# Patient Record
Sex: MALE | Race: WHITE | NOT HISPANIC OR LATINO | URBAN - METROPOLITAN AREA
[De-identification: names, ages, dates, MRNs, and addresses within clinical notes are randomized per-mention and may not be internally consistent; named-entity substitution may affect disease eponyms.]

---

## 2017-12-18 ENCOUNTER — EMERGENCY (EMERGENCY)
Facility: HOSPITAL | Age: 67
LOS: 0 days | Discharge: HOME | End: 2017-12-18

## 2017-12-18 DIAGNOSIS — E11.9 TYPE 2 DIABETES MELLITUS WITHOUT COMPLICATIONS: ICD-10-CM

## 2017-12-18 DIAGNOSIS — E78.00 PURE HYPERCHOLESTEROLEMIA, UNSPECIFIED: ICD-10-CM
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
## 2017-12-18 DIAGNOSIS — S09.90XA UNSPECIFIED INJURY OF HEAD, INITIAL ENCOUNTER: ICD-10-CM
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
## 2017-12-18 DIAGNOSIS — I10 ESSENTIAL (PRIMARY) HYPERTENSION: ICD-10-CM

## 2017-12-18 DIAGNOSIS — S00.01XA ABRASION OF SCALP, INITIAL ENCOUNTER: ICD-10-CM

## 2017-12-18 DIAGNOSIS — Z88.2 ALLERGY STATUS TO SULFONAMIDES: ICD-10-CM
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
## 2017-12-18 DIAGNOSIS — Z79.02 LONG TERM (CURRENT) USE OF ANTITHROMBOTICS/ANTIPLATELETS: ICD-10-CM

## 2018-09-12 ENCOUNTER — OUTPATIENT (OUTPATIENT)
Dept: OUTPATIENT SERVICES | Facility: HOSPITAL | Age: 68
LOS: 1 days | Discharge: HOME | End: 2018-09-12

## 2018-09-12 DIAGNOSIS — E11.65 TYPE 2 DIABETES MELLITUS WITH HYPERGLYCEMIA: ICD-10-CM

## 2018-09-12 DIAGNOSIS — E03.9 HYPOTHYROIDISM, UNSPECIFIED: ICD-10-CM

## 2018-09-12 DIAGNOSIS — E78.5 HYPERLIPIDEMIA, UNSPECIFIED: ICD-10-CM

## 2021-07-21 ENCOUNTER — LABORATORY RESULT (OUTPATIENT)
Age: 71
End: 2021-07-21

## 2021-07-22 ENCOUNTER — APPOINTMENT (OUTPATIENT)
Dept: UROLOGY | Facility: CLINIC | Age: 71
End: 2021-07-22
Payer: MEDICARE

## 2021-07-22 VITALS — HEIGHT: 72 IN | BODY MASS INDEX: 30.88 KG/M2 | WEIGHT: 228 LBS

## 2021-07-22 DIAGNOSIS — Z80.3 FAMILY HISTORY OF MALIGNANT NEOPLASM OF BREAST: ICD-10-CM

## 2021-07-22 DIAGNOSIS — N40.0 BENIGN PROSTATIC HYPERPLASIA WITHOUT LOWER URINARY TRACT SYMPMS: ICD-10-CM

## 2021-07-22 DIAGNOSIS — R35.0 FREQUENCY OF MICTURITION: ICD-10-CM

## 2021-07-22 DIAGNOSIS — N28.9 DISORDER OF KIDNEY AND URETER, UNSPECIFIED: ICD-10-CM

## 2021-07-22 DIAGNOSIS — Z80.0 FAMILY HISTORY OF MALIGNANT NEOPLASM OF DIGESTIVE ORGANS: ICD-10-CM

## 2021-07-22 DIAGNOSIS — Z83.3 FAMILY HISTORY OF DIABETES MELLITUS: ICD-10-CM

## 2021-07-22 DIAGNOSIS — Z87.891 PERSONAL HISTORY OF NICOTINE DEPENDENCE: ICD-10-CM

## 2021-07-22 PROBLEM — Z00.00 ENCOUNTER FOR PREVENTIVE HEALTH EXAMINATION: Status: ACTIVE | Noted: 2021-07-22

## 2021-07-22 PROCEDURE — 99204 OFFICE O/P NEW MOD 45 MIN: CPT

## 2021-07-22 NOTE — PHYSICAL EXAM
[General Appearance - In No Acute Distress] : no acute distress [] : no respiratory distress [Abdomen Soft] : soft [Abdomen Tenderness] : non-tender [Normal Station and Gait] : the gait and station were normal for the patient's age [Oriented To Time, Place, And Person] : oriented to person, place, and time

## 2021-07-26 ENCOUNTER — TRANSCRIPTION ENCOUNTER (OUTPATIENT)
Age: 71
End: 2021-07-26

## 2021-07-27 LAB
APPEARANCE: CLEAR
BACTERIA UR CULT: NORMAL
BILIRUBIN URINE: NEGATIVE
BLOOD URINE: NORMAL
COLOR: NORMAL
GLUCOSE QUALITATIVE U: NORMAL
KETONES URINE: NEGATIVE
LEUKOCYTE ESTERASE URINE: NEGATIVE
NITRITE URINE: NEGATIVE
PH URINE: 6
PROTEIN URINE: ABNORMAL
SPECIFIC GRAVITY URINE: 1.01
UROBILINOGEN URINE: NORMAL

## 2021-08-04 NOTE — ADDENDUM
[FreeTextEntry1] : Documented by MAE Wilson acting as a scribe for Dr. Didi Singer \par \par All medical record entries made by the Scribe were at my, Dr. Singer direction and\par personally dictated by me.  I have reviewed the chart and agree that the record\par accurately reflects my personal performance of the history, physical exam, procedure and imaging.  \par  \par \par

## 2021-08-04 NOTE — HISTORY OF PRESENT ILLNESS
[FreeTextEntry1] : DAWSON FALLON is a 71 year year old presenting for evaluation for worsening renal function and incomplete bladder emptying. \par Patient has a past medical history of diabetes, hypertension, and hyperlipidemia. \par \par Urination symptoms: Patient reports frequent urination. Patient states he urinates 4-5 x nocturia. Patient denies any incontinence. Patient denies dysuria and gross hematuria. Patient is currently taking Doxazosin as patient has a sulfa allergy and cannot take Tamsulosin. Patient also reports that he urinates after long periods of time. \par IPSS: 8-9 Moderate symptoms. \par Patient had a Kidney Bladder US which showed  with 30 gram prostate size. \par PVR in office bladder scan today is 185 mL. \par \par IIEF: 7 Severe ED\par \par Prostate Cancer Screening: Does not recall ever having PSA drawn \par \par Liquid Intake: 2 cups of coffee daily \par \par Old Records:\par 05/07/2021\par -Unremarkable examination of the kidneys with no significant interval change since 11/06/2020. No hydronephrosis, mass, or calculus. \par -.7 cc. Prevoid 121.8 cc. No filling defect or mural abnormality. \par -29.6 g prostate\par \par 03/09/2021\par Cr- 3.43 mg/dL\par GFR- 17 \par \par 2018\par Cr- 2.0\par GFR- 33\par \par Primary Care Doctor: Dr. Mauri Cervantes \par

## 2021-08-04 NOTE — ASSESSMENT
[FreeTextEntry1] : 71 year old male with incomplete bladder emptying and worsening renal function. \par Currently on Doxazosin. Sulfa allergy so unable to take Tamsulosin. Will switch to Alfuzosin. Side effects reviewed with patient including orthostatic hypotension and dizziness. Also, will repeat Bladder sonogram now that patient is on medications as patient was on no alpha blockers on prior ultrasound study. Patient instructed to discontinue Doxazosin if going to take Alfuzosin. \par \par As for worsening renal function. Discussed labs from 2021 and 2018. Recommend that patient have a consultation with a nephrologist. Referral order placed. \par \par No PSA in past. WIll order a PSA. \par \par Will also order Urinalysis and Urine Culture to assess for infection.\par \par Plan\par -PSA ordered\par -Urinalysis and Urine Culture ordered\par -Nephrology ordered\par -Alfuzosin sent to pharmacy.

## 2021-08-04 NOTE — LETTER BODY
[Dear  ___] : Dear  [unfilled], [Consult Letter:] : I had the pleasure of evaluating your patient, [unfilled]. [Please see my note below.] : Please see my note below. [Sincerely,] : Sincerely, [FreeTextEntry3] : Didi Singer MD, FACS\par

## 2021-08-12 ENCOUNTER — APPOINTMENT (OUTPATIENT)
Dept: UROLOGY | Facility: CLINIC | Age: 71
End: 2021-08-12
Payer: MEDICARE

## 2021-08-12 VITALS — BODY MASS INDEX: 30.2 KG/M2 | WEIGHT: 223 LBS | HEIGHT: 72 IN

## 2021-08-12 PROCEDURE — 99214 OFFICE O/P EST MOD 30 MIN: CPT

## 2021-08-14 NOTE — ASSESSMENT
[FreeTextEntry1] : 71 year old urinary symptoms and bladder emptying improved on Alfuzosin. \par PAtient does report side effects of dizziness but controls this by getting up from seated position slowly. \par PSA is 0.3 ng/mL which is normal.\par Urine shows no signs of infections. \par Following up with Nephrology in September.\par \par \par Plan\par -Follow up 6 months for bladder scan PVR\par -Follow up with Nephrology \par -Continue Alfuzosin\par -Follow up with PCP for Diabetes control.

## 2021-08-14 NOTE — HISTORY OF PRESENT ILLNESS
[FreeTextEntry1] : This is a 71 year old male who presented to office July 22, 2021 with incomplete bladder emptying and bothersome lower urinary tract symptoms. Patient was switched from Doxazosin to Alfuzosin. Patient reports he feels much improvement on the medication. Patient states stream feels stronger and he feels he empties his bladder better. \par Patient got a repeat bladder US which reveals PVR of 43 mL. This is compared to a PVR of 124 mL in previous Kidney bladder US while on Doxazosin. \par Patient does report dizziness with Alfuzosin but states that he gets up slowly from seated position which controls the feeling of dizziness. Patient also reports he has been controlling his diabetes and notices when blood sugar is low he gets dizzy easily. \par PSA done 07/27/2021 is 0.3 ng/mL. \par Urinalysis and Urine culture reveal no infection. 300 proteinuria. \par \par Patient has appointment with Nephrologist in September for renal function.

## 2021-12-09 ENCOUNTER — OUTPATIENT (OUTPATIENT)
Dept: OUTPATIENT SERVICES | Facility: HOSPITAL | Age: 71
LOS: 1 days | Discharge: HOME | End: 2021-12-09
Payer: MEDICARE

## 2021-12-09 DIAGNOSIS — F43.9 REACTION TO SEVERE STRESS, UNSPECIFIED: ICD-10-CM

## 2021-12-09 PROCEDURE — 78452 HT MUSCLE IMAGE SPECT MULT: CPT | Mod: 26

## 2022-02-14 ENCOUNTER — APPOINTMENT (OUTPATIENT)
Dept: UROLOGY | Facility: CLINIC | Age: 72
End: 2022-02-14
Payer: MEDICARE

## 2022-02-14 VITALS — WEIGHT: 211 LBS | TEMPERATURE: 96.4 F | BODY MASS INDEX: 28.58 KG/M2 | HEIGHT: 72 IN

## 2022-02-14 PROCEDURE — 99214 OFFICE O/P EST MOD 30 MIN: CPT

## 2022-02-14 PROCEDURE — 51798 US URINE CAPACITY MEASURE: CPT

## 2022-02-14 NOTE — REVIEW OF SYSTEMS
[Fever] : no fever [Chills] : no chills [Chest Pain] : no chest pain [Shortness Of Breath] : no shortness of breath [Abdominal Pain] : no abdominal pain [Vomiting] : no vomiting [see HPI] : see HPI

## 2022-02-14 NOTE — ASSESSMENT
[FreeTextEntry1] : 71 year old urinary symptoms and bladder emptying improved on Alfuzosin. \par \par PSA is 0.3 ng/mL which is normal.\par \par Following up with Nephrology\par \par \par Plan\par -Follow up 12 months for bladder scan PVR\par -continue Follow up with Nephrology \par -Continue Alfuzosin\par

## 2022-02-14 NOTE — HISTORY OF PRESENT ILLNESS
[FreeTextEntry1] : 71 year old male who  switched from Doxazosin to Alfuzosin. \par \par Patient reports he feels much improvement on the medication. Patient states stream feels stronger and he feels he empties his bladder better. \par \par \par Patient got a repeat bladder US which reveals PVR of 0 mL. \par \par \par PSA done 07/27/2021 is 0.3 ng/mL. \par \par followed by Dr. Betancourt for renal insufficiency [None] : no symptoms

## 2022-03-21 ENCOUNTER — TRANSCRIPTION ENCOUNTER (OUTPATIENT)
Age: 72
End: 2022-03-21

## 2022-03-21 ENCOUNTER — RESULT REVIEW (OUTPATIENT)
Age: 72
End: 2022-03-21

## 2022-03-21 ENCOUNTER — OUTPATIENT (OUTPATIENT)
Dept: OUTPATIENT SERVICES | Facility: HOSPITAL | Age: 72
LOS: 1 days | Discharge: HOME | End: 2022-03-21
Payer: MEDICARE

## 2022-03-21 VITALS
HEART RATE: 88 BPM | DIASTOLIC BLOOD PRESSURE: 92 MMHG | RESPIRATION RATE: 18 BRPM | SYSTOLIC BLOOD PRESSURE: 212 MMHG | WEIGHT: 218.92 LBS | TEMPERATURE: 97 F | HEIGHT: 71 IN

## 2022-03-21 VITALS — OXYGEN SATURATION: 96 % | HEART RATE: 75 BPM | RESPIRATION RATE: 18 BRPM

## 2022-03-21 DIAGNOSIS — I77.0 ARTERIOVENOUS FISTULA, ACQUIRED: Chronic | ICD-10-CM

## 2022-03-21 DIAGNOSIS — Z98.890 OTHER SPECIFIED POSTPROCEDURAL STATES: Chronic | ICD-10-CM

## 2022-03-21 PROCEDURE — 88312 SPECIAL STAINS GROUP 1: CPT | Mod: 26

## 2022-03-21 PROCEDURE — 88305 TISSUE EXAM BY PATHOLOGIST: CPT | Mod: 26

## 2022-03-21 NOTE — ASU DISCHARGE PLAN (ADULT/PEDIATRIC) - CARE PROVIDER_API CALL
Marcel Young)  Gastroenterology; Internal Medicine  [address] [place] [address]  Phone: [phone]  Fax: [phone]  Follow Up Time:

## 2022-03-21 NOTE — ASU PATIENT PROFILE, ADULT - FALL HARM RISK - UNIVERSAL INTERVENTIONS
Bed in lowest position, wheels locked, appropriate side rails in place/Call bell, personal items and telephone in reach/Instruct patient to call for assistance before getting out of bed or chair/Non-slip footwear when patient is out of bed/[place] to call system/Physically safe environment - no spills, clutter or unnecessary equipment/Purposeful Proactive Rounding/Room/bathroom lighting operational, light cord in reach

## 2022-03-21 NOTE — ASU PREOP CHECKLIST - DNR CLARIFICATION FORM COMPLETED
Anticoagulation Summary  As of 8/2/2019    INR goal:   2.0-3.0   TTR:   73.8 % (4 y)   INR used for dosing:   3.20! (8/2/2019)   Warfarin maintenance plan:   1 mg (1 mg x 1) every Mon, Fri; 2 mg (1 mg x 2) all other days   Weekly warfarin total:   12 mg   Plan last modified:   Markell Stanford, PharmD (7/29/2019)   Next INR check:   8/5/2019   Target end date:   Indefinite    Indications    CVA (cerebral vascular accident) (HCC) [I63.9]  S/P mitral valve repair [Z98.890]  Transient cerebral ischemia (Resolved) [G45.9]             Anticoagulation Episode Summary     INR check location:   Anticoagulation Clinic    Preferred lab:       Send INR reminders to:       Comments:   [place]      Anticoagulation Care Providers     Provider Role Specialty Phone number    Dudley Warner M.D. Referring Cardiology [phone]    Ramiro YepezD Responsible          Anticoagulation Patient Findings  Patient Findings     Negatives:   Signs/symptoms of thrombosis, Signs/symptoms of bleeding, Laboratory test error suspected, Change in health, Change in alcohol use, Change in activity, Upcoming invasive procedure, Emergency department visit, Upcoming dental procedure, Missed doses, Extra doses, Change in medications, Change in diet/appetite, Hospital admission, Bruising, Other complaints         HPI:   Roque Nguyen seen in clinic today, on anticoagulation therapy with warfarin for stroke prevention due to history of CVA/TIA and mitral valve repair.    Patient's previous INR was supratherapeutic at 5.9 on 7-29-19, at which time patient was instructed to hold two doses, then decrease weekly warfarin regimen.  He returns to clinic today to recheck INR to ensure it is therapeutic and thus preventing possible clotting and/or bleeding/bruising complications.    CHADS-VASc = n/a  (unadjusted ischemic stroke risk/year:  n/a)    Does patient have any changes to current medical/health status since last appt (Y/N):  NO  Does  "patient have any signs/symptoms of bleeding and/or thrombosis since the last appt (Y/N):  NO  Does patient have any interval changes to diet or medications since last appt (Y/N):  NO  Are there any complications or cost restrictions with current therapy (Y/N):  NO     Does [facility] have [facility]CP? YES, Dr Tai Fitch (If not, please document discussion that patient must be seen at [facility])       Vitals:  /64  HR 86    Weight  declines   Height   5' 7\"     Asssessment:      INR remains supratherapeutic at 3.2, therefore increasing patient's bleeding risk   Reason(s) for out of range INR today:  Continued loss of appetite, weight loss likely associated with identified malignancy.      Plan:  Instructed patient to HOLD X 1, then decrease dosing to 1mg daily until next INR in order to bring INR to therapeutic range.     Follow up:  Because warfarin is a high risk medication and current CHEST guidelines recommend regular monitoring intervals (few days up to 12 weeks), will have patient return to clinic in 3 days to recheck INR.    Markell Stanford, PharmD, BCACP            " [Normal] : oriented to person, place and time, the affect was normal, the mood was normal and not anxious n/a

## 2022-03-21 NOTE — ASU PATIENT PROFILE, ADULT - NSICDXPASTSURGICALHX_GEN_ALL_CORE_FT
PAST SURGICAL HISTORY:  AV fistula     H/O colonoscopy 5-10 years ago    History of right common carotid artery stent placement

## 2022-03-21 NOTE — PRE-ANESTHESIA EVALUATION ADULT - NSANTHOSAYNRD_GEN_A_CORE
denies/No. DWAINE screening performed.  STOP BANG Legend: 0-2 = LOW Risk; 3-4 = INTERMEDIATE Risk; 5-8 = HIGH Risk

## 2022-03-21 NOTE — CHART NOTE - NSCHARTNOTEFT_GEN_A_CORE
PACU ANESTHESIA ADMISSION NOTE      Procedure: EGD & Colonoscopy  Post op diagnosis:  Gastrits, Ulcer    ____  Intubated  TV:______       Rate: ______      FiO2: ______    __x__  Patent Airway    __x__  Full return of protective reflexes    ____  Full recovery from anesthesia / back to baseline     Vitals:   T:     97.7      R:   20               BP:  140/67               Sat:   96%                P: 77      Mental Status:  ____ Awake   __x___ Alert   _____ Drowsy   _____ Sedated    Nausea/Vomiting:  ____ NO  ______Yes,   See Post - Op Orders          Pain Scale (0-10):  _____    Treatment: ____ None    ___x_ See Post - Op/PCA Orders    Post - Operative Fluids:   ____ Oral   __x__ See Post - Op Orders    Plan: Discharge:   __x__Home       _____Floor     _____Critical Care    _____  Other:_________________    Comments: Pt tolerated procedure well, no anesthesia related complications. Care of pt endorsed to PACU, report given to PACU RN. Discharge when criteria are met.

## 2022-03-21 NOTE — ASU PATIENT PROFILE, ADULT - NSICDXPASTMEDICALHX_GEN_ALL_CORE_FT
PAST MEDICAL HISTORY:  Anemia     DM (diabetes mellitus)     HTN (hypertension)     Kidney failure     Myocardial infarction     Pneumonia, pneumococcal

## 2022-03-21 NOTE — ASU DISCHARGE PLAN (ADULT/PEDIATRIC) - NS MD DC FALL RISK RISK
For information on Fall & Injury Prevention, visit: [email or web address] OR  [email or web address] OR  https://www.cdc.gov/steadi/patient.html

## 2022-03-23 LAB — SURGICAL PATHOLOGY STUDY: SIGNIFICANT CHANGE UP

## 2022-03-24 DIAGNOSIS — D12.3 BENIGN NEOPLASM OF TRANSVERSE COLON: ICD-10-CM

## 2022-03-24 DIAGNOSIS — K44.9 DIAPHRAGMATIC HERNIA WITHOUT OBSTRUCTION OR GANGRENE: ICD-10-CM

## 2022-03-24 DIAGNOSIS — B96.81 HELICOBACTER PYLORI [H. PYLORI] AS THE CAUSE OF DISEASES CLASSIFIED ELSEWHERE: ICD-10-CM

## 2022-03-24 DIAGNOSIS — D64.9 ANEMIA, UNSPECIFIED: ICD-10-CM

## 2022-03-24 DIAGNOSIS — K57.30 DIVERTICULOSIS OF LARGE INTESTINE WITHOUT PERFORATION OR ABSCESS WITHOUT BLEEDING: ICD-10-CM

## 2022-03-24 DIAGNOSIS — K22.70 BARRETT'S ESOPHAGUS WITHOUT DYSPLASIA: ICD-10-CM

## 2022-03-24 DIAGNOSIS — K62.89 OTHER SPECIFIED DISEASES OF ANUS AND RECTUM: ICD-10-CM

## 2022-03-24 DIAGNOSIS — K29.50 UNSPECIFIED CHRONIC GASTRITIS WITHOUT BLEEDING: ICD-10-CM

## 2022-03-24 DIAGNOSIS — K29.80 DUODENITIS WITHOUT BLEEDING: ICD-10-CM

## 2022-03-24 DIAGNOSIS — K64.8 OTHER HEMORRHOIDS: ICD-10-CM

## 2022-03-24 LAB — SURGICAL PATHOLOGY STUDY: SIGNIFICANT CHANGE UP

## 2022-04-06 ENCOUNTER — INPATIENT (INPATIENT)
Facility: HOSPITAL | Age: 72
LOS: 7 days | Discharge: HOME | End: 2022-04-14
Attending: INTERNAL MEDICINE | Admitting: INTERNAL MEDICINE
Payer: MEDICARE

## 2022-04-06 VITALS
DIASTOLIC BLOOD PRESSURE: 62 MMHG | HEART RATE: 80 BPM | SYSTOLIC BLOOD PRESSURE: 143 MMHG | OXYGEN SATURATION: 96 % | RESPIRATION RATE: 20 BRPM | HEIGHT: 71 IN | TEMPERATURE: 98 F

## 2022-04-06 DIAGNOSIS — I77.0 ARTERIOVENOUS FISTULA, ACQUIRED: Chronic | ICD-10-CM

## 2022-04-06 DIAGNOSIS — Z98.890 OTHER SPECIFIED POSTPROCEDURAL STATES: Chronic | ICD-10-CM

## 2022-04-06 PROBLEM — E11.9 TYPE 2 DIABETES MELLITUS WITHOUT COMPLICATIONS: Chronic | Status: ACTIVE | Noted: 2022-03-21

## 2022-04-06 PROBLEM — I10 ESSENTIAL (PRIMARY) HYPERTENSION: Chronic | Status: ACTIVE | Noted: 2022-03-21

## 2022-04-06 PROBLEM — N19 UNSPECIFIED KIDNEY FAILURE: Chronic | Status: ACTIVE | Noted: 2022-03-21

## 2022-04-06 PROBLEM — D64.9 ANEMIA, UNSPECIFIED: Chronic | Status: ACTIVE | Noted: 2022-03-21

## 2022-04-06 PROBLEM — I21.9 ACUTE MYOCARDIAL INFARCTION, UNSPECIFIED: Chronic | Status: ACTIVE | Noted: 2022-03-21

## 2022-04-06 PROBLEM — J13 PNEUMONIA DUE TO STREPTOCOCCUS PNEUMONIAE: Chronic | Status: ACTIVE | Noted: 2022-03-21

## 2022-04-06 LAB
ALBUMIN SERPL ELPH-MCNC: 4.2 G/DL — SIGNIFICANT CHANGE UP (ref 3.5–5.2)
ALP SERPL-CCNC: 50 U/L — SIGNIFICANT CHANGE UP (ref 30–115)
ALT FLD-CCNC: 20 U/L — SIGNIFICANT CHANGE UP (ref 0–41)
ANION GAP SERPL CALC-SCNC: 14 MMOL/L — SIGNIFICANT CHANGE UP (ref 7–14)
AST SERPL-CCNC: 25 U/L — SIGNIFICANT CHANGE UP (ref 0–41)
BASOPHILS # BLD AUTO: 0.05 K/UL — SIGNIFICANT CHANGE UP (ref 0–0.2)
BASOPHILS NFR BLD AUTO: 0.9 % — SIGNIFICANT CHANGE UP (ref 0–1)
BILIRUB SERPL-MCNC: 0.3 MG/DL — SIGNIFICANT CHANGE UP (ref 0.2–1.2)
BUN SERPL-MCNC: 87 MG/DL — CRITICAL HIGH (ref 10–20)
CALCIUM SERPL-MCNC: 9.5 MG/DL — SIGNIFICANT CHANGE UP (ref 8.5–10.1)
CHLORIDE SERPL-SCNC: 103 MMOL/L — SIGNIFICANT CHANGE UP (ref 98–110)
CO2 SERPL-SCNC: 22 MMOL/L — SIGNIFICANT CHANGE UP (ref 17–32)
CREAT SERPL-MCNC: 4.6 MG/DL — CRITICAL HIGH (ref 0.7–1.5)
EGFR: 13 ML/MIN/1.73M2 — LOW
EOSINOPHIL # BLD AUTO: 0.22 K/UL — SIGNIFICANT CHANGE UP (ref 0–0.7)
EOSINOPHIL NFR BLD AUTO: 3.9 % — SIGNIFICANT CHANGE UP (ref 0–8)
GLUCOSE SERPL-MCNC: 125 MG/DL — HIGH (ref 70–99)
HCT VFR BLD CALC: 33.7 % — LOW (ref 42–52)
HGB BLD-MCNC: 10.6 G/DL — LOW (ref 14–18)
IMM GRANULOCYTES NFR BLD AUTO: 0.2 % — SIGNIFICANT CHANGE UP (ref 0.1–0.3)
LYMPHOCYTES # BLD AUTO: 0.67 K/UL — LOW (ref 1.2–3.4)
LYMPHOCYTES # BLD AUTO: 12 % — LOW (ref 20.5–51.1)
MAGNESIUM SERPL-MCNC: 2.6 MG/DL — HIGH (ref 1.8–2.4)
MCHC RBC-ENTMCNC: 26.5 PG — LOW (ref 27–31)
MCHC RBC-ENTMCNC: 31.5 G/DL — LOW (ref 32–37)
MCV RBC AUTO: 84.3 FL — SIGNIFICANT CHANGE UP (ref 80–94)
MONOCYTES # BLD AUTO: 0.51 K/UL — SIGNIFICANT CHANGE UP (ref 0.1–0.6)
MONOCYTES NFR BLD AUTO: 9.1 % — SIGNIFICANT CHANGE UP (ref 1.7–9.3)
NEUTROPHILS # BLD AUTO: 4.14 K/UL — SIGNIFICANT CHANGE UP (ref 1.4–6.5)
NEUTROPHILS NFR BLD AUTO: 73.9 % — SIGNIFICANT CHANGE UP (ref 42.2–75.2)
NRBC # BLD: 0 /100 WBCS — SIGNIFICANT CHANGE UP (ref 0–0)
NT-PROBNP SERPL-SCNC: HIGH PG/ML (ref 0–300)
PLATELET # BLD AUTO: 139 K/UL — SIGNIFICANT CHANGE UP (ref 130–400)
POTASSIUM SERPL-MCNC: 4.8 MMOL/L — SIGNIFICANT CHANGE UP (ref 3.5–5)
POTASSIUM SERPL-SCNC: 4.8 MMOL/L — SIGNIFICANT CHANGE UP (ref 3.5–5)
PROT SERPL-MCNC: 6.5 G/DL — SIGNIFICANT CHANGE UP (ref 6–8)
RBC # BLD: 4 M/UL — LOW (ref 4.7–6.1)
RBC # FLD: 15.5 % — HIGH (ref 11.5–14.5)
SARS-COV-2 RNA SPEC QL NAA+PROBE: SIGNIFICANT CHANGE UP
SODIUM SERPL-SCNC: 139 MMOL/L — SIGNIFICANT CHANGE UP (ref 135–146)
TROPONIN T SERPL-MCNC: 0.05 NG/ML — CRITICAL HIGH
WBC # BLD: 5.6 K/UL — SIGNIFICANT CHANGE UP (ref 4.8–10.8)
WBC # FLD AUTO: 5.6 K/UL — SIGNIFICANT CHANGE UP (ref 4.8–10.8)

## 2022-04-06 PROCEDURE — 71045 X-RAY EXAM CHEST 1 VIEW: CPT | Mod: 26

## 2022-04-06 PROCEDURE — 93010 ELECTROCARDIOGRAM REPORT: CPT

## 2022-04-06 PROCEDURE — 99285 EMERGENCY DEPT VISIT HI MDM: CPT

## 2022-04-06 RX ORDER — HEPARIN SODIUM 5000 [USP'U]/ML
5000 INJECTION INTRAVENOUS; SUBCUTANEOUS EVERY 8 HOURS
Refills: 0 | Status: DISCONTINUED | OUTPATIENT
Start: 2022-04-06 | End: 2022-04-11

## 2022-04-06 RX ORDER — CLOPIDOGREL BISULFATE 75 MG/1
75 TABLET, FILM COATED ORAL DAILY
Refills: 0 | Status: DISCONTINUED | OUTPATIENT
Start: 2022-04-06 | End: 2022-04-12

## 2022-04-06 RX ORDER — ASPIRIN/CALCIUM CARB/MAGNESIUM 324 MG
81 TABLET ORAL DAILY
Refills: 0 | Status: DISCONTINUED | OUTPATIENT
Start: 2022-04-06 | End: 2022-04-06

## 2022-04-06 RX ORDER — ATORVASTATIN CALCIUM 80 MG/1
80 TABLET, FILM COATED ORAL AT BEDTIME
Refills: 0 | Status: DISCONTINUED | OUTPATIENT
Start: 2022-04-06 | End: 2022-04-14

## 2022-04-06 RX ORDER — FUROSEMIDE 40 MG
40 TABLET ORAL
Refills: 0 | Status: DISCONTINUED | OUTPATIENT
Start: 2022-04-06 | End: 2022-04-07

## 2022-04-06 RX ORDER — ASPIRIN/CALCIUM CARB/MAGNESIUM 324 MG
81 TABLET ORAL DAILY
Refills: 0 | Status: DISCONTINUED | OUTPATIENT
Start: 2022-04-06 | End: 2022-04-14

## 2022-04-06 RX ORDER — MONTELUKAST 4 MG/1
1 TABLET, CHEWABLE ORAL
Qty: 0 | Refills: 0 | DISCHARGE

## 2022-04-06 RX ORDER — METOPROLOL TARTRATE 50 MG
1 TABLET ORAL
Qty: 0 | Refills: 0 | DISCHARGE

## 2022-04-06 RX ORDER — PANTOPRAZOLE SODIUM 20 MG/1
40 TABLET, DELAYED RELEASE ORAL
Refills: 0 | Status: DISCONTINUED | OUTPATIENT
Start: 2022-04-06 | End: 2022-04-14

## 2022-04-06 RX ORDER — FENOFIBRATE,MICRONIZED 130 MG
145 CAPSULE ORAL AT BEDTIME
Refills: 0 | Status: DISCONTINUED | OUTPATIENT
Start: 2022-04-06 | End: 2022-04-07

## 2022-04-06 RX ORDER — METOPROLOL TARTRATE 50 MG
25 TABLET ORAL
Refills: 0 | Status: DISCONTINUED | OUTPATIENT
Start: 2022-04-06 | End: 2022-04-14

## 2022-04-06 RX ORDER — ACETAMINOPHEN 500 MG
650 TABLET ORAL EVERY 6 HOURS
Refills: 0 | Status: DISCONTINUED | OUTPATIENT
Start: 2022-04-06 | End: 2022-04-14

## 2022-04-06 RX ORDER — FUROSEMIDE 40 MG
40 TABLET ORAL ONCE
Refills: 0 | Status: COMPLETED | OUTPATIENT
Start: 2022-04-06 | End: 2022-04-06

## 2022-04-06 RX ORDER — NITROGLYCERIN 6.5 MG
0.4 CAPSULE, EXTENDED RELEASE ORAL
Refills: 0 | Status: DISCONTINUED | OUTPATIENT
Start: 2022-04-06 | End: 2022-04-14

## 2022-04-06 RX ORDER — LORATADINE 10 MG/1
10 TABLET ORAL DAILY
Refills: 0 | Status: DISCONTINUED | OUTPATIENT
Start: 2022-04-06 | End: 2022-04-14

## 2022-04-06 RX ORDER — FENOFIBRATE,MICRONIZED 130 MG
145 CAPSULE ORAL DAILY
Refills: 0 | Status: DISCONTINUED | OUTPATIENT
Start: 2022-04-06 | End: 2022-04-06

## 2022-04-06 RX ORDER — NIFEDIPINE 30 MG
30 TABLET, EXTENDED RELEASE 24 HR ORAL AT BEDTIME
Refills: 0 | Status: DISCONTINUED | OUTPATIENT
Start: 2022-04-06 | End: 2022-04-07

## 2022-04-06 RX ORDER — NIFEDIPINE 30 MG
30 TABLET, EXTENDED RELEASE 24 HR ORAL DAILY
Refills: 0 | Status: DISCONTINUED | OUTPATIENT
Start: 2022-04-06 | End: 2022-04-06

## 2022-04-06 RX ORDER — LANOLIN ALCOHOL/MO/W.PET/CERES
3 CREAM (GRAM) TOPICAL AT BEDTIME
Refills: 0 | Status: DISCONTINUED | OUTPATIENT
Start: 2022-04-06 | End: 2022-04-14

## 2022-04-06 RX ADMIN — Medication 3 MILLIGRAM(S): at 23:10

## 2022-04-06 RX ADMIN — Medication 25 MILLIGRAM(S): at 23:10

## 2022-04-06 RX ADMIN — Medication 30 MILLIGRAM(S): at 23:10

## 2022-04-06 RX ADMIN — ATORVASTATIN CALCIUM 80 MILLIGRAM(S): 80 TABLET, FILM COATED ORAL at 23:13

## 2022-04-06 RX ADMIN — Medication 40 MILLIGRAM(S): at 18:24

## 2022-04-06 RX ADMIN — HEPARIN SODIUM 5000 UNIT(S): 5000 INJECTION INTRAVENOUS; SUBCUTANEOUS at 23:10

## 2022-04-06 NOTE — ED PROVIDER NOTE - CLINICAL SUMMARY MEDICAL DECISION MAKING FREE TEXT BOX
70 yo man with progressively worsening SOB over the past 3 weeks.  Well known to Dr. Douglas.  Likely CHF exacerbation and likely component of ESRD requiring HD.  Patient has not started yet, but has an AV fistual in place in preparation for HD.  IV furosemide and admission.

## 2022-04-06 NOTE — H&P ADULT - HISTORY OF PRESENT ILLNESS
Pt is a 71 male with PMH HTN, HLD, MI, anemia , CKD5 presents to ED complaining of shortness of breath which started 3 weeks ago and has been getting progressively worse. Patient states that little activity gets him sob. He also noted having trouble lying down flat and has had increased LE swelling. Patient had recent echo 1 month ago at [facility] which showed significantly reduced LV function (EF 25-30%, G2DD, mild MR). Patient denies any fever, chest pain, palpitations, abdominal pain, nausea, vomiting, diarrhea, dysuria. Patient still has good urine output and has left upper extremity fistula not yet used.     In ED vitals: /62, HR 80, RR 20, T 97.7, Spo2 96% on RA.  Labs significant for BNP 23K, Trop 0.05, CXR looks congested - official read pending.

## 2022-04-06 NOTE — H&P ADULT - ATTENDING COMMENTS
Patient seen and examined independently. Resident's H & P reviewed. Agree with the findings and plan of care except,    A 71 years old male presented to the ED with progressively worsening sob for the last 3 weeks. Also c/o LE swelling and orthopnea.     Hb: 10.4   BUN/Cr: 82/5.2  Pro BNP: 24222  CXR: B/L pleural effusion. Increase intersitial markings. (Check official report)  Trop: 0.05-->0.04    ASSESSMENT:     1. Acute HFmrEF  2. DM-2 / HTN / DL  3. CKD-5  4. Anemia of chronic disease    PLAN: Patient seen and examined independently. Resident's H & P reviewed. Agree with the findings and plan of care except,    A 71 years old male presented to the ED with progressively worsening sob for the last one week. Also c/o LE swelling and orthopnea.     Hb: 10.4   BUN/Cr: 82/5.2  Pro BNP: 98119  CXR: B/L pleural effusion. Increase intersitial markings. (Check official report)  Trop: 0.05-->0.04    ASSESSMENT:     1. Acute HFmrEF  2. DM-2 / HTN / DL  3. CKD-5  4. Anemia of chronic disease  5. CAD / Abnormal stress test    PLAN:    . Cont tele  . Increase Lasix to 60 mg iv q 12h  . Check i's and o's and daily wt.   . Low salt diet and water restriction to 1.5 L/D  . ECHO  . Cardiology and nephrology eval  . On ASA, Plavix and Metoprolol  . Will need catheter for HD. Will d/w the cardiology about holding Plavix.   . Iron studies  . Monitor FS  . Cont Metoprolol and his other home meds

## 2022-04-06 NOTE — ED PROVIDER NOTE - OBJECTIVE STATEMENT
Pt is a 71 male with PMH HTN, HLD, MI, anemia , CKD, presents to ED with complaints of shortness of breath. As per pt, over past 3 weeks pt has been having progressively worsening SOB. {t states going from bed to chair at home grows significantly sob. Had recent echo 1 month prior at [facility] where pt cardiologist is located, significantly reduced LV function, called cardiologist advised him to come into ED. Pt denies any fever, chills, bodyaches, chest pain, abdominal pain, NVCD

## 2022-04-06 NOTE — ED PROVIDER NOTE - PHYSICAL EXAMINATION
Physical Exam    Vital Signs: I have reviewed the initial vital signs.  Constitutional: well-nourished, appears stated age, no acute distress  Eyes: Conjunctiva pink, Sclera clear, PERRLA, EOMI.  Cardiovascular: S1 and S2, regular rate, regular rhythm, well-perfused extremities, radial pulses equal and 2+  Respiratory: unlabored respiratory effort, clear to auscultation bilaterally no wheezing, rales and rhonchi  Gastrointestinal: soft, non-tender abdomen, no pulsatile mass, normal bowl sounds  Musculoskeletal: supple neck, bilat pitting lower extremity edema, no midline tenderness  Integumentary: warm, dry, no rash  Neurologic: awake, alert, cranial nerves II-XII grossly intact, extremities’ motor and sensory functions grossly intact  Psychiatric: appropriate mood, appropriate affect

## 2022-04-06 NOTE — H&P ADULT - ASSESSMENT
Pt is a 71 male with PMH HTN, HLD, MI, anemia , CKD5 presents to ED complaining of shortness of breath which started 3 weeks ago and has been getting progressively worse.    #SOB 2/2 Acute HFrEF exacerbation   - BNP 23K, CXR looks congested -> f/u official read   - echo 3/29/22 at [facility] showed EF 25-30%, G2DD, mild MR   - s/p 40mg IV lasix in Ed -> cont 40mg IV bid   - monitor Is and Os, daily weights   - cardio consult - Dr. Valdez   - cont metoprolol tartrate 25 bid, not on acei/arb likely due to worsening renal function     #CKD5 not yet on HD   #Elevated troponin likely 2/2 ckd   - left arm precautions for AVF   - trop 0.05 -> f/u repeat   - avoid nephrotoxic agents     #HTN - cont amlodipine  #HLD - cont statin, fenofibrate, f.u lipid profile   #CAD - cont statin, asa, plavix metoprolol   #CORAZON - cont multivitamin     #Misc   Diet DASH  Activity IAT   GI ppx PPI   DVT ppx heparin   Full code

## 2022-04-06 NOTE — PATIENT PROFILE ADULT - FALL HARM RISK - HARM RISK INTERVENTIONS

## 2022-04-06 NOTE — ED PROVIDER NOTE - PRIOR EKG STATUS
Detail Level: Zone the EKG is unchanged from prior EKG General Sunscreen Counseling: I recommended a broad spectrum sunscreen with a SPF of 30 or higher.  I explained that SPF 30 sunscreens block approximately 97 percent of the sun's harmful rays.  Sunscreens should be applied at least 15 minutes prior to expected sun exposure and then every 2 hours after that as long as sun exposure continues. If swimming or exercising sunscreen should be reapplied every 45 minutes to an hour after getting wet or sweating.  One ounce, or the equivalent of a shot glass full of sunscreen, is adequate to protect the skin not covered by a bathing suit. I also recommended a lip balm with a sunscreen as well. Sun protective clothing can be used in lieu of sunscreen but must be worn the entire time you are exposed to the sun's rays.

## 2022-04-06 NOTE — ED PROVIDER NOTE - NS ED ATTENDING STATEMENT MOD
This was a shared visit with the JESSY. I reviewed and verified the documentation and independently performed the documented:

## 2022-04-06 NOTE — PATIENT PROFILE ADULT - HOME ACCESSIBILITY CONCERNS
[FreeTextEntry1] : The patient is a 73-year-old right-handed woman her with a long-standing history of manic depressive disorder who had bilateral hand tremor for the past few years, with relatively acute worsening in October of 2017. At the time she also developed hypercalcemia and difficulty eating, for which she was hospitalized. No etiology was identified, and this has recovered. The tremor has not clearly progressed but varies with anxiety. It affects both hands with action. There is no leg or voice tremor. She has no changes in voice or facial expression, and no dysphagia no drooling. She is no trouble turning over in bed. Her handwriting is shaky her. The tremor slows her activities of daily livings but she does not need any help. Her walking feels "off", but she has not fallen. She is no freezing of gait. She has no history of acting out of dream this, and no hallucinations. She does intermittently still get manic or depressed. She has some complaints of short-term memory loss. There is no family history of tremor or Parkinson's disease. However, a paternal uncle did have Olayinka's disease at age 82. Her father passed away at age 56 from cardiac disease and had no neurologic symptoms. She has 2 siblings who have no neurologic problems either.\par \par 1. 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 Tremor was improved, but worse on right again now (intermittent). Off lithium. On abilify 7ng mg now, and continues with bupropion 300 mg, also on lexapro 20mg\par 3. Feels uncomfortable when driving/being driven - spatial perception none

## 2022-04-06 NOTE — ED ADULT NURSE NOTE - OBJECTIVE STATEMENT
pt presents with sob on exertion with weakness and difficulty sleeping. pt states shortness of breath worsens when laying down. left arm precautions due to AV fistula that is maturing for future Dialysis.

## 2022-04-06 NOTE — H&P ADULT - NSHPLABSRESULTS_GEN_ALL_CORE
10.6   5.60  )-----------( 139      ( 06 Apr 2022 15:50 )             33.7       04-06    139  |  103  |  87<HH>  ----------------------------<  125<H>  4.8   |  22  |  4.6<HH>    Ca    9.5      06 Apr 2022 15:50  Mg     2.6     04-06    TPro  6.5  /  Alb  4.2  /  TBili  0.3  /  DBili  x   /  AST  25  /  ALT  20  /  AlkPhos  50  04-06          Lactate Trend      CARDIAC MARKERS ( 06 Apr 2022 15:50 )  x     / 0.05 ng/mL / x     / x     / x        CAPILLARY BLOOD GLUCOSE

## 2022-04-06 NOTE — ED ADULT NURSE NOTE - NSIMPLEMENTINTERV_GEN_ALL_ED
Implemented All Fall with Harm Risk Interventions:  [place] to call system. Call bell, personal items and telephone within reach. Instruct patient to call for assistance. Room bathroom lighting operational. Non-slip footwear when patient is off stretcher. Physically safe environment: no spills, clutter or unnecessary equipment. Stretcher in lowest position, wheels locked, appropriate side rails in place. Provide visual cue, wrist band, yellow gown, etc. Monitor gait and stability. Monitor for mental status changes and reorient to person, place, and time. Review medications for side effects contributing to fall risk. Reinforce activity limits and safety measures with patient and family. Provide visual clues: red socks.

## 2022-04-07 LAB
A1C WITH ESTIMATED AVERAGE GLUCOSE RESULT: 6.4 % — HIGH (ref 4–5.6)
ALBUMIN SERPL ELPH-MCNC: 4 G/DL — SIGNIFICANT CHANGE UP (ref 3.5–5.2)
ALP SERPL-CCNC: 44 U/L — SIGNIFICANT CHANGE UP (ref 30–115)
ALT FLD-CCNC: 18 U/L — SIGNIFICANT CHANGE UP (ref 0–41)
ANION GAP SERPL CALC-SCNC: 12 MMOL/L — SIGNIFICANT CHANGE UP (ref 7–14)
ANION GAP SERPL CALC-SCNC: 15 MMOL/L — HIGH (ref 7–14)
AST SERPL-CCNC: 22 U/L — SIGNIFICANT CHANGE UP (ref 0–41)
BASOPHILS # BLD AUTO: 0.04 K/UL — SIGNIFICANT CHANGE UP (ref 0–0.2)
BASOPHILS NFR BLD AUTO: 0.7 % — SIGNIFICANT CHANGE UP (ref 0–1)
BILIRUB SERPL-MCNC: 0.3 MG/DL — SIGNIFICANT CHANGE UP (ref 0.2–1.2)
BUN SERPL-MCNC: 67 MG/DL — CRITICAL HIGH (ref 10–20)
BUN SERPL-MCNC: 82 MG/DL — CRITICAL HIGH (ref 10–20)
CALCIUM SERPL-MCNC: 9.1 MG/DL — SIGNIFICANT CHANGE UP (ref 8.5–10.1)
CALCIUM SERPL-MCNC: 9.2 MG/DL — SIGNIFICANT CHANGE UP (ref 8.5–10.1)
CHLORIDE SERPL-SCNC: 100 MMOL/L — SIGNIFICANT CHANGE UP (ref 98–110)
CHLORIDE SERPL-SCNC: 102 MMOL/L — SIGNIFICANT CHANGE UP (ref 98–110)
CHOLEST SERPL-MCNC: 101 MG/DL — SIGNIFICANT CHANGE UP
CO2 SERPL-SCNC: 20 MMOL/L — SIGNIFICANT CHANGE UP (ref 17–32)
CO2 SERPL-SCNC: 24 MMOL/L — SIGNIFICANT CHANGE UP (ref 17–32)
CREAT SERPL-MCNC: 4.1 MG/DL — CRITICAL HIGH (ref 0.7–1.5)
CREAT SERPL-MCNC: 5.2 MG/DL — CRITICAL HIGH (ref 0.7–1.5)
EGFR: 11 ML/MIN/1.73M2 — LOW
EGFR: 15 ML/MIN/1.73M2 — LOW
EOSINOPHIL # BLD AUTO: 0.26 K/UL — SIGNIFICANT CHANGE UP (ref 0–0.7)
EOSINOPHIL NFR BLD AUTO: 4.4 % — SIGNIFICANT CHANGE UP (ref 0–8)
ESTIMATED AVERAGE GLUCOSE: 137 MG/DL — HIGH (ref 68–114)
GLUCOSE SERPL-MCNC: 118 MG/DL — HIGH (ref 70–99)
GLUCOSE SERPL-MCNC: 140 MG/DL — HIGH (ref 70–99)
HCT VFR BLD CALC: 32.3 % — LOW (ref 42–52)
HCV AB S/CO SERPL IA: 0.04 COI — SIGNIFICANT CHANGE UP
HCV AB S/CO SERPL IA: 0.04 COI — SIGNIFICANT CHANGE UP
HCV AB SERPL-IMP: SIGNIFICANT CHANGE UP
HCV AB SERPL-IMP: SIGNIFICANT CHANGE UP
HDLC SERPL-MCNC: 41 MG/DL — SIGNIFICANT CHANGE UP
HGB BLD-MCNC: 10.4 G/DL — LOW (ref 14–18)
IMM GRANULOCYTES NFR BLD AUTO: 0.3 % — SIGNIFICANT CHANGE UP (ref 0.1–0.3)
LIPID PNL WITH DIRECT LDL SERPL: 33 MG/DL — SIGNIFICANT CHANGE UP
LYMPHOCYTES # BLD AUTO: 0.71 K/UL — LOW (ref 1.2–3.4)
LYMPHOCYTES # BLD AUTO: 12 % — LOW (ref 20.5–51.1)
MAGNESIUM SERPL-MCNC: 2.2 MG/DL — SIGNIFICANT CHANGE UP (ref 1.8–2.4)
MAGNESIUM SERPL-MCNC: 2.3 MG/DL — SIGNIFICANT CHANGE UP (ref 1.8–2.4)
MCHC RBC-ENTMCNC: 27.3 PG — SIGNIFICANT CHANGE UP (ref 27–31)
MCHC RBC-ENTMCNC: 32.2 G/DL — SIGNIFICANT CHANGE UP (ref 32–37)
MCV RBC AUTO: 84.8 FL — SIGNIFICANT CHANGE UP (ref 80–94)
MONOCYTES # BLD AUTO: 0.6 K/UL — SIGNIFICANT CHANGE UP (ref 0.1–0.6)
MONOCYTES NFR BLD AUTO: 10.2 % — HIGH (ref 1.7–9.3)
NEUTROPHILS # BLD AUTO: 4.28 K/UL — SIGNIFICANT CHANGE UP (ref 1.4–6.5)
NEUTROPHILS NFR BLD AUTO: 72.4 % — SIGNIFICANT CHANGE UP (ref 42.2–75.2)
NON HDL CHOLESTEROL: 60 MG/DL — SIGNIFICANT CHANGE UP
NRBC # BLD: 0 /100 WBCS — SIGNIFICANT CHANGE UP (ref 0–0)
PLATELET # BLD AUTO: 142 K/UL — SIGNIFICANT CHANGE UP (ref 130–400)
POTASSIUM SERPL-MCNC: 4.6 MMOL/L — SIGNIFICANT CHANGE UP (ref 3.5–5)
POTASSIUM SERPL-MCNC: 5 MMOL/L — SIGNIFICANT CHANGE UP (ref 3.5–5)
POTASSIUM SERPL-SCNC: 4.6 MMOL/L — SIGNIFICANT CHANGE UP (ref 3.5–5)
POTASSIUM SERPL-SCNC: 5 MMOL/L — SIGNIFICANT CHANGE UP (ref 3.5–5)
PROT SERPL-MCNC: 6 G/DL — SIGNIFICANT CHANGE UP (ref 6–8)
RBC # BLD: 3.81 M/UL — LOW (ref 4.7–6.1)
RBC # FLD: 15.8 % — HIGH (ref 11.5–14.5)
SODIUM SERPL-SCNC: 135 MMOL/L — SIGNIFICANT CHANGE UP (ref 135–146)
SODIUM SERPL-SCNC: 138 MMOL/L — SIGNIFICANT CHANGE UP (ref 135–146)
TRIGL SERPL-MCNC: 96 MG/DL — SIGNIFICANT CHANGE UP
TROPONIN T SERPL-MCNC: 0.04 NG/ML — CRITICAL HIGH
WBC # BLD: 5.91 K/UL — SIGNIFICANT CHANGE UP (ref 4.8–10.8)
WBC # FLD AUTO: 5.91 K/UL — SIGNIFICANT CHANGE UP (ref 4.8–10.8)

## 2022-04-07 PROCEDURE — 99223 1ST HOSP IP/OBS HIGH 75: CPT

## 2022-04-07 PROCEDURE — 71045 X-RAY EXAM CHEST 1 VIEW: CPT | Mod: 26

## 2022-04-07 RX ORDER — FUROSEMIDE 40 MG
60 TABLET ORAL
Refills: 0 | Status: DISCONTINUED | OUTPATIENT
Start: 2022-04-07 | End: 2022-04-08

## 2022-04-07 RX ADMIN — LORATADINE 10 MILLIGRAM(S): 10 TABLET ORAL at 17:19

## 2022-04-07 RX ADMIN — HEPARIN SODIUM 5000 UNIT(S): 5000 INJECTION INTRAVENOUS; SUBCUTANEOUS at 05:21

## 2022-04-07 RX ADMIN — Medication 81 MILLIGRAM(S): at 11:44

## 2022-04-07 RX ADMIN — Medication 40 MILLIGRAM(S): at 05:07

## 2022-04-07 RX ADMIN — CLOPIDOGREL BISULFATE 75 MILLIGRAM(S): 75 TABLET, FILM COATED ORAL at 11:44

## 2022-04-07 RX ADMIN — PANTOPRAZOLE SODIUM 40 MILLIGRAM(S): 20 TABLET, DELAYED RELEASE ORAL at 08:58

## 2022-04-07 RX ADMIN — Medication 25 MILLIGRAM(S): at 17:23

## 2022-04-07 RX ADMIN — Medication 25 MILLIGRAM(S): at 05:20

## 2022-04-07 RX ADMIN — Medication 1 TABLET(S): at 11:44

## 2022-04-07 RX ADMIN — ATORVASTATIN CALCIUM 80 MILLIGRAM(S): 80 TABLET, FILM COATED ORAL at 21:46

## 2022-04-07 RX ADMIN — HEPARIN SODIUM 5000 UNIT(S): 5000 INJECTION INTRAVENOUS; SUBCUTANEOUS at 21:46

## 2022-04-07 RX ADMIN — Medication 60 MILLIGRAM(S): at 17:23

## 2022-04-07 NOTE — CONSULT NOTE ADULT - SUBJECTIVE AND OBJECTIVE BOX
NEPHROLOGY CONSULTATION NOTE    Pt is a 71 male with PMH HTN, HLD, MI, anemia , CKD5 presents to ED complaining of shortness of breath which started 3 weeks ago and has been getting progressively worse. Patient states that little activity gets him sob. He also noted having trouble lying down flat and has had increased LE swelling. Patient had recent echo 1 month ago at [place] which showed significantly reduced LV function (EF 25-30%, G2DD, mild MR). Patient denies any fever, chest pain, palpitations, abdominal pain, nausea, vomiting, diarrhea, dysuria. Patient still has good urine output and has left upper extremity fistula not yet used.     In ED vitals: /62, HR 80, RR 20, T 97.7, Spo2 96% on RA.  Labs significant for BNP 23K, Trop 0.05, CXR looks congested - official read pending.     Pt was seen in the office recently, was waiting for the AVF to mature (created in Jan-Feb 2022). He was also seen for Kidney Tx at [place], but his EF is poor. He needs an cath and stent placement for optimization.  SOB significantly improved after IV LAsix.      PAST MEDICAL & SURGICAL HISTORY:  HTN (hypertension)    Kidney failure    Pneumonia, pneumococcal    Anemia    DM (diabetes mellitus)    Myocardial infarction    AV fistula    History of right common carotid artery stent placement    H/O colonoscopy  5-10 years ago      Allergies:  sulfa drugs (Anaphylaxis; Swelling)    Home Medications Reviewed  Hospital Medications:   MEDICATIONS  (STANDING):  aspirin  chewable 81 milliGRAM(s) Oral daily  atorvastatin 80 milliGRAM(s) Oral at bedtime  clopidogrel Tablet 75 milliGRAM(s) Oral daily  furosemide   Injectable 60 milliGRAM(s) IV Push two times a day  heparin   Injectable 5000 Unit(s) SubCutaneous every 8 hours  loratadine 10 milliGRAM(s) Oral daily  metoprolol tartrate 25 milliGRAM(s) Oral two times a day  multivitamin 1 Tablet(s) Oral daily  pantoprazole    Tablet 40 milliGRAM(s) Oral before breakfast      SOCIAL HISTORY:  Denies ETOH,Smoking,   FAMILY HISTORY:        REVIEW OF SYSTEMS:  CONSTITUTIONAL: No weakness, fevers or chills  RESPIRATORY: No cough, wheezing, Has  shortness of breath  CARDIOVASCULAR: No chest pain or palpitations.  GASTROINTESTINAL: No abdominal or epigastric pain. No nausea, vomiting  GENITOURINARY: No dysuria,or hematuria  NEUROLOGICAL: No numbness or weakness  SKIN: No itching, burning, rashes, or lesions   VASCULAR: No bilateral lower extremity edema.   All other review of systems is negative unless indicated above.    VITALS:  T(F): 97 (04-07-22 @ 04:52), Max: 97.7 (04-06-22 @ 15:14)  HR: 77 (04-07-22 @ 04:52)  BP: 156/75 (04-07-22 @ 04:52)  RR: 18 (04-07-22 @ 04:52)  SpO2: 96% (04-06-22 @ 15:14)    04-07 @ 07:01  -  04-07 @ 12:25  --------------------------------------------------------  IN: 360 mL / OUT: 0 mL / NET: 360 mL      Height (cm): 180.3 (04-06 @ 22:33)  Weight (kg): 99.3 (04-06 @ 22:33)  BMI (kg/m2): 30.5 (04-06 @ 22:33)  BSA (m2): 2.19 (04-06 @ 22:33)      I&O's Detail    07 Apr 2022 07:01  -  07 Apr 2022 12:25  --------------------------------------------------------  IN:    Oral Fluid: 360 mL  Total IN: 360 mL    OUT:  Total OUT: 0 mL    Total NET: 360 mL            PHYSICAL EXAM:  Constitutional: NAD  HEENT: anicteric sclera, MMM  Neck: No JVD  Respiratory: BS b/l +, Left basal crackles  Cardiovascular: S1, S2, RRR  Gastrointestinal: BS+, soft, NT/ND  Extremities: + peripheral edema, left>right  Neurological: A/O x 3, no focal deficits  Psychiatric: Normal mood, normal affect  : No CVA tenderness. No albarado.   Skin: No rashes  Vascular Access: Lf radiocephalic AVF with bruit    LABS:  04-07    138  |  102  |  82<HH>  ----------------------------<  118<H>  4.6   |  24  |  5.2<HH>    Ca    9.2      07 Apr 2022 05:30  Mg     2.3     04-07    TPro  6.0  /  Alb  4.0  /  TBili  0.3  /  DBili      /  AST  22  /  ALT  18  /  AlkPhos  44  04-07    Creatinine Trend: 5.2 <--, 4.6 <--                        10.4   5.91  )-----------( 142      ( 07 Apr 2022 05:30 )             32.3     Urine Studies:              RADIOLOGY & ADDITIONAL STUDIES:    < from: Xray Chest 1 View- PORTABLE-Routine (Xray Chest 1 View- PORTABLE-Routine in AM.) (04.07.22 @ 05:42) >   Stable left perihilar opacity. No pneumothorax    < end of copied text >  < from: Xray Chest 1 View- PORTABLE-Routine (Xray Chest 1 View- PORTABLE-Routine in AM.) (04.07.22 @ 05:42) >  Increased right basilar opacity/effusion.    < end of copied text >              
Patient is a 71y old  Male who presents with a chief complaint of SOB (07 Apr 2022 12:24)      HPI:  Pt is a 71 male with PMH HTN, HLD, MI, anemia , CKD5 presents to ED complaining of shortness of breath which started 3 weeks ago and has been getting progressively worse. Patient states that little activity gets him sob. He also noted having trouble lying down flat and has had increased LE swelling. Patient had recent echo 1 month ago at [place] which showed significantly reduced LV function (EF 25-30%, G2DD, mild MR). Patient denies any fever, chest pain, palpitations, abdominal pain, nausea, vomiting, diarrhea, dysuria. Patient still has good urine output and has left upper extremity fistula not yet used.     In ED vitals: /62, HR 80, RR 20, T 97.7, Spo2 96% on RA.  Labs significant for BNP 23K, Trop 0.05, CXR looks congested - official read pending.  (06 Apr 2022 21:41)      PAST MEDICAL & SURGICAL HISTORY:  HTN (hypertension)    Kidney failure    Pneumonia, pneumococcal    Anemia    DM (diabetes mellitus)    Myocardial infarction    AV fistula    History of right common carotid artery stent placement    H/O colonoscopy  5-10 years ago                        PREVIOUS DIAGNOSTIC TESTING:      ECHO  FINDINGS:    STRESS  FINDINGS:    CATHETERIZATION  FINDINGS:    MEDICATIONS  (STANDING):  aspirin  chewable 81 milliGRAM(s) Oral daily  atorvastatin 80 milliGRAM(s) Oral at bedtime  clopidogrel Tablet 75 milliGRAM(s) Oral daily  furosemide   Injectable 60 milliGRAM(s) IV Push two times a day  heparin   Injectable 5000 Unit(s) SubCutaneous every 8 hours  loratadine 10 milliGRAM(s) Oral daily  metoprolol tartrate 25 milliGRAM(s) Oral two times a day  multivitamin 1 Tablet(s) Oral daily  pantoprazole    Tablet 40 milliGRAM(s) Oral before breakfast    MEDICATIONS  (PRN):  acetaminophen     Tablet .. 650 milliGRAM(s) Oral every 6 hours PRN Temp greater or equal to 38C (100.4F), Mild Pain (1 - 3)  melatonin 3 milliGRAM(s) Oral at bedtime PRN Insomnia  nitroglycerin     SubLingual 0.4 milliGRAM(s) SubLingual every 5 minutes PRN Chest Pain      FAMILY HISTORY:      SOCIAL HISTORY:    CIGARETTES:    ALCOHOL:        Vital Signs Last 24 Hrs  T(C): 36.1 (07 Apr 2022 04:52), Max: 36.3 (06 Apr 2022 22:33)  T(F): 97 (07 Apr 2022 04:52), Max: 97.4 (06 Apr 2022 22:33)  HR: 76 (07 Apr 2022 14:20) (76 - 86)  BP: 147/80 (07 Apr 2022 14:20) (120/78 - 169/80)  BP(mean): --  RR: 18 (07 Apr 2022 14:20) (17 - 18)  SpO2: --            INTERPRETATION OF TELEMETRY:    ECG:    I&O's Detail    07 Apr 2022 07:01  -  07 Apr 2022 18:21  --------------------------------------------------------  IN:    Oral Fluid: 360 mL  Total IN: 360 mL    OUT:    Voided (mL): 400 mL  Total OUT: 400 mL    Total NET: -40 mL          LABS:                        10.4   5.91  )-----------( 142      ( 07 Apr 2022 05:30 )             32.3     04-07    138  |  102  |  82<HH>  ----------------------------<  118<H>  4.6   |  24  |  5.2<HH>    Ca    9.2      07 Apr 2022 05:30  Mg     2.3     04-07    TPro  6.0  /  Alb  4.0  /  TBili  0.3  /  DBili  x   /  AST  22  /  ALT  18  /  AlkPhos  44  04-07    CARDIAC MARKERS ( 07 Apr 2022 05:30 )  x     / 0.04 ng/mL / x     / x     / x      CARDIAC MARKERS ( 06 Apr 2022 15:50 )  x     / 0.05 ng/mL / x     / x     / x              I&O's Summary    07 Apr 2022 07:01  -  07 Apr 2022 18:21  --------------------------------------------------------  IN: 360 mL / OUT: 400 mL / NET: -40 mL      BNP  RADIOLOGY & ADDITIONAL STUDIES:

## 2022-04-07 NOTE — PROGRESS NOTE ADULT - ASSESSMENT
Patient is a 71y old Male with PMH HTN, HLD, MI, anemia , CKD5 presents to ED complaining of shortness of breath which started 3 weeks ago and has been getting progressively worse.    Currently admitted to medicine with the primary diagnosis of CHF exacerbation         Patient had recent echo 1 month ago at [facility] which showed significantly reduced LV function (EF 25-30%, G2DD, mild MR).        #Misc  - DVT Prophylaxis:  - Diet:  - GI Prophylaxis:  - Activity:  - IV Fluids:  - Code Status:    Dispo: Patient is a 71y old Male with PMH HTN, HLD, MI, anemia , CKD5 presents to ED complaining of shortness of breath which started 3 weeks ago and has been getting progressively worse.    Currently admitted to medicine with the primary diagnosis of CHF exacerbation    #SOB most likely secondary to Acute HFrEF   - On admision BNP 23K  - echo 3/29/22 at [place] showed EF 25-30%, G2DD, mild MR   - repeat echo  - CXR: Increased right basilar opacity/effusion, Stable left perihilar opacity. No pneumothorax  - c/w lasix 60mg IVP BID  - cardio consulted - Dr. Valdez, f/u recs  - cont metoprolol tartrate 25 bid, not on acei/arb likely due to worsening renal function   - monitor Is and Os, daily weights     #CKD5 not yet on HD   #Elevated troponin likely 2/2 ckd   - unknown Cr, GFR baseline  - left arm precautions for AVF   - trop 0.05 -> 0.4  - avoid nephrotoxic agents   - nepro consulted, f/u recs    #HTN   - on amlodipine at home  - c/w nifedipine XL 30mg daily at bedtime    #HLD  - Held fenofibrate due to CKD  - c/w lipitor 80mg daiy    #CAD  - c/w lipitor 80mg daiy  - c/w ASA 81mg daily  - c/w plavix 75mg daily  - c/w metoprolol tart 25mg BID    #CORAZON   - cont multivitamin     #Misc   Diet: DASH   Activity: IAT   GI ppx: PPI   DVT: ppx heparin   Code status: Full code    Dispo: Acute pending Nephro, Cardio eval

## 2022-04-07 NOTE — PROGRESS NOTE ADULT - SUBJECTIVE AND OBJECTIVE BOX
DAWSON FALLON 71y Male  MRN#: [id number]   Hospital Day: 1d    SUBJECTIVE  Patient is a 71y old Male with PMH HTN, HLD, MI, anemia , CKD5 presents to ED complaining of shortness of breath which started 3 weeks ago and has been getting progressively worse.    Currently admitted to medicine with the primary diagnosis of CHF exacerbation      INTERVAL HPI AND OVERNIGHT EVENTS:  Patient was examined and seen at bedside. This morning he is resting comfortably in bed and reports no issues or overnight events.    REVIEW OF SYMPTOMS:  This am he denies any HA, CP, SOB, palpitations, No abd pain, nausea, vomiting or sweats.     OBJECTIVE  PAST MEDICAL & SURGICAL HISTORY  HTN (hypertension)    Kidney failure    Pneumonia, pneumococcal    Anemia    DM (diabetes mellitus)    Myocardial infarction    AV fistula    History of right common carotid artery stent placement    H/O colonoscopy  5-10 years ago      ALLERGIES:  sulfa drugs (Anaphylaxis; Swelling)    MEDICATIONS:  STANDING MEDICATIONS  aspirin  chewable 81 milliGRAM(s) Oral daily  atorvastatin 80 milliGRAM(s) Oral at bedtime  clopidogrel Tablet 75 milliGRAM(s) Oral daily  fenofibrate Tablet 145 milliGRAM(s) Oral at bedtime  furosemide   Injectable 60 milliGRAM(s) IV Push two times a day  heparin   Injectable 5000 Unit(s) SubCutaneous every 8 hours  loratadine 10 milliGRAM(s) Oral daily  metoprolol tartrate 25 milliGRAM(s) Oral two times a day  multivitamin 1 Tablet(s) Oral daily  pantoprazole    Tablet 40 milliGRAM(s) Oral before breakfast    PRN MEDICATIONS  acetaminophen     Tablet .. 650 milliGRAM(s) Oral every 6 hours PRN  melatonin 3 milliGRAM(s) Oral at bedtime PRN  nitroglycerin     SubLingual 0.4 milliGRAM(s) SubLingual every 5 minutes PRN      VITAL SIGNS: Last 24 Hours  T(C): 36.1 (07 Apr 2022 04:52), Max: 36.5 (06 Apr 2022 15:14)  T(F): 97 (07 Apr 2022 04:52), Max: 97.7 (06 Apr 2022 15:14)  HR: 77 (07 Apr 2022 04:52) (77 - 86)  BP: 156/75 (07 Apr 2022 04:52) (143/62 - 169/80)  BP(mean): --  RR: 18 (07 Apr 2022 04:52) (18 - 20)  SpO2: 96% (06 Apr 2022 15:14) (96% - 96%)    LABS:                        10.4   5.91  )-----------( 142      ( 07 Apr 2022 05:30 )             32.3     04-07    138  |  102  |  82<HH>  ----------------------------<  118<H>  4.6   |  24  |  5.2<HH>    Ca    9.2      07 Apr 2022 05:30  Mg     2.3     04-07    TPro  6.0  /  Alb  4.0  /  TBili  0.3  /  DBili  x   /  AST  22  /  ALT  18  /  AlkPhos  44  04-07      Troponin T, Serum: 0.04 ng/mL *HH* (04-07-22 @ 05:30)  Troponin T, Serum: 0.05 ng/mL *HH* (04-06-22 @ 15:50)      CARDIAC MARKERS ( 07 Apr 2022 05:30 )  x     / 0.04 ng/mL / x     / x     / x      CARDIAC MARKERS ( 06 Apr 2022 15:50 )  x     / 0.05 ng/mL / x     / x     / x          RADIOLOGY:      PHYSICAL EXAM:  CONSTITUTIONAL: No acute distress, AAOx3  HEAD: Atraumatic, normocephalic  EYES: conjunctiva and sclera clear  ENT: No JVD  PULMONARY: crackles BL at lung bases  CARDIOVASCULAR: Regular rate and rhythm; no murmurs  GASTROINTESTINAL: Soft, non-tender, non-distended; bowel sounds present  MUSCULOSKELETAL: 2+ peripheral pulses; +2 edema  NEUROLOGY: non-focal  SKIN: No rashes or lesions; warm and dry

## 2022-04-07 NOTE — CONSULT NOTE ADULT - ASSESSMENT
Pt with CKD stage 5, DM for 15 years (was on Ozempic), HTN, CAD, HFrEF, admitted with fluid overload.    # CKD 5 - pt needs to start HD for fluid overload, advanced CKD  AVF ok to use as per Dr Ji  2 hr 3 k bath  UF 0.5 L  Etiology of CKD  - likely diabetic nephropathy  -pt  had a nephrotic range proteinuria 5 g/g with negative w/u for myeloma, vasculitis APL2R Ab  -check phos, iPTH, UA  -check UA   -check Bladder sono for PVR  - had a Kidney Tx eval at [place]    #HFrEF - please consult cardiology for a cath (Young Calvillo)  had a recent stress test  -cont Lasix 60 mg IV q12  - asymmetric edema on LE L>R, please obtain LE Dupplex    #Anemia - s/p VEnofer and SINCERE    Pt requested HD as OP at [facility]. 
pt w/ cad. pt w/ sob w/ volume overload. pt w cri and cardiomyopathy. pt starting on dialysis. advise muga. plan for cath to assess cad for transplant as well as reported worsened lv.

## 2022-04-08 LAB
ALBUMIN SERPL ELPH-MCNC: 4.2 G/DL — SIGNIFICANT CHANGE UP (ref 3.5–5.2)
ALP SERPL-CCNC: 51 U/L — SIGNIFICANT CHANGE UP (ref 30–115)
ALT FLD-CCNC: 19 U/L — SIGNIFICANT CHANGE UP (ref 0–41)
ANION GAP SERPL CALC-SCNC: 13 MMOL/L — SIGNIFICANT CHANGE UP (ref 7–14)
ANION GAP SERPL CALC-SCNC: 14 MMOL/L — SIGNIFICANT CHANGE UP (ref 7–14)
AST SERPL-CCNC: 25 U/L — SIGNIFICANT CHANGE UP (ref 0–41)
BASOPHILS # BLD AUTO: 0.06 K/UL — SIGNIFICANT CHANGE UP (ref 0–0.2)
BASOPHILS NFR BLD AUTO: 0.7 % — SIGNIFICANT CHANGE UP (ref 0–1)
BILIRUB SERPL-MCNC: 0.4 MG/DL — SIGNIFICANT CHANGE UP (ref 0.2–1.2)
BUN SERPL-MCNC: 68 MG/DL — CRITICAL HIGH (ref 10–20)
BUN SERPL-MCNC: 71 MG/DL — CRITICAL HIGH (ref 10–20)
CALCIUM SERPL-MCNC: 9.3 MG/DL — SIGNIFICANT CHANGE UP (ref 8.5–10.1)
CALCIUM SERPL-MCNC: 9.4 MG/DL — SIGNIFICANT CHANGE UP (ref 8.5–10.1)
CHLORIDE SERPL-SCNC: 101 MMOL/L — SIGNIFICANT CHANGE UP (ref 98–110)
CHLORIDE SERPL-SCNC: 101 MMOL/L — SIGNIFICANT CHANGE UP (ref 98–110)
CO2 SERPL-SCNC: 24 MMOL/L — SIGNIFICANT CHANGE UP (ref 17–32)
CO2 SERPL-SCNC: 25 MMOL/L — SIGNIFICANT CHANGE UP (ref 17–32)
CREAT SERPL-MCNC: 3.9 MG/DL — HIGH (ref 0.7–1.5)
CREAT SERPL-MCNC: 4.1 MG/DL — CRITICAL HIGH (ref 0.7–1.5)
EGFR: 15 ML/MIN/1.73M2 — LOW
EGFR: 16 ML/MIN/1.73M2 — LOW
EOSINOPHIL # BLD AUTO: 0.12 K/UL — SIGNIFICANT CHANGE UP (ref 0–0.7)
EOSINOPHIL NFR BLD AUTO: 1.3 % — SIGNIFICANT CHANGE UP (ref 0–8)
GLUCOSE BLDC GLUCOMTR-MCNC: 129 MG/DL — HIGH (ref 70–99)
GLUCOSE BLDC GLUCOMTR-MCNC: 203 MG/DL — HIGH (ref 70–99)
GLUCOSE BLDC GLUCOMTR-MCNC: 223 MG/DL — HIGH (ref 70–99)
GLUCOSE SERPL-MCNC: 132 MG/DL — HIGH (ref 70–99)
GLUCOSE SERPL-MCNC: 169 MG/DL — HIGH (ref 70–99)
HBV CORE AB SER-ACNC: SIGNIFICANT CHANGE UP
HBV SURFACE AB SER-ACNC: <3 MIU/ML — LOW
HBV SURFACE AB SER-ACNC: SIGNIFICANT CHANGE UP
HBV SURFACE AG SER-ACNC: SIGNIFICANT CHANGE UP
HCT VFR BLD CALC: 34 % — LOW (ref 42–52)
HGB BLD-MCNC: 11.1 G/DL — LOW (ref 14–18)
IMM GRANULOCYTES NFR BLD AUTO: 0.3 % — SIGNIFICANT CHANGE UP (ref 0.1–0.3)
LYMPHOCYTES # BLD AUTO: 0.48 K/UL — LOW (ref 1.2–3.4)
LYMPHOCYTES # BLD AUTO: 5.3 % — LOW (ref 20.5–51.1)
MAGNESIUM SERPL-MCNC: 2.1 MG/DL — SIGNIFICANT CHANGE UP (ref 1.8–2.4)
MAGNESIUM SERPL-MCNC: 2.1 MG/DL — SIGNIFICANT CHANGE UP (ref 1.8–2.4)
MCHC RBC-ENTMCNC: 27.3 PG — SIGNIFICANT CHANGE UP (ref 27–31)
MCHC RBC-ENTMCNC: 32.6 G/DL — SIGNIFICANT CHANGE UP (ref 32–37)
MCV RBC AUTO: 83.5 FL — SIGNIFICANT CHANGE UP (ref 80–94)
MONOCYTES # BLD AUTO: 0.72 K/UL — HIGH (ref 0.1–0.6)
MONOCYTES NFR BLD AUTO: 8 % — SIGNIFICANT CHANGE UP (ref 1.7–9.3)
NEUTROPHILS # BLD AUTO: 7.6 K/UL — HIGH (ref 1.4–6.5)
NEUTROPHILS NFR BLD AUTO: 84.4 % — HIGH (ref 42.2–75.2)
NRBC # BLD: 0 /100 WBCS — SIGNIFICANT CHANGE UP (ref 0–0)
PLATELET # BLD AUTO: 161 K/UL — SIGNIFICANT CHANGE UP (ref 130–400)
POTASSIUM SERPL-MCNC: 4.2 MMOL/L — SIGNIFICANT CHANGE UP (ref 3.5–5)
POTASSIUM SERPL-MCNC: 4.9 MMOL/L — SIGNIFICANT CHANGE UP (ref 3.5–5)
POTASSIUM SERPL-SCNC: 4.2 MMOL/L — SIGNIFICANT CHANGE UP (ref 3.5–5)
POTASSIUM SERPL-SCNC: 4.9 MMOL/L — SIGNIFICANT CHANGE UP (ref 3.5–5)
PROT SERPL-MCNC: 6.5 G/DL — SIGNIFICANT CHANGE UP (ref 6–8)
RBC # BLD: 4.07 M/UL — LOW (ref 4.7–6.1)
RBC # FLD: 15.7 % — HIGH (ref 11.5–14.5)
SODIUM SERPL-SCNC: 138 MMOL/L — SIGNIFICANT CHANGE UP (ref 135–146)
SODIUM SERPL-SCNC: 140 MMOL/L — SIGNIFICANT CHANGE UP (ref 135–146)
WBC # BLD: 9.01 K/UL — SIGNIFICANT CHANGE UP (ref 4.8–10.8)
WBC # FLD AUTO: 9.01 K/UL — SIGNIFICANT CHANGE UP (ref 4.8–10.8)

## 2022-04-08 PROCEDURE — 71046 X-RAY EXAM CHEST 2 VIEWS: CPT | Mod: 26

## 2022-04-08 PROCEDURE — 93306 TTE W/DOPPLER COMPLETE: CPT | Mod: 26

## 2022-04-08 PROCEDURE — 99233 SBSQ HOSP IP/OBS HIGH 50: CPT

## 2022-04-08 PROCEDURE — 93970 EXTREMITY STUDY: CPT | Mod: 26

## 2022-04-08 RX ORDER — ALPRAZOLAM 0.25 MG
0.5 TABLET ORAL ONCE
Refills: 0 | Status: DISCONTINUED | OUTPATIENT
Start: 2022-04-08 | End: 2022-04-08

## 2022-04-08 RX ORDER — FUROSEMIDE 40 MG
80 TABLET ORAL
Refills: 0 | Status: DISCONTINUED | OUTPATIENT
Start: 2022-04-08 | End: 2022-04-14

## 2022-04-08 RX ADMIN — HEPARIN SODIUM 5000 UNIT(S): 5000 INJECTION INTRAVENOUS; SUBCUTANEOUS at 21:47

## 2022-04-08 RX ADMIN — Medication 25 MILLIGRAM(S): at 05:36

## 2022-04-08 RX ADMIN — Medication 25 MILLIGRAM(S): at 18:42

## 2022-04-08 RX ADMIN — PANTOPRAZOLE SODIUM 40 MILLIGRAM(S): 20 TABLET, DELAYED RELEASE ORAL at 06:06

## 2022-04-08 RX ADMIN — LORATADINE 10 MILLIGRAM(S): 10 TABLET ORAL at 13:42

## 2022-04-08 RX ADMIN — CLOPIDOGREL BISULFATE 75 MILLIGRAM(S): 75 TABLET, FILM COATED ORAL at 13:39

## 2022-04-08 RX ADMIN — Medication 81 MILLIGRAM(S): at 13:39

## 2022-04-08 RX ADMIN — ATORVASTATIN CALCIUM 80 MILLIGRAM(S): 80 TABLET, FILM COATED ORAL at 21:47

## 2022-04-08 RX ADMIN — HEPARIN SODIUM 5000 UNIT(S): 5000 INJECTION INTRAVENOUS; SUBCUTANEOUS at 13:40

## 2022-04-08 RX ADMIN — Medication 80 MILLIGRAM(S): at 18:42

## 2022-04-08 RX ADMIN — Medication 60 MILLIGRAM(S): at 05:36

## 2022-04-08 RX ADMIN — Medication 1 TABLET(S): at 13:42

## 2022-04-08 RX ADMIN — HEPARIN SODIUM 5000 UNIT(S): 5000 INJECTION INTRAVENOUS; SUBCUTANEOUS at 05:37

## 2022-04-08 RX ADMIN — Medication 0.5 MILLIGRAM(S): at 06:11

## 2022-04-08 NOTE — PROGRESS NOTE ADULT - SUBJECTIVE AND OBJECTIVE BOX
SUBJ:No chest pain or shortness of breath      MEDICATIONS  (STANDING):  aspirin  chewable 81 milliGRAM(s) Oral daily  atorvastatin 80 milliGRAM(s) Oral at bedtime  clopidogrel Tablet 75 milliGRAM(s) Oral daily  furosemide   Injectable 80 milliGRAM(s) IV Push two times a day  heparin   Injectable 5000 Unit(s) SubCutaneous every 8 hours  loratadine 10 milliGRAM(s) Oral daily  metoprolol tartrate 25 milliGRAM(s) Oral two times a day  multivitamin 1 Tablet(s) Oral daily  pantoprazole    Tablet 40 milliGRAM(s) Oral before breakfast    MEDICATIONS  (PRN):  acetaminophen     Tablet .. 650 milliGRAM(s) Oral every 6 hours PRN Temp greater or equal to 38C (100.4F), Mild Pain (1 - 3)  melatonin 3 milliGRAM(s) Oral at bedtime PRN Insomnia  nitroglycerin     SubLingual 0.4 milliGRAM(s) SubLingual every 5 minutes PRN Chest Pain            Vital Signs Last 24 Hrs  T(C): 36.2 (08 Apr 2022 20:06), Max: 36.2 (08 Apr 2022 20:06)  T(F): 97.2 (08 Apr 2022 20:06), Max: 97.2 (08 Apr 2022 20:06)  HR: 84 (08 Apr 2022 20:06) (78 - 87)  BP: 168/73 (08 Apr 2022 20:06) (98/55 - 169/74)  BP(mean): --  RR: 18 (08 Apr 2022 20:06) (18 - 18)  SpO2: 100% (08 Apr 2022 12:45) (95% - 100%)      ECG:NML    TTE:    LABS:                        11.1   9.01  )-----------( 161      ( 08 Apr 2022 05:40 )             34.0     04-08    140  |  101  |  68<HH>  ----------------------------<  132<H>  4.9   |  25  |  3.9<H>    Ca    9.4      08 Apr 2022 05:40  Mg     2.1     04-08    TPro  6.5  /  Alb  4.2  /  TBili  0.4  /  DBili  x   /  AST  25  /  ALT  19  /  AlkPhos  51  04-08    CARDIAC MARKERS ( 07 Apr 2022 05:30 )  x     / 0.04 ng/mL / x     / x     / x              I&O's Summary    07 Apr 2022 07:01  -  08 Apr 2022 07:00  --------------------------------------------------------  IN: 1040 mL / OUT: 1100 mL / NET: -60 mL    08 Apr 2022 07:01  -  08 Apr 2022 22:29  --------------------------------------------------------  IN: 1469 mL / OUT: 950 mL / NET: 519 mL      BNP

## 2022-04-08 NOTE — PROGRESS NOTE ADULT - ASSESSMENT
Pt with CKD stage 5, DM for 15 years (was on Ozempic), HTN, CAD, HFrEF, admitted with fluid overload.    # CKD 5 -  started on  HD for fluid overload, advanced CKD  AVF ok to use as per Dr Ji  2nd HD today for fluid overload  2.5 hrs 3 K bath  UF 1.5 Liters  cont IV Lasix 80 q12  next HD tomorrow  Cath on  monday  Etiology of CKD  - likely diabetic nephropathy  -pt  had a nephrotic range proteinuria 5 g/g with negative w/u for myeloma, vasculitis APL2R Ab  -check phos, iPTH, UA  -check UA   -check Bladder sono for PVR  - had a Kidney Tx eval at [place]    #HFrEF - please consult cardiology for a cath (Young Calvillo)  had a recent stress test  - asymmetric edema on LE L>R, please obtain LE Dupplex    #Anemia - s/p VEnofer and SINCERE    Pt requested HD as OP at [facility].

## 2022-04-08 NOTE — PROGRESS NOTE ADULT - SUBJECTIVE AND OBJECTIVE BOX
DAWSON FALLON 71y Male  MRN#: [id number]   Hospital Day: 2d    SUBJECTIVE  Patient is a 71y old Male who presents with a chief complaint of SOB (07 Apr 2022 18:20)  Currently admitted to medicine with the primary diagnosis of CHF exacerbation      INTERVAL HPI AND OVERNIGHT EVENTS:  Patient was examined and seen at bedside. This morning he is resting comfortably in bed and reports no issues or overnight events.    REVIEW OF SYMPTOMS:      OBJECTIVE  PAST MEDICAL & SURGICAL HISTORY  HTN (hypertension)    Kidney failure    Pneumonia, pneumococcal    Anemia    DM (diabetes mellitus)    Myocardial infarction    AV fistula    History of right common carotid artery stent placement    H/O colonoscopy  5-10 years ago      ALLERGIES:  sulfa drugs (Anaphylaxis; Swelling)    MEDICATIONS:  STANDING MEDICATIONS  aspirin  chewable 81 milliGRAM(s) Oral daily  atorvastatin 80 milliGRAM(s) Oral at bedtime  clopidogrel Tablet 75 milliGRAM(s) Oral daily  furosemide   Injectable 60 milliGRAM(s) IV Push two times a day  heparin   Injectable 5000 Unit(s) SubCutaneous every 8 hours  loratadine 10 milliGRAM(s) Oral daily  metoprolol tartrate 25 milliGRAM(s) Oral two times a day  multivitamin 1 Tablet(s) Oral daily  pantoprazole    Tablet 40 milliGRAM(s) Oral before breakfast    PRN MEDICATIONS  acetaminophen     Tablet .. 650 milliGRAM(s) Oral every 6 hours PRN  melatonin 3 milliGRAM(s) Oral at bedtime PRN  nitroglycerin     SubLingual 0.4 milliGRAM(s) SubLingual every 5 minutes PRN      VITAL SIGNS: Last 24 Hours  T(C): 35.8 (08 Apr 2022 04:39), Max: 36 (07 Apr 2022 20:01)  T(F): 96.5 (08 Apr 2022 04:39), Max: 96.8 (07 Apr 2022 20:01)  HR: 87 (08 Apr 2022 04:39) (76 - 87)  BP: 98/55 (08 Apr 2022 04:39) (98/55 - 190/84)  BP(mean): --  RR: 18 (08 Apr 2022 04:39) (17 - 18)  SpO2: --    LABS:                        11.1   9.01  )-----------( 161      ( 08 Apr 2022 05:40 )             34.0     04-08    140  |  101  |  68<HH>  ----------------------------<  132<H>  4.9   |  25  |  3.9<H>    Ca    9.4      08 Apr 2022 05:40  Mg     2.1     04-08    TPro  6.5  /  Alb  4.2  /  TBili  0.4  /  DBili  x   /  AST  25  /  ALT  19  /  AlkPhos  51  04-08      CARDIAC MARKERS ( 07 Apr 2022 05:30 )  x     / 0.04 ng/mL / x     / x     / x      CARDIAC MARKERS ( 06 Apr 2022 15:50 )  x     / 0.05 ng/mL / x     / x     / x          RADIOLOGY:      PHYSICAL EXAM:  CONSTITUTIONAL: No acute distress, well-developed, well-groomed, AAOx3  HEAD: Atraumatic, normocephalic  EYES: EOM intact, PERRLA, conjunctiva and sclera clear  ENT: Supple, no masses, no thyromegaly, no bruits, no JVD; moist mucous membranes  PULMONARY: Clear to auscultation bilaterally; no wheezes, rales, or rhonchi  CARDIOVASCULAR: Regular rate and rhythm; no murmurs, rubs, or gallops  GASTROINTESTINAL: Soft, non-tender, non-distended; bowel sounds present  MUSCULOSKELETAL: 2+ peripheral pulses; no clubbing, no cyanosis, no edema  NEUROLOGY: non-focal  SKIN: No rashes or lesions; warm and dry     DAWSON FALLON 71y Male  MRN#: [id number]   Hospital Day: 2d    SUBJECTIVE  Patient is a 71y old Male with PMH HTN, HLD, MI, anemia , CKD5 presents to ED complaining of shortness of breath which started 3 weeks ago and has been getting progressively worse.    Currently admitted to medicine with the primary diagnosis of CHF exacerbation      INTERVAL HPI AND OVERNIGHT EVENTS:  Patient was examined and seen at bedside. This morning he is resting comfortably in bed and reports no issues or overnight events.    REVIEW OF SYMPTOMS:  Admits to being tired since dialysis. Denies any HA, SOB, abd pain. No nausea, sweats or chills    OBJECTIVE  PAST MEDICAL & SURGICAL HISTORY  HTN (hypertension)    Kidney failure    Pneumonia, pneumococcal    Anemia    DM (diabetes mellitus)    Myocardial infarction    AV fistula    History of right common carotid artery stent placement    H/O colonoscopy  5-10 years ago      ALLERGIES:  sulfa drugs (Anaphylaxis; Swelling)    MEDICATIONS:  STANDING MEDICATIONS  aspirin  chewable 81 milliGRAM(s) Oral daily  atorvastatin 80 milliGRAM(s) Oral at bedtime  clopidogrel Tablet 75 milliGRAM(s) Oral daily  furosemide   Injectable 60 milliGRAM(s) IV Push two times a day  heparin   Injectable 5000 Unit(s) SubCutaneous every 8 hours  loratadine 10 milliGRAM(s) Oral daily  metoprolol tartrate 25 milliGRAM(s) Oral two times a day  multivitamin 1 Tablet(s) Oral daily  pantoprazole    Tablet 40 milliGRAM(s) Oral before breakfast    PRN MEDICATIONS  acetaminophen     Tablet .. 650 milliGRAM(s) Oral every 6 hours PRN  melatonin 3 milliGRAM(s) Oral at bedtime PRN  nitroglycerin     SubLingual 0.4 milliGRAM(s) SubLingual every 5 minutes PRN      VITAL SIGNS: Last 24 Hours  T(C): 35.8 (08 Apr 2022 04:39), Max: 36 (07 Apr 2022 20:01)  T(F): 96.5 (08 Apr 2022 04:39), Max: 96.8 (07 Apr 2022 20:01)  HR: 87 (08 Apr 2022 04:39) (76 - 87)  BP: 98/55 (08 Apr 2022 04:39) (98/55 - 190/84)  BP(mean): --  RR: 18 (08 Apr 2022 04:39) (17 - 18)  SpO2: --    LABS:                        11.1   9.01  )-----------( 161      ( 08 Apr 2022 05:40 )             34.0     04-08    140  |  101  |  68<HH>  ----------------------------<  132<H>  4.9   |  25  |  3.9<H>    Ca    9.4      08 Apr 2022 05:40  Mg     2.1     04-08    TPro  6.5  /  Alb  4.2  /  TBili  0.4  /  DBili  x   /  AST  25  /  ALT  19  /  AlkPhos  51  04-08      CARDIAC MARKERS ( 07 Apr 2022 05:30 )  x     / 0.04 ng/mL / x     / x     / x      CARDIAC MARKERS ( 06 Apr 2022 15:50 )  x     / 0.05 ng/mL / x     / x     / x          RADIOLOGY:      PHYSICAL EXAM:  CONSTITUTIONAL: No acute distress, AAOx3  HEAD: Atraumatic, normocephalic  EYES: conjunctiva and sclera clear  ENT: No JVD  PULMONARY: crackles BL at lung bases  CARDIOVASCULAR: Regular rate and rhythm; no murmurs  GASTROINTESTINAL: Soft, non-tender, non-distended; bowel sounds present  MUSCULOSKELETAL: 2+ peripheral pulses; +2 edema  NEUROLOGY: non-focal  SKIN: No rashes or lesions; warm and dry

## 2022-04-08 NOTE — PROGRESS NOTE ADULT - SUBJECTIVE AND OBJECTIVE BOX
FALLONDAWSON  71y Male    CHIEF COMPLAINT:    Patient is a 71y old  Male who presents with a chief complaint of SOB (2022 08:12)      INTERVAL HPI/OVERNIGHT EVENTS:    Patient seen and examined.    ROS: All other systems are negative.    Vital Signs:    T(F): 96.5 (22 @ 04:39), Max: 96.8 (22 @ 20:01)  HR: 87 (22 @ 04:39) (76 - 87)  BP: 98/55 (22 @ 04:39) (98/55 - 190/84)  RR: 18 (22 @ 04:39) (17 - 18)  SpO2: --  I&O's Summary    2022 07:01  -  2022 07:00  --------------------------------------------------------  IN: 1040 mL / OUT: 1100 mL / NET: -60 mL      Daily     Daily Weight in k.4 (2022 04:39)  CAPILLARY BLOOD GLUCOSE      POCT Blood Glucose.: 129 mg/dL (2022 07:47)      PHYSICAL EXAM:    GENERAL:  NAD  SKIN: No rashes or lesions  HENT: Atraumatic. Normocephalic. PERRL. Moist membranes.  NECK: Supple, No JVD. No lymphadenopathy.  PULMONARY: CTA B/L. No wheezing. No rales  CVS: Normal S1, S2. Rate and Rhythm are regular. No murmurs.  ABDOMEN/GI: Soft, Nontender, Nondistended; BS present  EXTREMITIES: Peripheral pulses intact. No edema B/L LE.  NEUROLOGIC:  No motor or sensory deficit.  PSYCH: Alert & oriented x 3    Consultant(s) Notes Reviewed:  [x ] YES  [ ] NO  Care Discussed with Consultants/Other Providers [ x] YES  [ ] NO    EKG reviewed  Telemetry reviewed    LABS:                        11.1   9.01  )-----------( 161      ( 2022 05:40 )             34.0     04-08    140  |  101  |  68<HH>  ----------------------------<  132<H>  4.9   |  25  |  3.9<H>    Ca    9.4      2022 05:40  Mg     2.1         TPro  6.5  /  Alb  4.2  /  TBili  0.4  /  DBili  x   /  AST  25  /  ALT  19  /  AlkPhos  51        Serum Pro-Brain Natriuretic Peptide: 44327 pg/mL (22 @ 15:50)    Trop 0.04, CKMB --, CK --, 22 @ 05:30  Trop 0.05, CKMB --, CK --, 22 @ 15:50        RADIOLOGY & ADDITIONAL TESTS:      Imaging or report Personally Reviewed:  [ ] YES  [ ] NO    Medications:  Standing  aspirin  chewable 81 milliGRAM(s) Oral daily  atorvastatin 80 milliGRAM(s) Oral at bedtime  clopidogrel Tablet 75 milliGRAM(s) Oral daily  furosemide   Injectable 60 milliGRAM(s) IV Push two times a day  heparin   Injectable 5000 Unit(s) SubCutaneous every 8 hours  loratadine 10 milliGRAM(s) Oral daily  metoprolol tartrate 25 milliGRAM(s) Oral two times a day  multivitamin 1 Tablet(s) Oral daily  pantoprazole    Tablet 40 milliGRAM(s) Oral before breakfast    PRN Meds  acetaminophen     Tablet .. 650 milliGRAM(s) Oral every 6 hours PRN  melatonin 3 milliGRAM(s) Oral at bedtime PRN  nitroglycerin     SubLingual 0.4 milliGRAM(s) SubLingual every 5 minutes PRN      Case discussed with resident    Care discussed with pt/family           ADWSON FALLON  71y Male    CHIEF COMPLAINT:    Patient is a 71y old  Male who presents with a chief complaint of SOB (2022 08:12)      INTERVAL HPI/OVERNIGHT EVENTS:    Patient seen and examined. Breathing has improve but still c/o LE swelling and sob. No cp. No palpitations.     ROS: All other systems are negative.    Vital Signs:    T(F): 96.5 (22 @ 04:39), Max: 96.8 (22 @ 20:01)  HR: 87 (22 @ 04:39) (76 - 87)  BP: 98/55 (22 @ 04:39) (98/55 - 190/84)  RR: 18 (22 @ 04:39) (17 - 18)  SpO2: --  I&O's Summary    2022 07:01  -  2022 07:00  --------------------------------------------------------  IN: 1040 mL / OUT: 1100 mL / NET: -60 mL      Daily     Daily Weight in k.4 (2022 04:39)  CAPILLARY BLOOD GLUCOSE      POCT Blood Glucose.: 129 mg/dL (2022 07:47)      PHYSICAL EXAM:    GENERAL:  NAD  SKIN: No rashes or lesions  HENT: Atraumatic. Normocephalic. PERRL. Moist membranes.  NECK: Supple, No JVD. No lymphadenopathy.  PULMONARY: Decreased BS in the base B/L. No wheezing. No rales  CVS: Normal S1, S2. Rate and Rhythm are regular. No murmurs.  ABDOMEN/GI: Soft, Nontender, Nondistended; BS present  EXTREMITIES: Peripheral pulses intact. 1+ pitting edema B/L LE. R>L  NEUROLOGIC:  No motor or sensory deficit.  PSYCH: Alert & oriented x 3    Consultant(s) Notes Reviewed:  [x ] YES  [ ] NO  Care Discussed with Consultants/Other Providers [ x] YES  [ ] NO    EKG reviewed  Telemetry reviewed    LABS:                        11.1   9.01  )-----------( 161      ( 2022 05:40 )             34.0     04-08    140  |  101  |  68<HH>  ----------------------------<  132<H>  4.9   |  25  |  3.9<H>    Ca    9.4      2022 05:40  Mg     2.1         TPro  6.5  /  Alb  4.2  /  TBili  0.4  /  DBili  x   /  AST  25  /  ALT  19  /  AlkPhos  51        Serum Pro-Brain Natriuretic Peptide: 25326 pg/mL (22 @ 15:50)    Trop 0.04, CKMB --, CK --, 22 @ 05:30  Trop 0.05, CKMB --, CK --, 22 @ 15:50        RADIOLOGY & ADDITIONAL TESTS:      Imaging or report Personally Reviewed:  [ ] YES  [ ] NO    Medications:  Standing  aspirin  chewable 81 milliGRAM(s) Oral daily  atorvastatin 80 milliGRAM(s) Oral at bedtime  clopidogrel Tablet 75 milliGRAM(s) Oral daily  furosemide   Injectable 60 milliGRAM(s) IV Push two times a day  heparin   Injectable 5000 Unit(s) SubCutaneous every 8 hours  loratadine 10 milliGRAM(s) Oral daily  metoprolol tartrate 25 milliGRAM(s) Oral two times a day  multivitamin 1 Tablet(s) Oral daily  pantoprazole    Tablet 40 milliGRAM(s) Oral before breakfast    PRN Meds  acetaminophen     Tablet .. 650 milliGRAM(s) Oral every 6 hours PRN  melatonin 3 milliGRAM(s) Oral at bedtime PRN  nitroglycerin     SubLingual 0.4 milliGRAM(s) SubLingual every 5 minutes PRN      Case discussed with resident    Care discussed with pt/family

## 2022-04-08 NOTE — PROGRESS NOTE ADULT - ASSESSMENT
pt w/ sob w/ ineffective dialysis. give stat dose lasix 80 iv now and bipap. consider transfer to unit and lasix drip. contact renal for options for dialysis.

## 2022-04-08 NOTE — PROGRESS NOTE ADULT - SUBJECTIVE AND OBJECTIVE BOX
Nephrology progress note    Patient is seen and examined, events over the last 24 h noted .  Pt had orthopnea lst again.  Had 1st HD yesterday with 0.5 L off and 1 L UO with LAsix  2nd HD today  Allergies:  sulfa drugs (Anaphylaxis; Swelling)    Hospital Medications:   MEDICATIONS  (STANDING):  aspirin  chewable 81 milliGRAM(s) Oral daily  atorvastatin 80 milliGRAM(s) Oral at bedtime  clopidogrel Tablet 75 milliGRAM(s) Oral daily  furosemide   Injectable 60 milliGRAM(s) IV Push two times a day  heparin   Injectable 5000 Unit(s) SubCutaneous every 8 hours  loratadine 10 milliGRAM(s) Oral daily  metoprolol tartrate 25 milliGRAM(s) Oral two times a day  multivitamin 1 Tablet(s) Oral daily  pantoprazole    Tablet 40 milliGRAM(s) Oral before breakfast        VITALS:  T(F): 96.5 (04-08-22 @ 04:39), Max: 96.8 (04-07-22 @ 20:01)  HR: 78 (04-08-22 @ 11:15)  BP: 160/68 (04-08-22 @ 11:15)  RR: 18 (04-08-22 @ 04:39)  SpO2: 95% (04-08-22 @ 11:15)  Wt(kg): --    04-07 @ 07:01  -  04-08 @ 07:00  --------------------------------------------------------  IN: 1040 mL / OUT: 1100 mL / NET: -60 mL    04-08 @ 07:01  -  04-08 @ 12:28  --------------------------------------------------------  IN: 450 mL / OUT: 0 mL / NET: 450 mL          PHYSICAL EXAM:  Constitutional: NAD  HEENT: anicteric sclera  Neck: No JVD  Respiratory: basilar crackles  Cardiovascular: S1, S2, RRR  Gastrointestinal: BS+, soft, NT/ND  Extremities: + peripheral edema  Neurological: A/O x 3, no focal deficits  : No CVA tenderness. No albarado.   Skin: No rashes  Vascular Access: Lt AVF    LABS:  04-08    140  |  101  |  68<HH>  ----------------------------<  132<H>  4.9   |  25  |  3.9<H>    Ca    9.4      08 Apr 2022 05:40  Mg     2.1     04-08    TPro  6.5  /  Alb  4.2  /  TBili  0.4  /  DBili      /  AST  25  /  ALT  19  /  AlkPhos  51  04-08                          11.1   9.01  )-----------( 161      ( 08 Apr 2022 05:40 )             34.0       Urine Studies:      RADIOLOGY & ADDITIONAL STUDIES:  < from: Xray Chest 1 View- PORTABLE-Routine (Xray Chest 1 View- PORTABLE-Routine in AM.) (04.07.22 @ 05:42) >  Increased right basilar opacity/effusion.     Stable left perihilar opacity. No pneumothorax    < end of copied text >

## 2022-04-08 NOTE — PROGRESS NOTE ADULT - ASSESSMENT
Patient is a 71y old Male with PMH HTN, HLD, MI, anemia , CKD5 presents to ED complaining of shortness of breath which started 3 weeks ago and has been getting progressively worse.    Currently admitted to medicine with the primary diagnosis of CHF exacerbation    #SOB most likely secondary to Acute HFrEF   - On admision BNP 23K  - echo 3/29/22 at [place] showed EF 25-30%, G2DD, mild MR   - repeat echo  - CXR: Increased right basilar opacity/effusion, Stable left perihilar opacity. No pneumothorax  - c/w lasix 60mg IVP BID  - cardio consulted - Dr. Valdez, f/u recs  - cont metoprolol tartrate 25 bid, not on acei/arb likely due to worsening renal function   - monitor Is and Os, daily weights     #CKD5 not yet on HD   #Elevated troponin likely 2/2 ckd   - unknown Cr, GFR baseline  - left arm precautions for AVF   - trop 0.05 -> 0.4  - avoid nephrotoxic agents   - nepro consulted, f/u recs    #HTN   - on amlodipine at home  - c/w nifedipine XL 30mg daily at bedtime    #HLD  - Held fenofibrate due to CKD  - c/w lipitor 80mg daiy    #CAD  - c/w lipitor 80mg daiy  - c/w ASA 81mg daily  - c/w plavix 75mg daily  - c/w metoprolol tart 25mg BID    #CORAZON   - cont multivitamin     #Misc   Diet: DASH   Activity: IAT   GI ppx: PPI   DVT: ppx heparin   Code status: Full code    Dispo: Acute pending Nephro, Cardio eval Patient is a 71y old Male with PMH HTN, HLD, MI, anemia , CKD5 presents to ED complaining of shortness of breath which started 3 weeks ago and has been getting progressively worse.    Currently admitted to medicine with the primary diagnosis of CHF exacerbation    #SOB most likely secondary to Acute HFrEF   - On admision BNP 23K  - echo 3/29/22 at [place] showed EF 25-30%, G2DD, mild MR   - repeat echo pending  - CXR: Increased right basilar opacity/effusion, Stable left perihilar opacity. No pneumothorax  - c/w lasix 60mg IVP BID  - cardio consulted - Dr. Valdez: needs muga scan and possible cath monday  - cont metoprolol tartrate 25 bid, not on acei/arb likely due to worsening renal function   - monitor Is and Os, daily weights     #CKD5 not yet on HD   #Elevated troponin likely 2/2 ckd   - unknown Cr, GFR baseline  - left arm precautions for AVF   - trop 0.05 -> 0.4  - avoid nephrotoxic agents   - segundoro consulted - s/p first HD (4/7)    #HTN - controlled  - on amlodipine at home  - monitor off BP meds for now    #HLD  - Held fenofibrate due to CKD  - c/w lipitor 80mg daiy    #CAD  - c/w lipitor 80mg daiy  - c/w ASA 81mg daily  - c/w plavix 75mg daily  - c/w metoprolol tart 25mg BID    #CORAZON   - cont multivitamin     #Misc   Diet: DASH   Activity: IAT   GI ppx: PPI   DVT: ppx heparin   Code status: Full code    Dispo: Acute pending euvolemia Patient is a 71y old Male with PMH HTN, HLD, MI, anemia , CKD5 presents to ED complaining of shortness of breath which started 3 weeks ago and has been getting progressively worse.    Currently admitted to medicine with the primary diagnosis of CHF exacerbation    #SOB most likely secondary to Acute HFrEF   - On admision BNP 23K  - echo 3/29/22 at [place] showed EF 25-30%, G2DD, mild MR   - repeat echo pending  - CXR: Increased right basilar opacity/effusion, Stable left perihilar opacity. No pneumothorax  - c/w lasix 60mg IVP BID  - cardio consulted - Dr. Valdez: needs muga scan and possible cath monday  - cont metoprolol tartrate 25 bid, not on acei/arb likely due to worsening renal function   - monitor Is and Os, daily weights     #CKD5 not yet on HD   #Elevated troponin likely 2/2 ckd   - unknown Cr, GFR baseline  - left arm precautions for AVF   - trop 0.05 -> 0.4  - avoid nephrotoxic agents   - segundoro consulted - s/p first HD (4/7)    #HTN - controlled  - on amlodipine at home  - monitor off BP meds for now    #HLD  - Held fenofibrate due to CKD  - c/w lipitor 80mg daiy    #CAD  - c/w lipitor 80mg daiy  - c/w ASA 81mg daily  - c/w plavix 75mg daily  - c/w metoprolol tart 25mg BID    #CORAZON   - cont multivitamin     #Misc   Diet: DASH   Activity: IAT   GI ppx: PPI   DVT: ppx heparin   Code status: Full code    Dispo: Acute pending euvolemia, MUGA scan, possible cath on Monday Patient is a 71y old Male with PMH HTN, HLD, MI, anemia , CKD5 presents to ED complaining of shortness of breath which started 3 weeks ago and has been getting progressively worse.    Currently admitted to medicine with the primary diagnosis of CHF exacerbation    #SOB most likely secondary to Acute HFrEF   - On admision BNP 23K  - echo 3/29/22 at [place] showed EF 25-30%, G2DD, mild MR   - repeat echo pending  - CXR: Increased right basilar opacity/effusion, Stable left perihilar opacity. No pneumothorax  - c/w lasix 60mg IVP BID  - cardio consulted - Dr. Valdez: needs muga scan and possible cath monday  - cont metoprolol tartrate 25 bid, not on acei/arb likely due to worsening renal function   - monitor Is and Os, daily weights     #CKD5 s/p 1st session of HD (4/7)  #Elevated troponin likely 2/2 ckd   - unknown Cr, GFR baseline  - left arm precautions for AVF   - trop 0.05 -> 0.4  - avoid nephrotoxic agents   - nepro consulted - s/p first HD (4/7)    #HTN - controlled  - on amlodipine at home  - monitor off BP meds for now    #HLD  - Held fenofibrate due to CKD  - c/w lipitor 80mg daiy    #CAD  - c/w lipitor 80mg daiy  - c/w ASA 81mg daily  - c/w plavix 75mg daily  - c/w metoprolol tart 25mg BID    #CORAZON   - cont multivitamin     #Misc   Diet: DASH   Activity: IAT   GI ppx: PPI   DVT: ppx heparin   Code status: Full code    Dispo: Acute pending euvolemia, MUGA scan, possible cath on Monday

## 2022-04-08 NOTE — PROGRESS NOTE ADULT - ASSESSMENT
A 71 years old male presented to the ED with progressively worsening sob for the last one week. Also c/o LE swelling and orthopnea.     Hb: 10.4   BUN/Cr: 82/5.2  Pro BNP: 00008  CXR: B/L pleural effusion. Increase intersitial markings. (Check official report)  Trop: 0.05-->0.04      1. Acute HFmrEF  2. DM-2 / HTN / DL  3. CKD-5  4. Anemia of chronic disease  5. CAD / Abnormal stress test             PLAN:    . Cont tele  . Increase Lasix to 60 mg iv q 12h  . Check i's and o's and daily wt.   . Low salt diet and water restriction to 1.5 L/D  . ECHO  . Cardiology and nephrology eval  . On ASA, Plavix and Metoprolol  . Will need catheter for HD. Will d/w the cardiology about holding Plavix.   . Iron studies  . Monitor FS  . Cont Metoprolol and his other home meds.      A 71 years old male presented to the ED with progressively worsening sob for the last one week. Also c/o LE swelling and orthopnea.     Hb: 10.4   BUN/Cr: 82/5.2  Pro BNP: 99343  CXR: B/L pleural effusion. Increase intersitial markings. (Check official report)  Trop: 0.05-->0.04      1. Acute HFmrEF  2. DM-2 / HTN / DL  3. CKD-5. On HD now  4. Anemia of chronic disease  5. CAD / Abnormal stress test             PLAN:    ·	Cont tele  ·	Cont Lasix to 60 mg iv q 12h  ·	Check i's and o's and daily wt.   ·	Low salt diet and water restriction to 1.5 L/D  ·	ECHO  ·	Care d/w the cardiology. Will be scheduled for cath on Monday  ·	NPO post MN on Monday for cath on Monday  ·	Nephrology eval noted. Pt has mature AVF. S/P first HD yesterday another today   ·	Cont ASA, Plavix and Metoprolol  ·	Iron studies  ·	Monitor FS  ·	Cont Metoprolol and his other home meds.   ·	Venous doppler of the LE to R/O DVT    Progress Note Handoff    Pending (specify):  Consults_________, Tests_Venous doppler LE_, Test Results_______, Other__Cath on Monday_______  Family discussion:  Disposition: Home___/SNF___/Other________/Unknown at this time________    Sebastien Warren MD  Spectra: [id number]

## 2022-04-09 LAB
ALBUMIN SERPL ELPH-MCNC: 4 G/DL — SIGNIFICANT CHANGE UP (ref 3.5–5.2)
ALP SERPL-CCNC: 48 U/L — SIGNIFICANT CHANGE UP (ref 30–115)
ALT FLD-CCNC: 16 U/L — SIGNIFICANT CHANGE UP (ref 0–41)
ANION GAP SERPL CALC-SCNC: 14 MMOL/L — SIGNIFICANT CHANGE UP (ref 7–14)
AST SERPL-CCNC: 23 U/L — SIGNIFICANT CHANGE UP (ref 0–41)
BASOPHILS # BLD AUTO: 0.04 K/UL — SIGNIFICANT CHANGE UP (ref 0–0.2)
BASOPHILS NFR BLD AUTO: 0.7 % — SIGNIFICANT CHANGE UP (ref 0–1)
BILIRUB SERPL-MCNC: 0.5 MG/DL — SIGNIFICANT CHANGE UP (ref 0.2–1.2)
BUN SERPL-MCNC: 71 MG/DL — CRITICAL HIGH (ref 10–20)
CALCIUM SERPL-MCNC: 9.4 MG/DL — SIGNIFICANT CHANGE UP (ref 8.5–10.1)
CHLORIDE SERPL-SCNC: 101 MMOL/L — SIGNIFICANT CHANGE UP (ref 98–110)
CO2 SERPL-SCNC: 27 MMOL/L — SIGNIFICANT CHANGE UP (ref 17–32)
CREAT SERPL-MCNC: 4.1 MG/DL — CRITICAL HIGH (ref 0.7–1.5)
EGFR: 15 ML/MIN/1.73M2 — LOW
EOSINOPHIL # BLD AUTO: 0.16 K/UL — SIGNIFICANT CHANGE UP (ref 0–0.7)
EOSINOPHIL NFR BLD AUTO: 2.9 % — SIGNIFICANT CHANGE UP (ref 0–8)
FERRITIN SERPL-MCNC: 1351 NG/ML — HIGH (ref 30–400)
GLUCOSE BLDC GLUCOMTR-MCNC: 122 MG/DL — HIGH (ref 70–99)
GLUCOSE BLDC GLUCOMTR-MCNC: 144 MG/DL — HIGH (ref 70–99)
GLUCOSE BLDC GLUCOMTR-MCNC: 153 MG/DL — HIGH (ref 70–99)
GLUCOSE BLDC GLUCOMTR-MCNC: 170 MG/DL — HIGH (ref 70–99)
GLUCOSE SERPL-MCNC: 117 MG/DL — HIGH (ref 70–99)
HCT VFR BLD CALC: 33.8 % — LOW (ref 42–52)
HGB BLD-MCNC: 10.9 G/DL — LOW (ref 14–18)
IMM GRANULOCYTES NFR BLD AUTO: 0.2 % — SIGNIFICANT CHANGE UP (ref 0.1–0.3)
LYMPHOCYTES # BLD AUTO: 0.63 K/UL — LOW (ref 1.2–3.4)
LYMPHOCYTES # BLD AUTO: 11.3 % — LOW (ref 20.5–51.1)
MAGNESIUM SERPL-MCNC: 2.1 MG/DL — SIGNIFICANT CHANGE UP (ref 1.8–2.4)
MCHC RBC-ENTMCNC: 27 PG — SIGNIFICANT CHANGE UP (ref 27–31)
MCHC RBC-ENTMCNC: 32.2 G/DL — SIGNIFICANT CHANGE UP (ref 32–37)
MCV RBC AUTO: 83.9 FL — SIGNIFICANT CHANGE UP (ref 80–94)
MONOCYTES # BLD AUTO: 0.66 K/UL — HIGH (ref 0.1–0.6)
MONOCYTES NFR BLD AUTO: 11.8 % — HIGH (ref 1.7–9.3)
NEUTROPHILS # BLD AUTO: 4.09 K/UL — SIGNIFICANT CHANGE UP (ref 1.4–6.5)
NEUTROPHILS NFR BLD AUTO: 73.1 % — SIGNIFICANT CHANGE UP (ref 42.2–75.2)
NRBC # BLD: 0 /100 WBCS — SIGNIFICANT CHANGE UP (ref 0–0)
PLATELET # BLD AUTO: 156 K/UL — SIGNIFICANT CHANGE UP (ref 130–400)
POTASSIUM SERPL-MCNC: 4.5 MMOL/L — SIGNIFICANT CHANGE UP (ref 3.5–5)
POTASSIUM SERPL-SCNC: 4.5 MMOL/L — SIGNIFICANT CHANGE UP (ref 3.5–5)
PROT SERPL-MCNC: 6 G/DL — SIGNIFICANT CHANGE UP (ref 6–8)
RBC # BLD: 4.03 M/UL — LOW (ref 4.7–6.1)
RBC # FLD: 15.6 % — HIGH (ref 11.5–14.5)
SARS-COV-2 RNA SPEC QL NAA+PROBE: SIGNIFICANT CHANGE UP
SODIUM SERPL-SCNC: 142 MMOL/L — SIGNIFICANT CHANGE UP (ref 135–146)
WBC # BLD: 5.59 K/UL — SIGNIFICANT CHANGE UP (ref 4.8–10.8)
WBC # FLD AUTO: 5.59 K/UL — SIGNIFICANT CHANGE UP (ref 4.8–10.8)

## 2022-04-09 PROCEDURE — 99233 SBSQ HOSP IP/OBS HIGH 50: CPT

## 2022-04-09 RX ORDER — NIFEDIPINE 30 MG
30 TABLET, EXTENDED RELEASE 24 HR ORAL EVERY 24 HOURS
Refills: 0 | Status: DISCONTINUED | OUTPATIENT
Start: 2022-04-09 | End: 2022-04-14

## 2022-04-09 RX ADMIN — HEPARIN SODIUM 5000 UNIT(S): 5000 INJECTION INTRAVENOUS; SUBCUTANEOUS at 14:50

## 2022-04-09 RX ADMIN — Medication 30 MILLIGRAM(S): at 21:25

## 2022-04-09 RX ADMIN — Medication 25 MILLIGRAM(S): at 05:26

## 2022-04-09 RX ADMIN — HEPARIN SODIUM 5000 UNIT(S): 5000 INJECTION INTRAVENOUS; SUBCUTANEOUS at 21:25

## 2022-04-09 RX ADMIN — Medication 1 TABLET(S): at 11:33

## 2022-04-09 RX ADMIN — Medication 25 MILLIGRAM(S): at 17:21

## 2022-04-09 RX ADMIN — Medication 81 MILLIGRAM(S): at 11:32

## 2022-04-09 RX ADMIN — Medication 80 MILLIGRAM(S): at 17:20

## 2022-04-09 RX ADMIN — PANTOPRAZOLE SODIUM 40 MILLIGRAM(S): 20 TABLET, DELAYED RELEASE ORAL at 11:32

## 2022-04-09 RX ADMIN — ATORVASTATIN CALCIUM 80 MILLIGRAM(S): 80 TABLET, FILM COATED ORAL at 21:25

## 2022-04-09 RX ADMIN — Medication 80 MILLIGRAM(S): at 05:26

## 2022-04-09 RX ADMIN — CLOPIDOGREL BISULFATE 75 MILLIGRAM(S): 75 TABLET, FILM COATED ORAL at 11:32

## 2022-04-09 RX ADMIN — HEPARIN SODIUM 5000 UNIT(S): 5000 INJECTION INTRAVENOUS; SUBCUTANEOUS at 05:26

## 2022-04-09 RX ADMIN — LORATADINE 10 MILLIGRAM(S): 10 TABLET ORAL at 11:32

## 2022-04-09 NOTE — PROGRESS NOTE ADULT - SUBJECTIVE AND OBJECTIVE BOX
seen and examined  sob improving       PAST HISTORY  --------------------------------------------------------------------------------  No significant changes to PMH, PSH, FHx, SHx, unless otherwise noted    ALLERGIES & MEDICATIONS  --------------------------------------------------------------------------------  Allergies    sulfa drugs (Anaphylaxis; Swelling)    Intolerances      Standing Inpatient Medications  aspirin  chewable 81 milliGRAM(s) Oral daily  atorvastatin 80 milliGRAM(s) Oral at bedtime  clopidogrel Tablet 75 milliGRAM(s) Oral daily  furosemide   Injectable 80 milliGRAM(s) IV Push two times a day  heparin   Injectable 5000 Unit(s) SubCutaneous every 8 hours  loratadine 10 milliGRAM(s) Oral daily  metoprolol tartrate 25 milliGRAM(s) Oral two times a day  multivitamin 1 Tablet(s) Oral daily  pantoprazole    Tablet 40 milliGRAM(s) Oral before breakfast    PRN Inpatient Medications  acetaminophen     Tablet .. 650 milliGRAM(s) Oral every 6 hours PRN  melatonin 3 milliGRAM(s) Oral at bedtime PRN  nitroglycerin     SubLingual 0.4 milliGRAM(s) SubLingual every 5 minutes PRN          VITALS/PHYSICAL EXAM  --------------------------------------------------------------------------------  T(C): 35.6 (04-09-22 @ 04:47), Max: 36.2 (04-08-22 @ 20:06)  HR: 72 (04-09-22 @ 04:47) (72 - 84)  BP: 169/74 (04-09-22 @ 04:47) (131/81 - 169/74)  RR: 18 (04-09-22 @ 04:47) (18 - 18)  SpO2: 100% (04-08-22 @ 12:45) (95% - 100%)  Wt(kg): --        04-08-22 @ 07:01  -  04-09-22 @ 07:00  --------------------------------------------------------  IN: 1469 mL / OUT: 1450 mL / NET: 19 mL      Physical Exam:  	Gen: NAD  	Pulm: B/L sarachi at bases  	CV:  S1S2; no rub  	Abd:  soft, nontender/nondistended  	LE: edema  	Vascular access:edema     LABS/STUDIES  --------------------------------------------------------------------------------              10.9   5.59  >-----------<  156      [04-09-22 @ 04:30]              33.8     142  |  101  |  71  ----------------------------<  117      [04-09-22 @ 04:30]  4.5   |  27  |  4.1        Ca     9.4     [04-09-22 @ 04:30]      Mg     2.1     [04-09-22 @ 04:30]    TPro  6.0  /  Alb  4.0  /  TBili  0.5  /  DBili  x   /  AST  23  /  ALT  16  /  AlkPhos  48  [04-09-22 @ 04:30]      Creatinine Trend:  SCr 4.1 [04-09 @ 04:30]  SCr 4.1 [04-08 @ 20:00]  SCr 3.9 [04-08 @ 05:40]  SCr 4.1 [04-07 @ 20:00]  SCr 5.2 [04-07 @ 05:30]        Lipid: chol 101, TG 96, HDL 41, LDL --      [04-07-22 @ 05:30]    HBsAb <3.0      [04-07-22 @ 15:34]  HBsAb Nonreact      [04-07-22 @ 15:34]  HBsAg Nonreact      [04-07-22 @ 15:34]  HBcAb Nonreact      [04-07-22 @ 15:34]

## 2022-04-09 NOTE — PROGRESS NOTE ADULT - ASSESSMENT
DAWSON FALLON 71y Male  MRN#: [id number]   CODE STATUS: Full code       SUBJECTIVE  Patient is a 71y old Male who presents with a chief complaint of SOB (09 Apr 2022 09:10)  Currently admitted to medicine with the primary diagnosis of CHF exacerbation  Today is hospital day 3d, and this morning he is resting in bed and reports no overnight events.  significant improvement in SOB remains on O2.     OBJECTIVE  PAST MEDICAL & SURGICAL HISTORY  HTN (hypertension)    Kidney failure    Pneumonia, pneumococcal    Anemia    DM (diabetes mellitus)    Myocardial infarction    AV fistula    History of right common carotid artery stent placement    H/O colonoscopy  5-10 years ago      ALLERGIES:  sulfa drugs (Anaphylaxis; Swelling)    MEDICATIONS:  STANDING MEDICATIONS  aspirin  chewable 81 milliGRAM(s) Oral daily  atorvastatin 80 milliGRAM(s) Oral at bedtime  clopidogrel Tablet 75 milliGRAM(s) Oral daily  furosemide   Injectable 80 milliGRAM(s) IV Push two times a day  heparin   Injectable 5000 Unit(s) SubCutaneous every 8 hours  loratadine 10 milliGRAM(s) Oral daily  metoprolol tartrate 25 milliGRAM(s) Oral two times a day  multivitamin 1 Tablet(s) Oral daily  pantoprazole    Tablet 40 milliGRAM(s) Oral before breakfast    PRN MEDICATIONS  acetaminophen     Tablet .. 650 milliGRAM(s) Oral every 6 hours PRN  melatonin 3 milliGRAM(s) Oral at bedtime PRN  nitroglycerin     SubLingual 0.4 milliGRAM(s) SubLingual every 5 minutes PRN      VITAL SIGNS: Last 24 Hours  T(C): 35.6 (09 Apr 2022 04:47), Max: 36.2 (08 Apr 2022 20:06)  T(F): 96 (09 Apr 2022 04:47), Max: 97.2 (08 Apr 2022 20:06)  HR: 72 (09 Apr 2022 04:47) (72 - 84)  BP: 169/74 (09 Apr 2022 04:47) (131/81 - 169/74)  BP(mean): --  RR: 18 (09 Apr 2022 04:47) (18 - 18)  SpO2: 100% (08 Apr 2022 12:45) (95% - 100%)    LABS:                        10.9   5.59  )-----------( 156      ( 09 Apr 2022 04:30 )             33.8     04-09    142  |  101  |  71<HH>  ----------------------------<  117<H>  4.5   |  27  |  4.1<HH>    Ca    9.4      09 Apr 2022 04:30  Mg     2.1     04-09    TPro  6.0  /  Alb  4.0  /  TBili  0.5  /  DBili  x   /  AST  23  /  ALT  16  /  AlkPhos  48  04-09    RADIOLOGY:    < from: VA Duplex Lower Ext Vein Scan, Bilat (04.08.22 @ 18:05) >    No evidence of deep venous thrombosis or superficial thrombophlebitis in   the bilateral lower extremities.    < end of copied text >    PHYSICAL EXAM:    GENERAL:  NAD  SKIN: No rashes or lesions  HENT: Atraumatic. Normocephalic.  NECK: Supple, No JVD.   PULMONARY: Decreased BS in the base B/L.  CVS: Normal S1, S2. Rate and Rhythm are regular. No murmurs.  ABDOMEN/GI: Soft, Nontender, Nondistended;   EXTREMITIES: 1+ pitting edema B/L   NEUROLOGIC:  No motor or sensory deficit.  PSYCH: Alert & oriented x 3    ASSESSMENT & PLAN    A 71 years old male presented to the ED with progressively worsening sob for the last one week. Also c/o LE swelling and orthopnea.     1. Acute HFmrEF on IV lasix   2. DM-2 / HTN / DL  3. ESRD. On HD now. non oliguric   4. Anemia of chronic disease  5. CAD / Abnormal stress test             PLAN:    Cont tele  Cont Lasix to 60 mg iv q 12h  Check i's and o's and daily wt.   Low salt diet and water restriction to 1.5 L/D  ECHO  cath on Monday  NPO post MN on Monday for cath on Monday  Nephrology eval noted. Pt has mature AVF. S/P HD yesterday, AVF infiltrated yesterday  next HD monday if cant use AVF will need TDC   Cont ASA, Plavix and Metoprolol  Iron studies  Monitor FS  Cont Metoprolol and his other home meds.   Venous doppler of the LE to R/O DVT      Progress Note Handoff  Pending (specify):Cath on [place]  Disposition: Unknown at this time________

## 2022-04-09 NOTE — PROGRESS NOTE ADULT - SUBJECTIVE AND OBJECTIVE BOX
SUBJ:No chest pain or shortness of breath      MEDICATIONS  (STANDING):  aspirin  chewable 81 milliGRAM(s) Oral daily  atorvastatin 80 milliGRAM(s) Oral at bedtime  clopidogrel Tablet 75 milliGRAM(s) Oral daily  furosemide   Injectable 80 milliGRAM(s) IV Push two times a day  heparin   Injectable 5000 Unit(s) SubCutaneous every 8 hours  loratadine 10 milliGRAM(s) Oral daily  metoprolol tartrate 25 milliGRAM(s) Oral two times a day  multivitamin 1 Tablet(s) Oral daily  pantoprazole    Tablet 40 milliGRAM(s) Oral before breakfast    MEDICATIONS  (PRN):  acetaminophen     Tablet .. 650 milliGRAM(s) Oral every 6 hours PRN Temp greater or equal to 38C (100.4F), Mild Pain (1 - 3)  melatonin 3 milliGRAM(s) Oral at bedtime PRN Insomnia  nitroglycerin     SubLingual 0.4 milliGRAM(s) SubLingual every 5 minutes PRN Chest Pain            Vital Signs Last 24 Hrs  T(C): 35.1 (09 Apr 2022 12:30), Max: 36.2 (08 Apr 2022 20:06)  T(F): 95.1 (09 Apr 2022 12:30), Max: 97.2 (08 Apr 2022 20:06)  HR: 74 (09 Apr 2022 12:30) (72 - 84)  BP: 155/68 (09 Apr 2022 12:30) (155/68 - 169/74)  BP(mean): --  RR: 18 (09 Apr 2022 12:30) (18 - 18)  SpO2: --      ECG:NML    TTE:    LABS:                        10.9   5.59  )-----------( 156      ( 09 Apr 2022 04:30 )             33.8     04-09    142  |  101  |  71<HH>  ----------------------------<  117<H>  4.5   |  27  |  4.1<HH>    Ca    9.4      09 Apr 2022 04:30  Mg     2.1     04-09    TPro  6.0  /  Alb  4.0  /  TBili  0.5  /  DBili  x   /  AST  23  /  ALT  16  /  AlkPhos  48  04-09            I&O's Summary    08 Apr 2022 07:01  -  09 Apr 2022 07:00  --------------------------------------------------------  IN: 1469 mL / OUT: 1450 mL / NET: 19 mL    09 Apr 2022 07:01  -  09 Apr 2022 14:28  --------------------------------------------------------  IN: 806 mL / OUT: 300 mL / NET: 506 mL      BNP

## 2022-04-09 NOTE — PROGRESS NOTE ADULT - ASSESSMENT
Pt with CKD stage 5, DM for 15 years (was on Ozempic), HTN, CAD, HFrEF, admitted with fluid overload.  # CKD 5 -  started on  HD for fluid overload, advanced CKD  AVF infiltrated yesterday   s/p hd yesterday   will rest fistula  no hd today   cont IV Lasix 80 q12/ non oliguric / add metolazone 5   will do hd monday if unable to use fistula will need TDC   cardiology notes appreciated   Etiology of CKD  - likely diabetic nephropathy  -pt  had a nephrotic range proteinuria 5 g/g with negative w/u for myeloma, vasculitis APL2R Ab  -check phos, iPTH  - had a Kidney Tx eval at [facility]  - check ph level   #HFrEF - please consult cardiology for a cath ([facility])  had a recent stress test  - asymmetric edema on LE L>R,  obtain LE Dupplex, no DVT  WILL FOLLOW

## 2022-04-10 LAB
ALBUMIN SERPL ELPH-MCNC: 3.4 G/DL — LOW (ref 3.5–5.2)
ALP SERPL-CCNC: 41 U/L — SIGNIFICANT CHANGE UP (ref 30–115)
ALT FLD-CCNC: 14 U/L — SIGNIFICANT CHANGE UP (ref 0–41)
ANION GAP SERPL CALC-SCNC: 12 MMOL/L — SIGNIFICANT CHANGE UP (ref 7–14)
ANION GAP SERPL CALC-SCNC: 13 MMOL/L — SIGNIFICANT CHANGE UP (ref 7–14)
AST SERPL-CCNC: 19 U/L — SIGNIFICANT CHANGE UP (ref 0–41)
BASOPHILS # BLD AUTO: 0.06 K/UL — SIGNIFICANT CHANGE UP (ref 0–0.2)
BASOPHILS NFR BLD AUTO: 0.8 % — SIGNIFICANT CHANGE UP (ref 0–1)
BILIRUB SERPL-MCNC: 0.3 MG/DL — SIGNIFICANT CHANGE UP (ref 0.2–1.2)
BUN SERPL-MCNC: 76 MG/DL — CRITICAL HIGH (ref 10–20)
BUN SERPL-MCNC: 79 MG/DL — CRITICAL HIGH (ref 10–20)
CALCIUM SERPL-MCNC: 8.8 MG/DL — SIGNIFICANT CHANGE UP (ref 8.5–10.1)
CALCIUM SERPL-MCNC: 9.5 MG/DL — SIGNIFICANT CHANGE UP (ref 8.5–10.1)
CHLORIDE SERPL-SCNC: 100 MMOL/L — SIGNIFICANT CHANGE UP (ref 98–110)
CHLORIDE SERPL-SCNC: 99 MMOL/L — SIGNIFICANT CHANGE UP (ref 98–110)
CO2 SERPL-SCNC: 27 MMOL/L — SIGNIFICANT CHANGE UP (ref 17–32)
CO2 SERPL-SCNC: 27 MMOL/L — SIGNIFICANT CHANGE UP (ref 17–32)
CREAT SERPL-MCNC: 4.6 MG/DL — CRITICAL HIGH (ref 0.7–1.5)
CREAT SERPL-MCNC: 4.7 MG/DL — CRITICAL HIGH (ref 0.7–1.5)
EGFR: 13 ML/MIN/1.73M2 — LOW
EGFR: 13 ML/MIN/1.73M2 — LOW
EOSINOPHIL # BLD AUTO: 0.39 K/UL — SIGNIFICANT CHANGE UP (ref 0–0.7)
EOSINOPHIL NFR BLD AUTO: 5.4 % — SIGNIFICANT CHANGE UP (ref 0–8)
GLUCOSE BLDC GLUCOMTR-MCNC: 110 MG/DL — HIGH (ref 70–99)
GLUCOSE BLDC GLUCOMTR-MCNC: 112 MG/DL — HIGH (ref 70–99)
GLUCOSE BLDC GLUCOMTR-MCNC: 139 MG/DL — HIGH (ref 70–99)
GLUCOSE BLDC GLUCOMTR-MCNC: 168 MG/DL — HIGH (ref 70–99)
GLUCOSE SERPL-MCNC: 118 MG/DL — HIGH (ref 70–99)
GLUCOSE SERPL-MCNC: 131 MG/DL — HIGH (ref 70–99)
HCT VFR BLD CALC: 29.5 % — LOW (ref 42–52)
HGB BLD-MCNC: 9.5 G/DL — LOW (ref 14–18)
IMM GRANULOCYTES NFR BLD AUTO: 0.3 % — SIGNIFICANT CHANGE UP (ref 0.1–0.3)
LYMPHOCYTES # BLD AUTO: 0.86 K/UL — LOW (ref 1.2–3.4)
LYMPHOCYTES # BLD AUTO: 11.9 % — LOW (ref 20.5–51.1)
MAGNESIUM SERPL-MCNC: 2.1 MG/DL — SIGNIFICANT CHANGE UP (ref 1.8–2.4)
MAGNESIUM SERPL-MCNC: 2.1 MG/DL — SIGNIFICANT CHANGE UP (ref 1.8–2.4)
MCHC RBC-ENTMCNC: 27.1 PG — SIGNIFICANT CHANGE UP (ref 27–31)
MCHC RBC-ENTMCNC: 32.2 G/DL — SIGNIFICANT CHANGE UP (ref 32–37)
MCV RBC AUTO: 84 FL — SIGNIFICANT CHANGE UP (ref 80–94)
MONOCYTES # BLD AUTO: 0.94 K/UL — HIGH (ref 0.1–0.6)
MONOCYTES NFR BLD AUTO: 13 % — HIGH (ref 1.7–9.3)
NEUTROPHILS # BLD AUTO: 4.95 K/UL — SIGNIFICANT CHANGE UP (ref 1.4–6.5)
NEUTROPHILS NFR BLD AUTO: 68.6 % — SIGNIFICANT CHANGE UP (ref 42.2–75.2)
NRBC # BLD: 0 /100 WBCS — SIGNIFICANT CHANGE UP (ref 0–0)
PLATELET # BLD AUTO: 147 K/UL — SIGNIFICANT CHANGE UP (ref 130–400)
POTASSIUM SERPL-MCNC: 4.3 MMOL/L — SIGNIFICANT CHANGE UP (ref 3.5–5)
POTASSIUM SERPL-MCNC: 4.6 MMOL/L — SIGNIFICANT CHANGE UP (ref 3.5–5)
POTASSIUM SERPL-SCNC: 4.3 MMOL/L — SIGNIFICANT CHANGE UP (ref 3.5–5)
POTASSIUM SERPL-SCNC: 4.6 MMOL/L — SIGNIFICANT CHANGE UP (ref 3.5–5)
PROT SERPL-MCNC: 5.4 G/DL — LOW (ref 6–8)
RBC # BLD: 3.51 M/UL — LOW (ref 4.7–6.1)
RBC # FLD: 15.7 % — HIGH (ref 11.5–14.5)
SODIUM SERPL-SCNC: 139 MMOL/L — SIGNIFICANT CHANGE UP (ref 135–146)
SODIUM SERPL-SCNC: 139 MMOL/L — SIGNIFICANT CHANGE UP (ref 135–146)
WBC # BLD: 7.22 K/UL — SIGNIFICANT CHANGE UP (ref 4.8–10.8)
WBC # FLD AUTO: 7.22 K/UL — SIGNIFICANT CHANGE UP (ref 4.8–10.8)

## 2022-04-10 PROCEDURE — 99233 SBSQ HOSP IP/OBS HIGH 50: CPT

## 2022-04-10 RX ADMIN — HEPARIN SODIUM 5000 UNIT(S): 5000 INJECTION INTRAVENOUS; SUBCUTANEOUS at 05:02

## 2022-04-10 RX ADMIN — Medication 80 MILLIGRAM(S): at 05:02

## 2022-04-10 RX ADMIN — Medication 25 MILLIGRAM(S): at 05:02

## 2022-04-10 RX ADMIN — ATORVASTATIN CALCIUM 80 MILLIGRAM(S): 80 TABLET, FILM COATED ORAL at 21:08

## 2022-04-10 RX ADMIN — CLOPIDOGREL BISULFATE 75 MILLIGRAM(S): 75 TABLET, FILM COATED ORAL at 11:02

## 2022-04-10 RX ADMIN — HEPARIN SODIUM 5000 UNIT(S): 5000 INJECTION INTRAVENOUS; SUBCUTANEOUS at 21:08

## 2022-04-10 RX ADMIN — Medication 25 MILLIGRAM(S): at 17:08

## 2022-04-10 RX ADMIN — PANTOPRAZOLE SODIUM 40 MILLIGRAM(S): 20 TABLET, DELAYED RELEASE ORAL at 05:02

## 2022-04-10 RX ADMIN — Medication 81 MILLIGRAM(S): at 11:02

## 2022-04-10 RX ADMIN — LORATADINE 10 MILLIGRAM(S): 10 TABLET ORAL at 11:01

## 2022-04-10 RX ADMIN — Medication 80 MILLIGRAM(S): at 17:08

## 2022-04-10 RX ADMIN — Medication 30 MILLIGRAM(S): at 20:16

## 2022-04-10 RX ADMIN — Medication 1 TABLET(S): at 11:01

## 2022-04-10 RX ADMIN — HEPARIN SODIUM 5000 UNIT(S): 5000 INJECTION INTRAVENOUS; SUBCUTANEOUS at 13:09

## 2022-04-10 NOTE — PROGRESS NOTE ADULT - SUBJECTIVE AND OBJECTIVE BOX
SUBJ:No chest pain or shortness of breath      MEDICATIONS  (STANDING):  aspirin  chewable 81 milliGRAM(s) Oral daily  atorvastatin 80 milliGRAM(s) Oral at bedtime  clopidogrel Tablet 75 milliGRAM(s) Oral daily  furosemide   Injectable 80 milliGRAM(s) IV Push two times a day  heparin   Injectable 5000 Unit(s) SubCutaneous every 8 hours  loratadine 10 milliGRAM(s) Oral daily  metoprolol tartrate 25 milliGRAM(s) Oral two times a day  multivitamin 1 Tablet(s) Oral daily  NIFEdipine XL 30 milliGRAM(s) Oral every 24 hours  pantoprazole    Tablet 40 milliGRAM(s) Oral before breakfast    MEDICATIONS  (PRN):  acetaminophen     Tablet .. 650 milliGRAM(s) Oral every 6 hours PRN Temp greater or equal to 38C (100.4F), Mild Pain (1 - 3)  melatonin 3 milliGRAM(s) Oral at bedtime PRN Insomnia  nitroglycerin     SubLingual 0.4 milliGRAM(s) SubLingual every 5 minutes PRN Chest Pain            Vital Signs Last 24 Hrs  T(C): 36.1 (10 Apr 2022 04:21), Max: 36.1 (10 Apr 2022 04:21)  T(F): 97 (10 Apr 2022 04:21), Max: 97 (10 Apr 2022 04:21)  HR: 75 (10 Apr 2022 04:21) (74 - 83)  BP: 124/64 (10 Apr 2022 04:21) (124/64 - 191/81)  BP(mean): --  RR: 18 (10 Apr 2022 04:21) (18 - 20)  SpO2: --      ECG:NML    TTE:    LABS:                        9.5    7.22  )-----------( 147      ( 10 Apr 2022 07:30 )             29.5     04-10    139  |  100  |  76<HH>  ----------------------------<  118<H>  4.3   |  27  |  4.6<HH>    Ca    8.8      10 Apr 2022 07:30  Mg     2.1     04-10    TPro  5.4<L>  /  Alb  3.4<L>  /  TBili  0.3  /  DBili  x   /  AST  19  /  ALT  14  /  AlkPhos  41  04-10            I&O's Summary    09 Apr 2022 07:01  -  10 Apr 2022 07:00  --------------------------------------------------------  IN: 1046 mL / OUT: 3625 mL / NET: -2579 mL    10 Apr 2022 07:01  -  10 Apr 2022 11:53  --------------------------------------------------------  IN: 336 mL / OUT: 450 mL / NET: -114 mL      BNP

## 2022-04-10 NOTE — PROGRESS NOTE ADULT - ASSESSMENT
DAWSON FALLON 71y Male  MRN#: [id number]   CODE STATUS:full code       SUBJECTIVE  Patient is a 71y old Male who presents with a chief complaint of SOB (10 Apr 2022 09:02)  Currently admitted to medicine with the primary diagnosis of CHF exacerbation  Today is hospital day 4d, and this morning he is resting in bed and reports no overnight events. resports significant improvement in SOB. walks to bathroom with having any difficulties with breathing. overall all feels better no chest pain     OBJECTIVE  PAST MEDICAL & SURGICAL HISTORY  HTN (hypertension)    Kidney failure    Pneumonia, pneumococcal    Anemia    DM (diabetes mellitus)    Myocardial infarction    AV fistula    History of right common carotid artery stent placement    H/O colonoscopy  5-10 years ago      ALLERGIES:  sulfa drugs (Anaphylaxis; Swelling)    MEDICATIONS:  STANDING MEDICATIONS  aspirin  chewable 81 milliGRAM(s) Oral daily  atorvastatin 80 milliGRAM(s) Oral at bedtime  clopidogrel Tablet 75 milliGRAM(s) Oral daily  furosemide   Injectable 80 milliGRAM(s) IV Push two times a day  heparin   Injectable 5000 Unit(s) SubCutaneous every 8 hours  loratadine 10 milliGRAM(s) Oral daily  metoprolol tartrate 25 milliGRAM(s) Oral two times a day  multivitamin 1 Tablet(s) Oral daily  NIFEdipine XL 30 milliGRAM(s) Oral every 24 hours  pantoprazole    Tablet 40 milliGRAM(s) Oral before breakfast    PRN MEDICATIONS  acetaminophen     Tablet .. 650 milliGRAM(s) Oral every 6 hours PRN  melatonin 3 milliGRAM(s) Oral at bedtime PRN  nitroglycerin     SubLingual 0.4 milliGRAM(s) SubLingual every 5 minutes PRN      VITAL SIGNS: Last 24 Hours  T(C): 36.1 (10 Apr 2022 04:21), Max: 36.1 (10 Apr 2022 04:21)  T(F): 97 (10 Apr 2022 04:21), Max: 97 (10 Apr 2022 04:21)  HR: 75 (10 Apr 2022 04:21) (74 - 83)  BP: 124/64 (10 Apr 2022 04:21) (124/64 - 191/81)  BP(mean): --  RR: 18 (10 Apr 2022 04:21) (18 - 20)  SpO2: --    LABS:                        9.5    7.22  )-----------( 147      ( 10 Apr 2022 07:30 )             29.5     04-10    139  |  100  |  76<HH>  ----------------------------<  118<H>  4.3   |  27  |  4.6<HH>    Ca    8.8      10 Apr 2022 07:30  Mg     2.1     04-10    TPro  5.4<L>  /  Alb  3.4<L>  /  TBili  0.3  /  DBili  x   /  AST  19  /  ALT  14  /  AlkPhos  41  04-10    RADIOLOGY:  < from: VA Duplex Lower Ext Vein Scan, Bilat (04.08.22 @ 18:05) >    No evidence of deep venous thrombosis or superficial thrombophlebitis in   the bilateral lower extremities.    < end of copied text >  < from: TTE Echo Complete w/o Contrast w/ Doppler (04.08.22 @ 15:54) >   1. Left ventricular ejection fraction, by visual estimation, is 45 to   50%.   2. Mildly decreased global left ventricular systolic function.   3. Global cardiomyopathy.   4. Moderate to severely increased left ventricular internal cavity size.   5. Moderately increased LV wall thickness.   6. Mildly enlarged right atrium.   7. Moderately enlarged left atrium.   8. Mild thickening of the anterior and posterior mitral valve leaflets.   9. Moderate mitral valve regurgitation.  10. Mild tricuspid regurgitation.  11. Aortic valve is bicuspid.  12. Sclerotic aortic valve with normal opening.  13. Small patent foramen ovale, with predominantly right to left shunting   across the atrial septum.    < end of copied text >      PHYSICAL EXAM:  GENERAL:  NAD  SKIN: No rashes or lesions  HENT: Atraumatic. Normocephalic.  NECK: Supple, No JVD.   PULMONARY: Decreased BS in the base B/L.  CVS: Normal S1, S2. Rate and Rhythm are regular. No murmurs.  ABDOMEN/GI: Soft, Nontender, Nondistended;   EXTREMITIES: 1+ pitting edema B/L   NEUROLOGIC:  No motor or sensory deficit.  PSYCH: Alert & oriented x 3    ASSESSMENT & PLAN    A 71 years old male presented to the ED with progressively worsening sob for the last one week. Also c/o LE swelling and orthopnea.     1. Acute HFmrEF on IV lasix, improving  2. DM-2 / HTN / DL  3. ESRD. On HD now. non oliguric   4. Anemia of chronic disease  5. CAD / Abnormal stress test             PLAN:    Cont tele  Cont Lasix to 60 mg iv q 12h, - 2.2 Ls  O2 requirement decreasing, continue to wean off oxygen   Check i's and o's and daily wt.   Low salt diet and water restriction to 1.5 L/D  ECHO noted EF 45-55%  NPO post MN on Monday for cath on Monday  Nephrology eval noted. Pt has mature AVF. S/P HD Friday; AVF infiltrated. next HD monday if cant use AVF will need TDC   Cont ASA, Plavix and Metoprolol  Iron studies  Monitor FS  Cont Metoprolol and his other home meds.   Venous doppler of the LE to R/O DVT neg       Progress Note Handoff  Pending (specify):Cath and HD on Monday, may need HD access if cant use AVF.  Disposition: Unknown at this time

## 2022-04-10 NOTE — PROGRESS NOTE ADULT - SUBJECTIVE AND OBJECTIVE BOX
Nephrology Progress Note    DAWSON FALLON  MRN-[id number]  71y  Male    S:  Patient is seen and examined, events over the last 24h noted.    O:  Allergies:  sulfa drugs (Anaphylaxis; Swelling)    Hospital Medications:   MEDICATIONS  (STANDING):  aspirin  chewable 81 milliGRAM(s) Oral daily  atorvastatin 80 milliGRAM(s) Oral at bedtime  clopidogrel Tablet 75 milliGRAM(s) Oral daily  furosemide   Injectable 80 milliGRAM(s) IV Push two times a day  heparin   Injectable 5000 Unit(s) SubCutaneous every 8 hours  loratadine 10 milliGRAM(s) Oral daily  metoprolol tartrate 25 milliGRAM(s) Oral two times a day  multivitamin 1 Tablet(s) Oral daily  NIFEdipine XL 30 milliGRAM(s) Oral every 24 hours  pantoprazole    Tablet 40 milliGRAM(s) Oral before breakfast    MEDICATIONS  (PRN):  acetaminophen     Tablet .. 650 milliGRAM(s) Oral every 6 hours PRN Temp greater or equal to 38C (100.4F), Mild Pain (1 - 3)  melatonin 3 milliGRAM(s) Oral at bedtime PRN Insomnia  nitroglycerin     SubLingual 0.4 milliGRAM(s) SubLingual every 5 minutes PRN Chest Pain    Home Medications:  alfuzosin 10 mg oral tablet, extended release: 1 tab(s) orally once a day (2022 21:53)  Aranesp 500 mcg/mL injectable solution: every 3 weeks (2022 21:53)  aspirin 81 mg oral delayed release capsule: 1 tab(s) orally once a day (2022 21:53)  Claritin 10 mg oral tablet: 1 tab(s) orally once a day (2022 21:53)  clopidogrel 75 mg oral tablet: 1 tab(s) orally once a day (2022 21:53)  ezetimibe 10 mg oral tablet: 1 tab(s) orally once a day (2022 21:53)  fenofibrate 160 mg oral tablet: 1 tab(s) orally once a day (2022 21:53)  furosemide 20 mg oral tablet: 1 tab(s) orally once a day (2022 21:53)  Metoprolol Tartrate 25 mg oral tablet: 1 tab(s) orally 2 times a day (2022 21:53)  Multiple Vitamins oral tablet: 1 tab(s) orally once a day (2022 21:53)  NIFEdipine 30 mg oral tablet, extended release: 1 tab(s) orally once a day (:53)  Nitrostat 0.4 mg sublingual tablet: 1 tab(s) sublingual every 5 minutes, As Needed (2022 21:53)  Ozempic (1 mg dose) 4 mg/3 mL subcutaneous solution:  (2022 21:53)  Pepcid 40 mg oral tablet: 1 tab(s) orally once a day (at bedtime) (2022 21:53)  rosuvastatin 20 mg oral tablet: 1 tab(s) orally once a day (2022 21:53)      VITALS:  Daily Weight in k.8 (10 Apr 2022 04:21)  T(F): 96.7 (04-10-22 @ 12:30), Max: 97 (04-10-22 @ 04:21)  HR: 83 (04-10-22 @ 12:30)  BP: 165/74 (04-10-22 @ 12:30)  RR: 18 (04-10-22 @ 12:30)  SpO2: --  Wt(kg): --  I&O's Detail    2022 07:  -  10 Apr 2022 07:00  --------------------------------------------------------  IN:    Oral Fluid: 1046 mL  Total IN: 1046 mL    OUT:    Voided (mL): 3625 mL  Total OUT: 3625 mL    Total NET: -2579 mL      10 Apr 2022 07:  -  10 Apr 2022 17:19  --------------------------------------------------------  IN:    Oral Fluid: 656 mL  Total IN: 656 mL    OUT:    Voided (mL): 1400 mL  Total OUT: 1400 mL    Total NET: -744 mL        I&O's Summary    2022 07:01  -  10 Apr 2022 07:00  --------------------------------------------------------  IN: 1046 mL / OUT: 3625 mL / NET: -2579 mL    10 Apr 2022 07:01  -  10 Apr 2022 17:19  --------------------------------------------------------  IN: 656 mL / OUT: 1400 mL / NET: -744 mL          PHYSICAL EXAM:  Gen: NAD  Resp: decreased bs at the bases  Card: S1/S2  Abd: soft  Extremities: +edema  Vascular access: AVF      LABS:      04-10    139  |  100  |  76<HH>  ----------------------------<  118<H>  4.3   |  27  |  4.6<HH>    Ca    8.8      10 Apr 2022 07:30  Mg     2.1     04-10    TPro  5.4<L>  /  Alb  3.4<L>  /  TBili  0.3  /  DBili      /  AST  19  /  ALT  14  /  AlkPhos  41  04-10                            9.5    7.22  )-----------( 147      ( 10 Apr 2022 07:30 )             29.5     Mean Cell Volume: 84.0 fL (04-10-22 @ 07:30)    Ferritin, Serum: 1351 ng/mL (22 @ 04:30)          Creatinine trend:  Creatinine, Serum: 4.6 mg/dL (04-10-22 @ 07:30)  Creatinine, Serum: 4.1 mg/dL (22 @ 04:30)  Creatinine, Serum: 4.1 mg/dL (22 @ 20:00)  Creatinine, Serum: 3.9 mg/dL (22 @ 05:40)  Creatinine, Serum: 4.1 mg/dL (22 @ 20:00)  Creatinine, Serum: 5.2 mg/dL (22 @ 05:30)  Creatinine, Serum: 4.6 mg/dL (22 @ 15:50)    Hepatitis B Surface Antigen: Nonreact (22 @ 15:34)  Hepatitis B Surface Antibody: Nonreact (22 @ 15:34)  Hepatitis B Core Antibody, Total: Nonreact (22 @ 15:34)

## 2022-04-10 NOTE — PROGRESS NOTE ADULT - SUBJECTIVE AND OBJECTIVE BOX
DAWSON FALLON 71y Male  MRN#: [id number]   Hospital Day: 4d      SUBJECTIVE  Patient is a 71y old Male with PMH HTN, HLD, MI, anemia , CKD5 presents to ED complaining of shortness of breath which started 3 weeks ago and has been getting progressively worse.    Currently admitted to medicine with the primary diagnosis of CHF exacerbation        INTERVAL HPI AND OVERNIGHT EVENTS:  Patient was examined and seen at bedside. This morning he is resting comfortably in bed and reports no issues or overnight events.    REVIEW OF SYMPTOMS:  Denies any CP, SOB, abd pain. No nausea, sweats or no chills.     OBJECTIVE  PAST MEDICAL & SURGICAL HISTORY  HTN (hypertension)    Kidney failure    Pneumonia, pneumococcal    Anemia    DM (diabetes mellitus)    Myocardial infarction    AV fistula    History of right common carotid artery stent placement    H/O colonoscopy  5-10 years ago      ALLERGIES:  sulfa drugs (Anaphylaxis; Swelling)    MEDICATIONS:  STANDING MEDICATIONS  aspirin  chewable 81 milliGRAM(s) Oral daily  atorvastatin 80 milliGRAM(s) Oral at bedtime  clopidogrel Tablet 75 milliGRAM(s) Oral daily  furosemide   Injectable 80 milliGRAM(s) IV Push two times a day  heparin   Injectable 5000 Unit(s) SubCutaneous every 8 hours  loratadine 10 milliGRAM(s) Oral daily  metoprolol tartrate 25 milliGRAM(s) Oral two times a day  multivitamin 1 Tablet(s) Oral daily  NIFEdipine XL 30 milliGRAM(s) Oral every 24 hours  pantoprazole    Tablet 40 milliGRAM(s) Oral before breakfast    PRN MEDICATIONS  acetaminophen     Tablet .. 650 milliGRAM(s) Oral every 6 hours PRN  melatonin 3 milliGRAM(s) Oral at bedtime PRN  nitroglycerin     SubLingual 0.4 milliGRAM(s) SubLingual every 5 minutes PRN      VITAL SIGNS: Last 24 Hours  T(C): 36.1 (10 Apr 2022 04:21), Max: 36.1 (10 Apr 2022 04:21)  T(F): 97 (10 Apr 2022 04:21), Max: 97 (10 Apr 2022 04:21)  HR: 75 (10 Apr 2022 04:21) (74 - 83)  BP: 124/64 (10 Apr 2022 04:21) (124/64 - 191/81)  BP(mean): --  RR: 18 (10 Apr 2022 04:21) (18 - 20)  SpO2: --    LABS:                        9.5    7.22  )-----------( 147      ( 10 Apr 2022 07:30 )             29.5     04-10    139  |  100  |  76<HH>  ----------------------------<  118<H>  4.3   |  27  |  4.6<HH>    Ca    8.8      10 Apr 2022 07:30  Mg     2.1     04-10    TPro  5.4<L>  /  Alb  3.4<L>  /  TBili  0.3  /  DBili  x   /  AST  19  /  ALT  14  /  AlkPhos  41  04-10        RADIOLOGY:        PHYSICAL EXAM:  CONSTITUTIONAL: No acute distress, AAOx3  HEAD: Atraumatic, normocephalic  EYES: conjunctiva and sclera clear  ENT: No JVD  PULMONARY: cleared BL to auscultation  CARDIOVASCULAR: Regular rate and rhythm; no murmurs  GASTROINTESTINAL: Soft, non-tender, non-distended; bowel sounds present  MUSCULOSKELETAL: 2+ peripheral pulses; +1 edema  NEUROLOGY: non-focal  SKIN: No rashes or lesions; warm and dry

## 2022-04-10 NOTE — PROGRESS NOTE ADULT - ASSESSMENT
SUBJ:No chest pain or shortness of breath      MEDICATIONS  (STANDING):  aspirin  chewable 81 milliGRAM(s) Oral daily  atorvastatin 80 milliGRAM(s) Oral at bedtime  clopidogrel Tablet 75 milliGRAM(s) Oral daily  furosemide   Injectable 80 milliGRAM(s) IV Push two times a day  heparin   Injectable 5000 Unit(s) SubCutaneous every 8 hours  loratadine 10 milliGRAM(s) Oral daily  metoprolol tartrate 25 milliGRAM(s) Oral two times a day  multivitamin 1 Tablet(s) Oral daily  NIFEdipine XL 30 milliGRAM(s) Oral every 24 hours  pantoprazole    Tablet 40 milliGRAM(s) Oral before breakfast    MEDICATIONS  (PRN):  acetaminophen     Tablet .. 650 milliGRAM(s) Oral every 6 hours PRN Temp greater or equal to 38C (100.4F), Mild Pain (1 - 3)  melatonin 3 milliGRAM(s) Oral at bedtime PRN Insomnia  nitroglycerin     SubLingual 0.4 milliGRAM(s) SubLingual every 5 minutes PRN Chest Pain            Vital Signs Last 24 Hrs  T(C): 36.1 (10 Apr 2022 04:21), Max: 36.1 (10 Apr 2022 04:21)  T(F): 97 (10 Apr 2022 04:21), Max: 97 (10 Apr 2022 04:21)  HR: 75 (10 Apr 2022 04:21) (74 - 83)  BP: 124/64 (10 Apr 2022 04:21) (124/64 - 191/81)  BP(mean): --  RR: 18 (10 Apr 2022 04:21) (18 - 20)  SpO2: --      ECG:NML    TTE:    LABS:                        9.5    7.22  )-----------( 147      ( 10 Apr 2022 07:30 )             29.5     04-10    139  |  100  |  76<HH>  ----------------------------<  118<H>  4.3   |  27  |  4.6<HH>    Ca    8.8      10 Apr 2022 07:30  Mg     2.1     04-10    TPro  5.4<L>  /  Alb  3.4<L>  /  TBili  0.3  /  DBili  x   /  AST  19  /  ALT  14  /  AlkPhos  41  04-10            I&O's Summary    09 Apr 2022 07:01  -  10 Apr 2022 07:00  --------------------------------------------------------  IN: 1046 mL / OUT: 3625 mL / NET: -2579 mL    10 Apr 2022 07:01  -  10 Apr 2022 11:54  --------------------------------------------------------  IN: 336 mL / OUT: 450 mL / NET: -114 mL      BNP          pt for cath tomorrow. echo noted.

## 2022-04-10 NOTE — PROGRESS NOTE ADULT - ASSESSMENT
Patient is a 71y old Male with PMH HTN, HLD, MI, anemia , CKD5 presents to ED complaining of shortness of breath which started 3 weeks ago and has been getting progressively worse.    Currently admitted to medicine with the primary diagnosis of CHF exacerbation    #SOB most likely secondary to Acute HFrEF   - On admision BNP 23K  - echo 3/29/22 at [facility] showed EF 25-30%, G2DD, mild MR   - repeat echo: EF 45-50%, Bicuspid aortic valve, small patent foramen ovale   - CXR: Increased right basilar opacity/effusion, Stable left perihilar opacity. No pneumothorax  - c/w lasix 80mg IVP BID  - will attempt HD tmmr  - cardio consulted - Dr. Valdez: needs muga scan and possible cath monday  - cont metoprolol tartrate 25 bid, not on acei/arb likely due to worsening renal function   - monitor Is and Os, daily weights     #CKD5 s/p 1st session of HD (4/7)  #Elevated troponin likely 2/2 ckd   - unknown Cr, GFR baseline  - left arm precautions for AVF   - trop 0.05 -> 0.4  - avoid nephrotoxic agents   - nepro consulted - s/p first HD (4/7)  - AVF infiltrated 4/8  - scheduled for attempted HD tmmr  - if HD fails, then TDC    #HTN - controlled  - on amlodipine at home  - monitor off BP meds for now    #HLD  - Held fenofibrate due to CKD  - c/w lipitor 80mg daiy    #CAD  - c/w lipitor 80mg daiy  - c/w ASA 81mg daily  - c/w plavix 75mg daily  - c/w metoprolol tart 25mg BID    #CORAZON   - cont multivitamin     #Misc   Diet: DASH/CC   Activity: IAT   GI ppx: PPI   DVT: ppx heparin   Code status: Full code    Dispo: Acute pending euvolemia, MUGA scan, possible cath on Monday

## 2022-04-10 NOTE — PROGRESS NOTE ADULT - ASSESSMENT
Pt with CKD stage 5, DM for 15 years (was on Ozempic), HTN, CAD, HFrEF, admitted with fluid overload.  # CKD 5 -  started on  HD for fluid overload, advanced CKD  # Etiology of CKD  - likely diabetic nephropathy  # ADHF / HFrEF    - last HD Friday / AVF infiltrated on Friday  - HD tomorrow / if unable to use AVF will need TDC  - cont IV Lasix 80 q12/ non oliguric / add metolazone 5   - cardiology notes appreciated / plan for [facility] tomorrow  - pt had a nephrotic range proteinuria 5 g/g with negative w/u for myeloma, vasculitis APL2R Ab  - check phos, iPTH  - no iv iron d/t high ferritin, start SINCERE if hgb down-trending  - had a Kidney Tx eval at [facility]  - asymmetric edema on LE L>R,  s/p LE Duplex, no DVT  WILL FOLLOW

## 2022-04-11 LAB
ALBUMIN SERPL ELPH-MCNC: 3.8 G/DL — SIGNIFICANT CHANGE UP (ref 3.5–5.2)
ALP SERPL-CCNC: 45 U/L — SIGNIFICANT CHANGE UP (ref 30–115)
ALT FLD-CCNC: 16 U/L — SIGNIFICANT CHANGE UP (ref 0–41)
ANION GAP SERPL CALC-SCNC: 11 MMOL/L — SIGNIFICANT CHANGE UP (ref 7–14)
APTT BLD: 47.6 SEC — HIGH (ref 27–39.2)
AST SERPL-CCNC: 22 U/L — SIGNIFICANT CHANGE UP (ref 0–41)
BASOPHILS # BLD AUTO: 0.07 K/UL — SIGNIFICANT CHANGE UP (ref 0–0.2)
BASOPHILS NFR BLD AUTO: 1 % — SIGNIFICANT CHANGE UP (ref 0–1)
BILIRUB SERPL-MCNC: 0.3 MG/DL — SIGNIFICANT CHANGE UP (ref 0.2–1.2)
BLD GP AB SCN SERPL QL: SIGNIFICANT CHANGE UP
BUN SERPL-MCNC: 78 MG/DL — CRITICAL HIGH (ref 10–20)
CALCIUM SERPL-MCNC: 9.3 MG/DL — SIGNIFICANT CHANGE UP (ref 8.4–10.5)
CALCIUM SERPL-MCNC: 9.5 MG/DL — SIGNIFICANT CHANGE UP (ref 8.5–10.1)
CHLORIDE SERPL-SCNC: 99 MMOL/L — SIGNIFICANT CHANGE UP (ref 98–110)
CO2 SERPL-SCNC: 30 MMOL/L — SIGNIFICANT CHANGE UP (ref 17–32)
CREAT SERPL-MCNC: 4.5 MG/DL — CRITICAL HIGH (ref 0.7–1.5)
EGFR: 13 ML/MIN/1.73M2 — LOW
EOSINOPHIL # BLD AUTO: 0.38 K/UL — SIGNIFICANT CHANGE UP (ref 0–0.7)
EOSINOPHIL NFR BLD AUTO: 5.6 % — SIGNIFICANT CHANGE UP (ref 0–8)
GLUCOSE BLDC GLUCOMTR-MCNC: 125 MG/DL — HIGH (ref 70–99)
GLUCOSE BLDC GLUCOMTR-MCNC: 139 MG/DL — HIGH (ref 70–99)
GLUCOSE BLDC GLUCOMTR-MCNC: 140 MG/DL — HIGH (ref 70–99)
GLUCOSE SERPL-MCNC: 114 MG/DL — HIGH (ref 70–99)
HCT VFR BLD CALC: 32.1 % — LOW (ref 42–52)
HGB BLD-MCNC: 10.1 G/DL — LOW (ref 14–18)
IMM GRANULOCYTES NFR BLD AUTO: 0.3 % — SIGNIFICANT CHANGE UP (ref 0.1–0.3)
INR BLD: 1.06 RATIO — SIGNIFICANT CHANGE UP (ref 0.65–1.3)
IRON SATN MFR SERPL: 24 % — SIGNIFICANT CHANGE UP (ref 15–50)
IRON SATN MFR SERPL: 70 UG/DL — SIGNIFICANT CHANGE UP (ref 35–150)
LYMPHOCYTES # BLD AUTO: 0.77 K/UL — LOW (ref 1.2–3.4)
LYMPHOCYTES # BLD AUTO: 11.4 % — LOW (ref 20.5–51.1)
MAGNESIUM SERPL-MCNC: 2.1 MG/DL — SIGNIFICANT CHANGE UP (ref 1.8–2.4)
MCHC RBC-ENTMCNC: 26.7 PG — LOW (ref 27–31)
MCHC RBC-ENTMCNC: 31.5 G/DL — LOW (ref 32–37)
MCV RBC AUTO: 84.9 FL — SIGNIFICANT CHANGE UP (ref 80–94)
MONOCYTES # BLD AUTO: 0.88 K/UL — HIGH (ref 0.1–0.6)
MONOCYTES NFR BLD AUTO: 13 % — HIGH (ref 1.7–9.3)
NEUTROPHILS # BLD AUTO: 4.66 K/UL — SIGNIFICANT CHANGE UP (ref 1.4–6.5)
NEUTROPHILS NFR BLD AUTO: 68.7 % — SIGNIFICANT CHANGE UP (ref 42.2–75.2)
NRBC # BLD: 0 /100 WBCS — SIGNIFICANT CHANGE UP (ref 0–0)
PHOSPHATE SERPL-MCNC: 4.4 MG/DL — SIGNIFICANT CHANGE UP (ref 2.1–4.9)
PLATELET # BLD AUTO: 145 K/UL — SIGNIFICANT CHANGE UP (ref 130–400)
POTASSIUM SERPL-MCNC: 4.5 MMOL/L — SIGNIFICANT CHANGE UP (ref 3.5–5)
POTASSIUM SERPL-SCNC: 4.5 MMOL/L — SIGNIFICANT CHANGE UP (ref 3.5–5)
PROT SERPL-MCNC: 5.8 G/DL — LOW (ref 6–8)
PROTHROM AB SERPL-ACNC: 12.2 SEC — SIGNIFICANT CHANGE UP (ref 9.95–12.87)
PTH-INTACT FLD-MCNC: 102 PG/ML — HIGH (ref 15–65)
RBC # BLD: 3.78 M/UL — LOW (ref 4.7–6.1)
RBC # FLD: 15.6 % — HIGH (ref 11.5–14.5)
SODIUM SERPL-SCNC: 140 MMOL/L — SIGNIFICANT CHANGE UP (ref 135–146)
TIBC SERPL-MCNC: 296 UG/DL — SIGNIFICANT CHANGE UP (ref 220–430)
TRANSFERRIN SERPL-MCNC: 241 MG/DL — SIGNIFICANT CHANGE UP (ref 200–360)
UIBC SERPL-MCNC: 226 UG/DL — SIGNIFICANT CHANGE UP (ref 110–370)
WBC # BLD: 6.78 K/UL — SIGNIFICANT CHANGE UP (ref 4.8–10.8)
WBC # FLD AUTO: 6.78 K/UL — SIGNIFICANT CHANGE UP (ref 4.8–10.8)

## 2022-04-11 PROCEDURE — 77001 FLUOROGUIDE FOR VEIN DEVICE: CPT | Mod: 26

## 2022-04-11 PROCEDURE — 99233 SBSQ HOSP IP/OBS HIGH 50: CPT

## 2022-04-11 PROCEDURE — 36558 INSERT TUNNELED CV CATH: CPT

## 2022-04-11 PROCEDURE — 76937 US GUIDE VASCULAR ACCESS: CPT | Mod: 26

## 2022-04-11 RX ADMIN — Medication 1 TABLET(S): at 17:32

## 2022-04-11 RX ADMIN — LORATADINE 10 MILLIGRAM(S): 10 TABLET ORAL at 17:32

## 2022-04-11 RX ADMIN — Medication 80 MILLIGRAM(S): at 17:36

## 2022-04-11 RX ADMIN — ATORVASTATIN CALCIUM 80 MILLIGRAM(S): 80 TABLET, FILM COATED ORAL at 22:00

## 2022-04-11 RX ADMIN — Medication 80 MILLIGRAM(S): at 05:08

## 2022-04-11 RX ADMIN — PANTOPRAZOLE SODIUM 40 MILLIGRAM(S): 20 TABLET, DELAYED RELEASE ORAL at 05:09

## 2022-04-11 RX ADMIN — Medication 25 MILLIGRAM(S): at 17:36

## 2022-04-11 RX ADMIN — Medication 81 MILLIGRAM(S): at 17:40

## 2022-04-11 RX ADMIN — Medication 25 MILLIGRAM(S): at 05:09

## 2022-04-11 RX ADMIN — CLOPIDOGREL BISULFATE 75 MILLIGRAM(S): 75 TABLET, FILM COATED ORAL at 17:40

## 2022-04-11 RX ADMIN — Medication 30 MILLIGRAM(S): at 21:59

## 2022-04-11 NOTE — PROGRESS NOTE ADULT - ASSESSMENT
SUBJ:No chest pain or shortness of breath      MEDICATIONS  (STANDING):  aspirin  chewable 81 milliGRAM(s) Oral daily  atorvastatin 80 milliGRAM(s) Oral at bedtime  clopidogrel Tablet 75 milliGRAM(s) Oral daily  furosemide   Injectable 80 milliGRAM(s) IV Push two times a day  loratadine 10 milliGRAM(s) Oral daily  metolazone 5 milliGRAM(s) Oral daily  metoprolol tartrate 25 milliGRAM(s) Oral two times a day  multivitamin 1 Tablet(s) Oral daily  NIFEdipine XL 30 milliGRAM(s) Oral every 24 hours  pantoprazole    Tablet 40 milliGRAM(s) Oral before breakfast    MEDICATIONS  (PRN):  acetaminophen     Tablet .. 650 milliGRAM(s) Oral every 6 hours PRN Temp greater or equal to 38C (100.4F), Mild Pain (1 - 3)  melatonin 3 milliGRAM(s) Oral at bedtime PRN Insomnia  nitroglycerin     SubLingual 0.4 milliGRAM(s) SubLingual every 5 minutes PRN Chest Pain            Vital Signs Last 24 Hrs  T(C): 36.4 (11 Apr 2022 19:35), Max: 36.4 (11 Apr 2022 19:35)  T(F): 97.6 (11 Apr 2022 19:35), Max: 97.6 (11 Apr 2022 19:35)  HR: 83 (11 Apr 2022 19:35) (79 - 87)  BP: 135/65 (11 Apr 2022 19:35) (133/59 - 176/78)  BP(mean): --  RR: 18 (11 Apr 2022 19:35) (18 - 20)  SpO2: 97% (11 Apr 2022 08:15) (97% - 97%)      ECG:NML    TTE:    LABS:                        10.1   6.78  )-----------( 145      ( 11 Apr 2022 05:21 )             32.1     04-11    140  |  99  |  78<HH>  ----------------------------<  114<H>  4.5   |  30  |  4.5<HH>    Ca    9.5      11 Apr 2022 05:21  Phos  4.4     04-11  Mg     2.1     04-11    TPro  5.8<L>  /  Alb  3.8  /  TBili  0.3  /  DBili  x   /  AST  22  /  ALT  16  /  AlkPhos  45  04-11        PT/INR - ( 11 Apr 2022 05:21 )   PT: 12.20 sec;   INR: 1.06 ratio         PTT - ( 11 Apr 2022 05:21 )  PTT:47.6 sec    I&O's Summary    10 Apr 2022 07:01  -  11 Apr 2022 07:00  --------------------------------------------------------  IN: 836 mL / OUT: 2920 mL / NET: -2084 mL    11 Apr 2022 07:01  -  11 Apr 2022 22:10  --------------------------------------------------------  IN: 200 mL / OUT: 2840 mL / NET: -2640 mL      BNP          pt w/ catheter placed for dialysis. plan for cath tomorrow.

## 2022-04-11 NOTE — PROGRESS NOTE ADULT - ASSESSMENT
Pt with CKD stage 5, DM for 15 years (was on Ozempic), HTN, CAD, HFrEF, admitted with fluid overload.  # CKD 5 -  started on  HD for fluid overload, advanced CKD  # Etiology of CKD  - likely diabetic nephropathy  # ADHF / HFrEF    - last HD Friday / AVF infiltrated on Friday  - HD today  / if unable to use AVF will need TDC or non tunneled patient on plavix   - cont IV Lasix 80 q12/ non oliguric / add metolazone 5   - follow BP readings after HD   - cardiology notes appreciated / plan for [facility]  ? tomorrow   - pt had a nephrotic range proteinuria 5 g/g with negative w/u for myeloma, vasculitis APL2R Ab  - ph at goal   - no iv iron d/t high ferritin, start SINCERE if hgb down-trending  - had a Kidney Tx eval at [place]  - asymmetric edema on LE L>R,  s/p LE Duplex, no DVT  - case discussed with cardiology   - will follow

## 2022-04-11 NOTE — PROGRESS NOTE ADULT - SUBJECTIVE AND OBJECTIVE BOX
DAWSON FALLON 71y Male  MRN#: [id number]   Hospital Day: 5d      SUBJECTIVE  Patient is a 71y old Male with PMH HTN, HLD, MI, anemia , CKD5 presents to ED complaining of shortness of breath which started 3 weeks ago and has been getting progressively worse.    Currently admitted to medicine with the primary diagnosis of CHF exacerbation    INTERVAL HPI AND OVERNIGHT EVENTS:  Patient was examined and seen at bedside. This morning he is resting comfortably in bed and reports no issues or overnight events.    REVIEW OF SYMPTOMS:  Denies any HA, CP, Sob. No nausea, fever or sweats    OBJECTIVE  PAST MEDICAL & SURGICAL HISTORY  HTN (hypertension)    Kidney failure    Pneumonia, pneumococcal    Anemia    DM (diabetes mellitus)    Myocardial infarction    AV fistula    History of right common carotid artery stent placement    H/O colonoscopy  5-10 years ago      ALLERGIES:  sulfa drugs (Anaphylaxis; Swelling)    MEDICATIONS:  STANDING MEDICATIONS  aspirin  chewable 81 milliGRAM(s) Oral daily  atorvastatin 80 milliGRAM(s) Oral at bedtime  clopidogrel Tablet 75 milliGRAM(s) Oral daily  furosemide   Injectable 80 milliGRAM(s) IV Push two times a day  heparin   Injectable 5000 Unit(s) SubCutaneous every 8 hours  loratadine 10 milliGRAM(s) Oral daily  metoprolol tartrate 25 milliGRAM(s) Oral two times a day  multivitamin 1 Tablet(s) Oral daily  NIFEdipine XL 30 milliGRAM(s) Oral every 24 hours  pantoprazole    Tablet 40 milliGRAM(s) Oral before breakfast    PRN MEDICATIONS  acetaminophen     Tablet .. 650 milliGRAM(s) Oral every 6 hours PRN  melatonin 3 milliGRAM(s) Oral at bedtime PRN  nitroglycerin     SubLingual 0.4 milliGRAM(s) SubLingual every 5 minutes PRN      VITAL SIGNS: Last 24 Hours  T(C): 36.2 (11 Apr 2022 04:30), Max: 36.2 (11 Apr 2022 04:30)  T(F): 97.1 (11 Apr 2022 04:30), Max: 97.1 (11 Apr 2022 04:30)  HR: 83 (11 Apr 2022 04:30) (83 - 87)  BP: 176/78 (11 Apr 2022 04:30) (164/78 - 176/78)  BP(mean): --  RR: 20 (11 Apr 2022 08:15) (18 - 20)  SpO2: 97% (11 Apr 2022 08:15) (97% - 97%)    LABS:                        10.1   6.78  )-----------( 145      ( 11 Apr 2022 05:21 )             32.1     04-11    140  |  99  |  78<HH>  ----------------------------<  114<H>  4.5   |  30  |  4.5<HH>    Ca    9.5      11 Apr 2022 05:21  Phos  4.4     04-11  Mg     2.1     04-11    TPro  5.8<L>  /  Alb  3.8  /  TBili  0.3  /  DBili  x   /  AST  22  /  ALT  16  /  AlkPhos  45  04-11    PT/INR - ( 11 Apr 2022 05:21 )   PT: 12.20 sec;   INR: 1.06 ratio         PTT - ( 11 Apr 2022 05:21 )  PTT:47.6 sec      RADIOLOGY:      PHYSICAL EXAM:  CONSTITUTIONAL: No acute distress on 2L NC, AAOx3  HEAD: Atraumatic, normocephalic  EYES: conjunctiva and sclera clear  ENT: No JVD  PULMONARY: no wheezes or crackles  CARDIOVASCULAR: Regular rate and rhythm; no murmurs  GASTROINTESTINAL: Soft, non-tender, non-distended; bowel sounds present  MUSCULOSKELETAL: 2+ peripheral pulses; +1 edema  NEUROLOGY: non-focal  SKIN: No rashes or lesions; warm and dry

## 2022-04-11 NOTE — PROGRESS NOTE ADULT - SUBJECTIVE AND OBJECTIVE BOX
VASYL DAWSON  71y Male    CHIEF COMPLAINT:    Patient is a 71y old  Male who presents with a chief complaint of SOB (2022 10:33)      INTERVAL HPI/OVERNIGHT EVENTS:    Patient seen and examined. Still fluid overload but breathing has been improving. AVF not functioning. No cp. Scheduled for cath in AM    ROS: All other systems are negative.    Vital Signs:    T(F): 97.1 (22 @ 04:30), Max: 97.1 (22 @ 04:30)  HR: 83 (22 @ 04:30) (83 - 87)  BP: 176/78 (22 @ 04:30) (164/78 - 176/78)  RR: 20 (22 @ 08:15) (18 - 20)  SpO2: 97% (22 @ 08:15) (97% - 97%)  I&O's Summary    10 Apr 2022 07:  -  2022 07:00  --------------------------------------------------------  IN: 836 mL / OUT: 2920 mL / NET: -2084 mL    2022 07:01  -  2022 11:03  --------------------------------------------------------  IN: 0 mL / OUT: 540 mL / NET: -540 mL      Daily     Daily Weight in k.7 (2022 04:30)  CAPILLARY BLOOD GLUCOSE      POCT Blood Glucose.: 125 mg/dL (2022 07:37)  POCT Blood Glucose.: 168 mg/dL (10 Apr 2022 21:20)  POCT Blood Glucose.: 112 mg/dL (10 Apr 2022 16:12)      PHYSICAL EXAM:    GENERAL:  NAD  SKIN: No rashes or lesions  HENT: Atraumatic. Normocephalic. PERRL. Moist membranes.  NECK: Supple, No JVD. No lymphadenopathy.  PULMONARY: CTA B/L. No wheezing. No rales  CVS: Normal S1, S2. Rate and Rhythm are regular. No murmurs.  ABDOMEN/GI: Soft, Nontender, Nondistended; BS present  EXTREMITIES: Peripheral pulses intact. 1+ pitting edema B/L LE.  NEUROLOGIC:  No motor or sensory deficit.  PSYCH: Alert & oriented x 3    Consultant(s) Notes Reviewed:  [x ] YES  [ ] NO  Care Discussed with Consultants/Other Providers [ x] YES  [ ] NO    EKG reviewed  Telemetry reviewed    LABS:                        10.1   6.78  )-----------( 145      ( 2022 05:21 )             32.1     04-11    140  |  99  |  78<HH>  ----------------------------<  114<H>  4.5   |  30  |  4.5<HH>    Ca    9.5      2022 05:21  Phos  4.4     04-11  Mg     2.1     04-11    TPro  5.8<L>  /  Alb  3.8  /  TBili  0.3  /  DBili  x   /  AST  22  /  ALT  16  /  AlkPhos  45  04-11    PT/INR - ( 2022 05:21 )   PT: 12.20 sec;   INR: 1.06 ratio         PTT - ( 2022 05:21 )  PTT:47.6 sec  Serum Pro-Brain Natriuretic Peptide: 61411 pg/mL (22 @ 15:50)          RADIOLOGY & ADDITIONAL TESTS:    < from: TTE Echo Complete w/o Contrast w/ Doppler (22 @ 15:54) >      Summary:   1. Left ventricular ejection fraction, by visual estimation, is 45 to   50%.   2. Mildly decreased global left ventricular systolic function.   3. Global cardiomyopathy.   4. Moderate to severely increased left ventricular internal cavity size.   5. Moderately increased LV wall thickness.   6. Mildly enlarged right atrium.   7. Moderately enlarged left atrium.   8. Mild thickening of the anterior and posterior mitral valve leaflets.   9. Moderate mitral valve regurgitation.  10. Mild tricuspid regurgitation.  11. Aortic valve is bicuspid.  12. Sclerotic aortic valve with normal opening.  13. Small patent foramen ovale, with predominantly right to left shunting   across the atrial septum.      < end of copied text >    Imaging or report Personally Reviewed:  [ ] YES  [ ] NO    Medications:  Standing  aspirin  chewable 81 milliGRAM(s) Oral daily  atorvastatin 80 milliGRAM(s) Oral at bedtime  clopidogrel Tablet 75 milliGRAM(s) Oral daily  furosemide   Injectable 80 milliGRAM(s) IV Push two times a day  heparin   Injectable 5000 Unit(s) SubCutaneous every 8 hours  loratadine 10 milliGRAM(s) Oral daily  metoprolol tartrate 25 milliGRAM(s) Oral two times a day  multivitamin 1 Tablet(s) Oral daily  NIFEdipine XL 30 milliGRAM(s) Oral every 24 hours  pantoprazole    Tablet 40 milliGRAM(s) Oral before breakfast    PRN Meds  acetaminophen     Tablet .. 650 milliGRAM(s) Oral every 6 hours PRN  melatonin 3 milliGRAM(s) Oral at bedtime PRN  nitroglycerin     SubLingual 0.4 milliGRAM(s) SubLingual every 5 minutes PRN      Case discussed with resident    Care discussed with pt/family

## 2022-04-11 NOTE — PROGRESS NOTE ADULT - SUBJECTIVE AND OBJECTIVE BOX
Vascular & Interventional Radiology Pre-Procedure Note    Procedure Name:     HPI: HPI:  Pt is a 71 male with PMH HTN, HLD, MI, anemia , CKD5 presents to ED complaining of shortness of breath which started 3 weeks ago and has been getting progressively worse. Patient states that little activity gets him sob. He also noted having trouble lying down flat and has had increased LE swelling. Patient had recent echo 1 month ago at [place] which showed significantly reduced LV function (EF 25-30%, G2DD, mild MR). Patient denies any fever, chest pain, palpitations, abdominal pain, nausea, vomiting, diarrhea, dysuria. Patient still has good urine output and has left upper extremity fistula not yet used.     In ED vitals: /62, HR 80, RR 20, T 97.7, Spo2 96% on RA.  Labs significant for BNP 23K, Trop 0.05, CXR looks congested - official read pending.  (2022 21:41)      Allergies:   sulfa drugs (Anaphylaxis; Swelling)      Medications (Abx/Cardiac/Anticoagulation/Blood Products)  aspirin  chewable: 81 milliGRAM(s) Oral (04-10 @ 11:02)  clopidogrel Tablet: 75 milliGRAM(s) Oral (04-10 @ 11:02)  furosemide   Injectable: 80 milliGRAM(s) IV Push ( @ 05:08)  heparin   Injectable: 5000 Unit(s) SubCutaneous (04-10 @ 21:08)  metoprolol tartrate: 25 milliGRAM(s) Oral ( @ 05:09)  NIFEdipine XL: 30 milliGRAM(s) Oral (04-10 @ 20:16)      Data:    T(C): 36.2  HR: 83  BP: 176/78  RR: 20  SpO2: 97%    Exam  General: NAD, AAO x3, no distress  Chest: breathing comfortably on room air  Abdomen: soft, non-tender, non- distended   Extremities: positive pulses bilaterally x4    Radiology & Additional Studies: Radiology imaging reviewed.     Labs:   CBC trend:  WBC 6.78 K/uL        Hg 10.1 g/dL        Hct 32.1 %           Plt 145 K/uL         22 @ 05:21  WBC 7.22 K/uL        Hg 9.5 g/dL         Hct 29.5 %           Plt 147 K/uL         04-10-22 @ 07:30  WBC 5.59 K/uL        Hg 10.9 g/dL        Hct 33.8 %           Plt 156 K/uL         22 @ 04:30    Na: 140    K+: 4.5    CO: 30    CL: 99    BUN: 78    Cr: 4.5    Gap: 22 @ 05:21  Na: 139    K+: 4.6    CO: 27    CL: 99    BUN: 79    Cr: 4.7    Gap: 13   04-10-22 @ 18:11  Na: 139    K+: 4.3    CO: 27    CL: 100    BUN: 76    Cr: 4.6    Gap: 12   04-10-22 @ 07:30      Ca: 9.5    M.1    Phos: 4.4    22 @ 05:21  Ca: 9.5    M.1    Phos: --    04-10-22 @ 18:11  Ca: 8.8    M.1    Phos: --    04-10-22 @ 07:30    Alb 3.8 g/dL        Prot 5.8 g/dL         ALT 16 U/L           AST 22 U/L           Alk Phos 45 U/L           T-Bili 0.3 mg/dL         22 @ 05:21  Alb 3.4 g/dL        Prot 5.4 g/dL         ALT 14 U/L           AST 19 U/L           Alk Phos 41 U/L           T-Bili 0.3 mg/dL         04-10-22 @ 07:30  Alb 4.0 g/dL        Prot 6.0 g/dL         ALT 16 U/L           AST 23 U/L           Alk Phos 48 U/L           T-Bili 0.5 mg/dL         22 @ 04:30    INR:  ---   22 @ 05:21  PT:   ---   22 @ 05:21  PTT:  47.6 sec   22 @ 05:21      Plan:   -71y Male presents for TDC placement, AVF infiltrated when attempting to be used.   -Risks/Benefits/alternatives explained with the patient and witnessed informed consent obtained.

## 2022-04-11 NOTE — PROGRESS NOTE ADULT - ASSESSMENT
Patient is a 71y old Male with PMH HTN, HLD, MI, anemia , CKD5 presents to ED complaining of shortness of breath which started 3 weeks ago and has been getting progressively worse.    Currently admitted to medicine with the primary diagnosis of CHF exacerbation    #SOB most likely secondary to Acute HFrEF   - On admision BNP 23K  - echo 3/29/22 at [facility] showed EF 25-30%, G2DD, mild MR   - repeat echo: EF 45-50%, Bicuspid aortic valve, small patent foramen ovale   - CXR: Increased right basilar opacity/effusion, Stable left perihilar opacity. No pneumothorax  - c/w lasix 80mg IVP BID  - patient has allegies to sulfa drugs, will hold on on metolazone  - cardio consulted - Dr. Valdez: needs cath Tuesday 4/12  - cont metoprolol tartrate 25 bid, not on acei/arb likely due to worsening renal function   - monitor Is and Os, daily weights   - Va duplex: neg for DVT    #CKD5 s/p 1st session of HD (4/7)  #Elevated troponin likely 2/2 ckd   - unknown Cr, GFR baseline  - left arm precautions for AVF   - trop 0.05 -> 0.4  - avoid nephrotoxic agents   - nepro consulted - s/p first HD (4/7)  - AVF infiltrated 4/8  - HD attempted today but failed via AVF  - IR consulted for a tunneled Catheter for HD  - HD planned today after tunneled catheter placement today    #HTN - controlled  - on amlodipine at home  - monitor off BP meds for now    #HLD  - Held fenofibrate due to CKD  - c/w lipitor 80mg daiy    #CAD  - c/w lipitor 80mg daiy  - c/w ASA 81mg daily  - c/w plavix 75mg daily  - c/w metoprolol tart 25mg BID    #CORAZON   - cont multivitamin     #Misc   Diet: DASH/CC   Activity: IAT   GI ppx: PPI   DVT: ppx heparin   Code status: Full code    Dispo: Acute pending euvolemia, HD today after tunneled cath, cath on Tuesday

## 2022-04-11 NOTE — PROGRESS NOTE ADULT - ASSESSMENT
A 71 years old male presented to the ED with progressively worsening sob for the last one week. Also c/o LE swelling and orthopnea.     Hb: 10.4   BUN/Cr: 82/5.2  Pro BNP: 63068  CXR: B/L pleural effusion. Increase intersitial markings. (Check official report)  Trop: 0.05-->0.04      1. Acute HFmrEF  2. DM-2 / HTN / DL  3. CKD-5. On HD now  4. Anemia of chronic disease  5. CAD / Abnormal stress test             PLAN:    ·	Cont tele  ·	Nephrology f/u noted. Recommended to cont Lasix 80 mg ivp q 12h. Add Metolazone 5 mg po daily  ·	Care d/w the Nephrology. AVF is infiltrated. Will get TDC and HD today  ·	Check i's and o's and daily wt.   ·	Low salt diet and water restriction to 1.5 L/D  ·	ECHO showed EF is 45-50%. Moderate MR  ·	Care d/w the cardiology. Cath tomorrow.   ·	Cont ASA, Plavix and Metoprolol  ·	Monitor FS  ·	Cont Metoprolol and his other home meds.   ·	Venous doppler of the LE is negative for DVT    Progress Note Handoff    Pending (specify):  Consults_________, Tests__, Test Results_______, Other__TDC and HD today. Cath on Tuesday. _______  Family discussion:  Disposition: Home___/SNF___/Other________/Unknown at this time________    Sebastien Warren MD  Spectra: [id number]

## 2022-04-11 NOTE — PROGRESS NOTE ADULT - SUBJECTIVE AND OBJECTIVE BOX
INTERVENTIONAL RADIOLOGY BRIEF-OPERATIVE NOTE    Procedure: tunneled hd cath placement    Pre-Op Diagnosis: esrd    Post-Op Diagnosis: same as pre    Attending: Sherry  Resident:     Anesthesia (type):  [ ] General Anesthesia  [ ] Deep Sedation - provided by anesthesiologist  [x ] Conscious Sedation -  Versed and Fentanyl   [x ] Local/Regional    Contrast: 0    Estimated Blood Loss: 0    Condition:   [ ] Critical  [ ] Serious  [ ] Fair   [x ] Good    Findings/Follow up Plan of Care: successful TDC placement, right side, 15.5F 19cm tip to cuff. ok to use    Specimens Removed: none    Implants: above    Complications: none    Disposition: back to room.       Please call Interventional Radiology [phone] with any questions, concerns, or issues.

## 2022-04-11 NOTE — PROGRESS NOTE ADULT - SUBJECTIVE AND OBJECTIVE BOX
seen and examined  feels better         PAST HISTORY  --------------------------------------------------------------------------------  No significant changes to PMH, PSH, FHx, SHx, unless otherwise noted    ALLERGIES & MEDICATIONS  --------------------------------------------------------------------------------  Allergies    sulfa drugs (Anaphylaxis; Swelling)    Intolerances      Standing Inpatient Medications  aspirin  chewable 81 milliGRAM(s) Oral daily  atorvastatin 80 milliGRAM(s) Oral at bedtime  clopidogrel Tablet 75 milliGRAM(s) Oral daily  furosemide   Injectable 80 milliGRAM(s) IV Push two times a day  heparin   Injectable 5000 Unit(s) SubCutaneous every 8 hours  loratadine 10 milliGRAM(s) Oral daily  metoprolol tartrate 25 milliGRAM(s) Oral two times a day  multivitamin 1 Tablet(s) Oral daily  NIFEdipine XL 30 milliGRAM(s) Oral every 24 hours  pantoprazole    Tablet 40 milliGRAM(s) Oral before breakfast    PRN Inpatient Medications  acetaminophen     Tablet .. 650 milliGRAM(s) Oral every 6 hours PRN  melatonin 3 milliGRAM(s) Oral at bedtime PRN  nitroglycerin     SubLingual 0.4 milliGRAM(s) SubLingual every 5 minutes PRN        VITALS/PHYSICAL EXAM  --------------------------------------------------------------------------------  T(C): 36.2 (04-11-22 @ 04:30), Max: 36.2 (04-11-22 @ 04:30)  HR: 83 (04-11-22 @ 04:30) (83 - 87)  BP: 176/78 (04-11-22 @ 04:30) (164/78 - 176/78)  RR: 20 (04-11-22 @ 08:15) (18 - 20)  SpO2: 97% (04-11-22 @ 08:15) (97% - 97%)  Wt(kg): --        04-10-22 @ 07:01  -  04-11-22 @ 07:00  --------------------------------------------------------  IN: 836 mL / OUT: 2920 mL / NET: -2084 mL      Physical Exam:  	Gen: NAD,   	Pulm: CTA B/L  	CV:  S1S2; no rub  	Abd:  soft, nontender/nondistended  	LE:  edema  	Vascular access:av    LABS/STUDIES  --------------------------------------------------------------------------------              10.1   6.78  >-----------<  145      [04-11-22 @ 05:21]              32.1     140  |  99  |  78  ----------------------------<  114      [04-11-22 @ 05:21]  4.5   |  30  |  4.5        Ca     9.5     [04-11-22 @ 05:21]      Mg     2.1     [04-11-22 @ 05:21]      Phos  4.4     [04-11-22 @ 05:21]    TPro  5.8  /  Alb  3.8  /  TBili  0.3  /  DBili  x   /  AST  22  /  ALT  16  /  AlkPhos  45  [04-11-22 @ 05:21]    PT/INR: PT 12.20, INR 1.06       [04-11-22 @ 05:21]  PTT: 47.6       [04-11-22 @ 05:21]      Creatinine Trend:  SCr 4.5 [04-11 @ 05:21]  SCr 4.7 [04-10 @ 18:11]  SCr 4.6 [04-10 @ 07:30]  SCr 4.1 [04-09 @ 04:30]  SCr 4.1 [04-08 @ 20:00]        Ferritin 1351      [04-09-22 @ 04:30]  Lipid: chol 101, TG 96, HDL 41, LDL --      [04-07-22 @ 05:30]    HBsAb <3.0      [04-07-22 @ 15:34]  HBsAb Nonreact      [04-07-22 @ 15:34]  HBsAg Nonreact      [04-07-22 @ 15:34]  HBcAb Nonreact      [04-07-22 @ 15:34]

## 2022-04-11 NOTE — PROGRESS NOTE ADULT - SUBJECTIVE AND OBJECTIVE BOX
SUBJ:No chest pain or shortness of breath      MEDICATIONS  (STANDING):  aspirin  chewable 81 milliGRAM(s) Oral daily  atorvastatin 80 milliGRAM(s) Oral at bedtime  clopidogrel Tablet 75 milliGRAM(s) Oral daily  furosemide   Injectable 80 milliGRAM(s) IV Push two times a day  loratadine 10 milliGRAM(s) Oral daily  metolazone 5 milliGRAM(s) Oral daily  metoprolol tartrate 25 milliGRAM(s) Oral two times a day  multivitamin 1 Tablet(s) Oral daily  NIFEdipine XL 30 milliGRAM(s) Oral every 24 hours  pantoprazole    Tablet 40 milliGRAM(s) Oral before breakfast    MEDICATIONS  (PRN):  acetaminophen     Tablet .. 650 milliGRAM(s) Oral every 6 hours PRN Temp greater or equal to 38C (100.4F), Mild Pain (1 - 3)  melatonin 3 milliGRAM(s) Oral at bedtime PRN Insomnia  nitroglycerin     SubLingual 0.4 milliGRAM(s) SubLingual every 5 minutes PRN Chest Pain            Vital Signs Last 24 Hrs  T(C): 36.4 (11 Apr 2022 19:35), Max: 36.4 (11 Apr 2022 19:35)  T(F): 97.6 (11 Apr 2022 19:35), Max: 97.6 (11 Apr 2022 19:35)  HR: 83 (11 Apr 2022 19:35) (79 - 87)  BP: 135/65 (11 Apr 2022 19:35) (133/59 - 176/78)  BP(mean): --  RR: 18 (11 Apr 2022 19:35) (18 - 20)  SpO2: 97% (11 Apr 2022 08:15) (97% - 97%)      ECG:NML    TTE:    LABS:                        10.1   6.78  )-----------( 145      ( 11 Apr 2022 05:21 )             32.1     04-11    140  |  99  |  78<HH>  ----------------------------<  114<H>  4.5   |  30  |  4.5<HH>    Ca    9.5      11 Apr 2022 05:21  Phos  4.4     04-11  Mg     2.1     04-11    TPro  5.8<L>  /  Alb  3.8  /  TBili  0.3  /  DBili  x   /  AST  22  /  ALT  16  /  AlkPhos  45  04-11        PT/INR - ( 11 Apr 2022 05:21 )   PT: 12.20 sec;   INR: 1.06 ratio         PTT - ( 11 Apr 2022 05:21 )  PTT:47.6 sec    I&O's Summary    10 Apr 2022 07:01  -  11 Apr 2022 07:00  --------------------------------------------------------  IN: 836 mL / OUT: 2920 mL / NET: -2084 mL    11 Apr 2022 07:01  -  11 Apr 2022 22:10  --------------------------------------------------------  IN: 200 mL / OUT: 2840 mL / NET: -2640 mL      BNP

## 2022-04-12 LAB
ALBUMIN SERPL ELPH-MCNC: 4 G/DL — SIGNIFICANT CHANGE UP (ref 3.5–5.2)
ALP SERPL-CCNC: 45 U/L — SIGNIFICANT CHANGE UP (ref 30–115)
ALT FLD-CCNC: 16 U/L — SIGNIFICANT CHANGE UP (ref 0–41)
ANION GAP SERPL CALC-SCNC: 14 MMOL/L — SIGNIFICANT CHANGE UP (ref 7–14)
AST SERPL-CCNC: 25 U/L — SIGNIFICANT CHANGE UP (ref 0–41)
BASOPHILS # BLD AUTO: 0.06 K/UL — SIGNIFICANT CHANGE UP (ref 0–0.2)
BASOPHILS NFR BLD AUTO: 0.7 % — SIGNIFICANT CHANGE UP (ref 0–1)
BILIRUB SERPL-MCNC: 0.5 MG/DL — SIGNIFICANT CHANGE UP (ref 0.2–1.2)
BUN SERPL-MCNC: 53 MG/DL — HIGH (ref 10–20)
CALCIUM SERPL-MCNC: 9.2 MG/DL — SIGNIFICANT CHANGE UP (ref 8.5–10.1)
CHLORIDE SERPL-SCNC: 98 MMOL/L — SIGNIFICANT CHANGE UP (ref 98–110)
CO2 SERPL-SCNC: 28 MMOL/L — SIGNIFICANT CHANGE UP (ref 17–32)
CREAT SERPL-MCNC: 3.7 MG/DL — HIGH (ref 0.7–1.5)
EGFR: 17 ML/MIN/1.73M2 — LOW
EOSINOPHIL # BLD AUTO: 0.35 K/UL — SIGNIFICANT CHANGE UP (ref 0–0.7)
EOSINOPHIL NFR BLD AUTO: 4.3 % — SIGNIFICANT CHANGE UP (ref 0–8)
GLUCOSE BLDC GLUCOMTR-MCNC: 118 MG/DL — HIGH (ref 70–99)
GLUCOSE BLDC GLUCOMTR-MCNC: 132 MG/DL — HIGH (ref 70–99)
GLUCOSE BLDC GLUCOMTR-MCNC: 147 MG/DL — HIGH (ref 70–99)
GLUCOSE SERPL-MCNC: 115 MG/DL — HIGH (ref 70–99)
HCT VFR BLD CALC: 33.2 % — LOW (ref 42–52)
HGB BLD-MCNC: 10.7 G/DL — LOW (ref 14–18)
IMM GRANULOCYTES NFR BLD AUTO: 0.2 % — SIGNIFICANT CHANGE UP (ref 0.1–0.3)
LYMPHOCYTES # BLD AUTO: 0.93 K/UL — LOW (ref 1.2–3.4)
LYMPHOCYTES # BLD AUTO: 11.4 % — LOW (ref 20.5–51.1)
MAGNESIUM SERPL-MCNC: 2 MG/DL — SIGNIFICANT CHANGE UP (ref 1.8–2.4)
MCHC RBC-ENTMCNC: 27.2 PG — SIGNIFICANT CHANGE UP (ref 27–31)
MCHC RBC-ENTMCNC: 32.2 G/DL — SIGNIFICANT CHANGE UP (ref 32–37)
MCV RBC AUTO: 84.3 FL — SIGNIFICANT CHANGE UP (ref 80–94)
MONOCYTES # BLD AUTO: 0.95 K/UL — HIGH (ref 0.1–0.6)
MONOCYTES NFR BLD AUTO: 11.7 % — HIGH (ref 1.7–9.3)
NEUTROPHILS # BLD AUTO: 5.84 K/UL — SIGNIFICANT CHANGE UP (ref 1.4–6.5)
NEUTROPHILS NFR BLD AUTO: 71.7 % — SIGNIFICANT CHANGE UP (ref 42.2–75.2)
NRBC # BLD: 0 /100 WBCS — SIGNIFICANT CHANGE UP (ref 0–0)
PLATELET # BLD AUTO: 163 K/UL — SIGNIFICANT CHANGE UP (ref 130–400)
POTASSIUM SERPL-MCNC: 4 MMOL/L — SIGNIFICANT CHANGE UP (ref 3.5–5)
POTASSIUM SERPL-SCNC: 4 MMOL/L — SIGNIFICANT CHANGE UP (ref 3.5–5)
PROT SERPL-MCNC: 6.1 G/DL — SIGNIFICANT CHANGE UP (ref 6–8)
RBC # BLD: 3.94 M/UL — LOW (ref 4.7–6.1)
RBC # FLD: 15.6 % — HIGH (ref 11.5–14.5)
SODIUM SERPL-SCNC: 140 MMOL/L — SIGNIFICANT CHANGE UP (ref 135–146)
WBC # BLD: 8.15 K/UL — SIGNIFICANT CHANGE UP (ref 4.8–10.8)
WBC # FLD AUTO: 8.15 K/UL — SIGNIFICANT CHANGE UP (ref 4.8–10.8)

## 2022-04-12 PROCEDURE — 93010 ELECTROCARDIOGRAM REPORT: CPT

## 2022-04-12 PROCEDURE — 99233 SBSQ HOSP IP/OBS HIGH 50: CPT

## 2022-04-12 RX ORDER — TICAGRELOR 90 MG/1
90 TABLET ORAL EVERY 12 HOURS
Refills: 0 | Status: DISCONTINUED | OUTPATIENT
Start: 2022-04-13 | End: 2022-04-14

## 2022-04-12 RX ADMIN — Medication 80 MILLIGRAM(S): at 05:54

## 2022-04-12 RX ADMIN — ATORVASTATIN CALCIUM 80 MILLIGRAM(S): 80 TABLET, FILM COATED ORAL at 21:13

## 2022-04-12 RX ADMIN — Medication 650 MILLIGRAM(S): at 06:30

## 2022-04-12 RX ADMIN — LORATADINE 10 MILLIGRAM(S): 10 TABLET ORAL at 11:27

## 2022-04-12 RX ADMIN — Medication 650 MILLIGRAM(S): at 05:52

## 2022-04-12 RX ADMIN — Medication 25 MILLIGRAM(S): at 05:53

## 2022-04-12 RX ADMIN — PANTOPRAZOLE SODIUM 40 MILLIGRAM(S): 20 TABLET, DELAYED RELEASE ORAL at 05:54

## 2022-04-12 RX ADMIN — Medication 81 MILLIGRAM(S): at 11:27

## 2022-04-12 RX ADMIN — Medication 1 TABLET(S): at 11:27

## 2022-04-12 RX ADMIN — CLOPIDOGREL BISULFATE 75 MILLIGRAM(S): 75 TABLET, FILM COATED ORAL at 11:27

## 2022-04-12 RX ADMIN — Medication 30 MILLIGRAM(S): at 20:50

## 2022-04-12 NOTE — PROGRESS NOTE ADULT - ASSESSMENT
pt for cath/pci. pt aware of alternatives and risks including dearh and agrees w/ planned procedure.

## 2022-04-12 NOTE — CHART NOTE - NSCHARTNOTEFT_GEN_A_CORE
PRE-OP DIAGNOSIS:    unstable angina, cardiomyopathy, AUC of 8    PROCEDURE:     [x] Coronary Angiogram     [] LHC     [] LVG     [] RHC     [x] Intervention (see below)         PHYSICIAN:  Dr. Calvillo    ASSISTANT:  Dr. Woods       PROCEDURE DESCRIPTION:     Consent:      [x] Patient     [] Family Member     []  Used        Anesthesia:     [] General     [x] Sedation     [x] Local        Access & Closure:     [] Fr Radial Artery     [x] 6 Fr right Femoral Artery (manual)    [] Fr Femoral Vein     [] Fr Brachial Vein       IV Contrast: 150 mL        Intervention:   - successful balloon angioplasty, and IVUS guided PCI with KRISHAN x 1 in distal RCA    Implants:   - 4.0 x 16 mm Synergy KRISHAN x 1 in distal RCA     FINDINGS:     Coronary Dominance: right dominant      LM: normal    LAD: mild disease in prox and mid LAD, 40% stenosis of mid LAD  D1: minor luminal irregularities    LCX: mild disease  OM1: minor luminal irregularities    Ramus: minor luminal irregularities    RCA: mild disease of prox and mid RCA, 20% in-stent restenosis of prox RCA, 30% in-stent restenosis of mid RCA. 99% stenosis of distal RCA s/p PCI       ESTIMATED BLOOD LOSS: < 10 mL        CONDITION:     [x] Good     [] Fair     [] Critical        SPECIMEN REMOVED: N/A       POST-OP DIAGNOSIS:      [] Normal Coronary Angiogram     [] Mild Coronary Artery Disease (< 50% stenosis)     [x] 1 Vessel Coronary Artery Disease (distal RCA), s/p PCI       PLAN OF CARE:     [x] Return to In-patient bed     [x] Medications:   - start Brilinta 90mg po q12  - continue with ASA, statin, and beta-blocker    [x] IV Fluids: No IVF, HD as per schedule

## 2022-04-12 NOTE — PROGRESS NOTE ADULT - SUBJECTIVE AND OBJECTIVE BOX
DAWSON FALLON 71y Male  MRN#: [id number]   Hospital Day: 6d    SUBJECTIVE  Patient is a 71y old Male with PMH HTN, HLD, MI, anemia , CKD5 presents to ED complaining of shortness of breath which started 3 weeks ago and has been getting progressively worse.    Currently admitted to medicine with the primary diagnosis of CHF exacerbation    INTERVAL HPI AND OVERNIGHT EVENTS:  Patient was examined and seen at bedside. This morning he is resting comfortably in bed and reports no issues or overnight events.    REVIEW OF SYMPTOMS:  admits to being tired and msk pain at the site of the tunneled cath    OBJECTIVE  PAST MEDICAL & SURGICAL HISTORY  HTN (hypertension)    Kidney failure    Pneumonia, pneumococcal    Anemia    DM (diabetes mellitus)    Myocardial infarction    AV fistula    History of right common carotid artery stent placement    H/O colonoscopy  5-10 years ago      ALLERGIES:  sulfa drugs (Anaphylaxis; Swelling)    MEDICATIONS:  STANDING MEDICATIONS  aspirin  chewable 81 milliGRAM(s) Oral daily  atorvastatin 80 milliGRAM(s) Oral at bedtime  clopidogrel Tablet 75 milliGRAM(s) Oral daily  furosemide   Injectable 80 milliGRAM(s) IV Push two times a day  loratadine 10 milliGRAM(s) Oral daily  metolazone 5 milliGRAM(s) Oral daily  metoprolol tartrate 25 milliGRAM(s) Oral two times a day  multivitamin 1 Tablet(s) Oral daily  NIFEdipine XL 30 milliGRAM(s) Oral every 24 hours  pantoprazole    Tablet 40 milliGRAM(s) Oral before breakfast    PRN MEDICATIONS  acetaminophen     Tablet .. 650 milliGRAM(s) Oral every 6 hours PRN  melatonin 3 milliGRAM(s) Oral at bedtime PRN  nitroglycerin     SubLingual 0.4 milliGRAM(s) SubLingual every 5 minutes PRN      VITAL SIGNS: Last 24 Hours  T(C): 35.8 (12 Apr 2022 05:07), Max: 36.4 (11 Apr 2022 19:35)  T(F): 96.4 (12 Apr 2022 05:07), Max: 97.6 (11 Apr 2022 19:35)  HR: 75 (12 Apr 2022 05:07) (75 - 87)  BP: 150/70 (12 Apr 2022 05:07) (133/59 - 157/76)  BP(mean): --  RR: 18 (12 Apr 2022 05:07) (18 - 20)  SpO2: --    LABS:                        10.7   8.15  )-----------( 163      ( 12 Apr 2022 07:40 )             33.2     04-12    140  |  98  |  53<H>  ----------------------------<  115<H>  4.0   |  28  |  3.7<H>    Ca    9.2      12 Apr 2022 07:40  Phos  4.4     04-11  Mg     2.0     04-12    TPro  6.1  /  Alb  4.0  /  TBili  0.5  /  DBili  x   /  AST  25  /  ALT  16  /  AlkPhos  45  04-12    PT/INR - ( 11 Apr 2022 05:21 )   PT: 12.20 sec;   INR: 1.06 ratio         PTT - ( 11 Apr 2022 05:21 )  PTT:47.6 sec      RADIOLOGY:    PHYSICAL EXAM:  CONSTITUTIONAL: No acute distress, AAOx3  HEAD: Atraumatic, normocephalic  EYES: conjunctiva and sclera clear  ENT: No JVD  PULMONARY: no wheezes or crackles  CARDIOVASCULAR: Regular rate and rhythm; no murmurs  GASTROINTESTINAL: Soft, non-tender, non-distended; bowel sounds present  MUSCULOSKELETAL: 2+ peripheral pulses; trace edema, right sided tunneled cath, no erythema/swelling at site  NEUROLOGY: non-focal  SKIN: No rashes or lesions; warm and dry

## 2022-04-12 NOTE — PROGRESS NOTE ADULT - SUBJECTIVE AND OBJECTIVE BOX
SUBJ:No chest pain or shortness of breath      MEDICATIONS  (STANDING):  aspirin  chewable 81 milliGRAM(s) Oral daily  atorvastatin 80 milliGRAM(s) Oral at bedtime  clopidogrel Tablet 75 milliGRAM(s) Oral daily  furosemide   Injectable 80 milliGRAM(s) IV Push two times a day  loratadine 10 milliGRAM(s) Oral daily  metolazone 5 milliGRAM(s) Oral daily  metoprolol tartrate 25 milliGRAM(s) Oral two times a day  multivitamin 1 Tablet(s) Oral daily  NIFEdipine XL 30 milliGRAM(s) Oral every 24 hours  pantoprazole    Tablet 40 milliGRAM(s) Oral before breakfast    MEDICATIONS  (PRN):  acetaminophen     Tablet .. 650 milliGRAM(s) Oral every 6 hours PRN Temp greater or equal to 38C (100.4F), Mild Pain (1 - 3)  melatonin 3 milliGRAM(s) Oral at bedtime PRN Insomnia  nitroglycerin     SubLingual 0.4 milliGRAM(s) SubLingual every 5 minutes PRN Chest Pain            Vital Signs Last 24 Hrs  T(C): 36.4 (12 Apr 2022 13:32), Max: 36.4 (11 Apr 2022 19:35)  T(F): 97.6 (12 Apr 2022 13:32), Max: 97.6 (11 Apr 2022 19:35)  HR: 74 (12 Apr 2022 13:32) (74 - 87)  BP: 178/84 (12 Apr 2022 13:32) (133/59 - 178/84)  BP(mean): --  RR: 20 (12 Apr 2022 13:32) (18 - 20)  SpO2: --      ECG:NML    TTE:    LABS:                        10.7   8.15  )-----------( 163      ( 12 Apr 2022 07:40 )             33.2     04-12    140  |  98  |  53<H>  ----------------------------<  115<H>  4.0   |  28  |  3.7<H>    Ca    9.2      12 Apr 2022 07:40  Phos  4.4     04-11  Mg     2.0     04-12    TPro  6.1  /  Alb  4.0  /  TBili  0.5  /  DBili  x   /  AST  25  /  ALT  16  /  AlkPhos  45  04-12        PT/INR - ( 11 Apr 2022 05:21 )   PT: 12.20 sec;   INR: 1.06 ratio         PTT - ( 11 Apr 2022 05:21 )  PTT:47.6 sec    I&O's Summary    11 Apr 2022 07:01  -  12 Apr 2022 07:00  --------------------------------------------------------  IN: 380 mL / OUT: 3190 mL / NET: -2810 mL    12 Apr 2022 07:01  -  12 Apr 2022 14:20  --------------------------------------------------------  IN: 0 mL / OUT: 700 mL / NET: -700 mL      BNP

## 2022-04-12 NOTE — PROGRESS NOTE ADULT - ASSESSMENT
A 71 years old male presented to the ED with progressively worsening sob for the last one week. Also c/o LE swelling and orthopnea.     Hb: 10.4   BUN/Cr: 82/5.2  Pro BNP: 08099  CXR: B/L pleural effusion. Increase intersitial markings. (Check official report)  Trop: 0.05-->0.04      1. Acute HFmrEF  2. DM-2 / HTN / DL  3. CKD-5. On HD now  4. Anemia of chronic disease  5. CAD / Abnormal stress test             PLAN:    ·	Cont tele  ·	Scheduled for cath today  ·	S/P TDC placement and HD yesterday  ·	Cont Lasix 80 mg ivp q 12h and Metolazone 5 mg po daily  ·	Check i's and o's and daily wt.   ·	Low salt diet and water restriction to 1.5 L/D  ·	ECHO showed EF is 45-50%. Moderate MR  ·	Cont ASA, Plavix and Metoprolol  ·	Monitor FS  ·	Cont Metoprolol and his other home meds.   ·	Venous doppler of the LE is negative for DVT    Progress Note Handoff    Pending (specify):  Consults_________, Tests__, Test Results_______, Other__Cath today _______  Family discussion:  Disposition: Home___/SNF___/Other________/Unknown at this time________    Sebastien Warren MD  Spectra: [phone]

## 2022-04-12 NOTE — PROGRESS NOTE ADULT - ASSESSMENT
Pt with CKD stage 5, DM for 15 years (was on Ozempic), HTN, CAD, HFrEF, admitted with fluid overload.  # CKD 5 -  started on  HD for fluid overload, advanced CKD  # Etiology of CKD  - likely diabetic nephropathy  # ADHF / HFrEF    - sp HD yesterday sp TDC insertion / next ttt in AM   - cont IV Lasix 80 q12- metolazone / non oliguric   - cardiology notes appreciated / plan for LHC  ? today   - pt had a nephrotic range proteinuria 5 g/g with negative w/u for myeloma, vasculitis APL2R Ab  - ph at goal   - no iv iron d/t high ferritin, start SINCERE if hgb down-trending- no urgent need now   - had a Kidney Tx eval at [facility]  - asymmetric edema on LE L>R,  s/p LE Duplex, no DVT    - will follow

## 2022-04-12 NOTE — PROGRESS NOTE ADULT - ASSESSMENT
Patient is a 71y old Male with PMH HTN, HLD, MI, anemia , CKD5 presents to ED complaining of shortness of breath which started 3 weeks ago and has been getting progressively worse.    Currently admitted to medicine with the primary diagnosis of CHF exacerbation    #SOB most likely secondary to Acute HFrEF   - On admision BNP 23K  - echo 3/29/22 at [facility] showed EF 25-30%, G2DD, mild MR   - repeat echo: EF 45-50%, Bicuspid aortic valve, small patent foramen ovale, Moderate MR  - CXR: Increased right basilar opacity/effusion, Stable left perihilar opacity. No pneumothorax  - c/w lasix 80mg IVP BID  - c/w metolazone 5mg daily  - cardio consulted - Dr. Valdez: plan for cath today  - cont metoprolol tartrate 25 bid, not on acei/arb likely due to worsening renal function   - monitor Is and Os, daily weights   - Va duplex: neg for DVT    #CKD5 s/p 1st session of HD (4/7)  #Elevated troponin likely 2/2 ckd   - unknown Cr, GFR baseline  - left arm precautions for AVF   - trop 0.05 -> 0.4  - avoid nephrotoxic agents   - nepro consulted - s/p first HD (4/7)  - AVF infiltrated 4/8  - s/p  TDC placement, right side, 15.5F 19cm tip to cuff by IR 4/11    #HTN - controlled  - on amlodipine at home  - monitor off BP meds for now    #HLD  - Held fenofibrate due to CKD  - c/w lipitor 80mg daiy    #CAD  - c/w lipitor 80mg daiy  - c/w ASA 81mg daily  - c/w plavix 75mg daily  - c/w metoprolol tart 25mg BID    #CORAZON   - cont multivitamin     #Misc   Diet: DASH/CC   Activity: IAT   GI ppx: PPI   DVT: ppx heparin   Code status: Full code    Dispo: Acute pending euvolemia, cath today

## 2022-04-12 NOTE — PROGRESS NOTE ADULT - SUBJECTIVE AND OBJECTIVE BOX
Nephrology progress note    THIS IS AN INCOMPLETE NOTE . FULL NOTE TO FOLLOW SHORTLY    Patient is seen and examined, events over the last 24 h noted .    Allergies:  sulfa drugs (Anaphylaxis; Swelling)    Hospital Medications:   MEDICATIONS  (STANDING):  aspirin  chewable 81 milliGRAM(s) Oral daily  atorvastatin 80 milliGRAM(s) Oral at bedtime  clopidogrel Tablet 75 milliGRAM(s) Oral daily  furosemide   Injectable 80 milliGRAM(s) IV Push two times a day  loratadine 10 milliGRAM(s) Oral daily  metolazone 5 milliGRAM(s) Oral daily  metoprolol tartrate 25 milliGRAM(s) Oral two times a day  multivitamin 1 Tablet(s) Oral daily  NIFEdipine XL 30 milliGRAM(s) Oral every 24 hours  pantoprazole    Tablet 40 milliGRAM(s) Oral before breakfast        VITALS:  T(F): 96.4 (04-12-22 @ 05:07), Max: 97.6 (04-11-22 @ 19:35)  HR: 75 (04-12-22 @ 05:07)  BP: 150/70 (04-12-22 @ 05:07)  RR: 18 (04-12-22 @ 05:07)  SpO2: --  Wt(kg): --    04-10 @ 07:01  -  04-11 @ 07:00  --------------------------------------------------------  IN: 836 mL / OUT: 2920 mL / NET: -2084 mL    04-11 @ 07:01  -  04-12 @ 07:00  --------------------------------------------------------  IN: 380 mL / OUT: 3190 mL / NET: -2810 mL          PHYSICAL EXAM:  Constitutional: NAD  HEENT: anicteric sclera, oropharynx clear, MMM  Neck: No JVD  Respiratory: CTAB, no wheezes, rales or rhonchi  Cardiovascular: S1, S2, RRR  Gastrointestinal: BS+, soft, NT/ND  Extremities: No cyanosis or clubbing. No peripheral edema  :  No albarado.   Skin: No rashes    LABS:  04-12    140  |  98  |  53<H>  ----------------------------<  115<H>  4.0   |  28  |  3.7<H>    Ca    9.2      12 Apr 2022 07:40  Phos  4.4     04-11  Mg     2.0     04-12    TPro  6.1  /  Alb  4.0  /  TBili  0.5  /  DBili      /  AST  25  /  ALT  16  /  AlkPhos  45  04-12                          10.7   8.15  )-----------( 163      ( 12 Apr 2022 07:40 )             33.2       Urine Studies:        Iron 70, TIBC 296, %sat 24      [04-11-22 @ 15:20]  Ferritin 1351      [04-09-22 @ 04:30]  PTH -- (Ca 9.3)      [04-11-22 @ 05:21]   102  Lipid: chol 101, TG 96, HDL 41, LDL --      [04-07-22 @ 05:30]    HBsAb <3.0      [04-07-22 @ 15:34]  HBsAb Nonreact      [04-07-22 @ 15:34]  HBsAg Nonreact      [04-07-22 @ 15:34]  HBcAb Nonreact      [04-07-22 @ 15:34]        RADIOLOGY & ADDITIONAL STUDIES:   Nephrology progress note  Patient is seen and examined, events over the last 24 h noted .  Lying in bed comfortable     Allergies:  sulfa drugs (Anaphylaxis; Swelling)    Hospital Medications:   MEDICATIONS  (STANDING):  aspirin  chewable 81 milliGRAM(s) Oral daily  atorvastatin 80 milliGRAM(s) Oral at bedtime  clopidogrel Tablet 75 milliGRAM(s) Oral daily  furosemide   Injectable 80 milliGRAM(s) IV Push two times a day  loratadine 10 milliGRAM(s) Oral daily  metolazone 5 milliGRAM(s) Oral daily  metoprolol tartrate 25 milliGRAM(s) Oral two times a day  multivitamin 1 Tablet(s) Oral daily  NIFEdipine XL 30 milliGRAM(s) Oral every 24 hours  pantoprazole    Tablet 40 milliGRAM(s) Oral before breakfast        VITALS:  T(F): 96.4 (04-12-22 @ 05:07), Max: 97.6 (04-11-22 @ 19:35)  HR: 75 (04-12-22 @ 05:07)  BP: 150/70 (04-12-22 @ 05:07)  RR: 18 (04-12-22 @ 05:07)      04-10 @ 07:01  -  04-11 @ 07:00  --------------------------------------------------------  IN: 836 mL / OUT: 2920 mL / NET: -2084 mL    04-11 @ 07:01  -  04-12 @ 07:00  --------------------------------------------------------  IN: 380 mL / OUT: 3190 mL / NET: -2810 mL          PHYSICAL EXAM:  Constitutional: NAD  Neck: No JVD  Respiratory: CTAB,   Cardiovascular: S1, S2, RRR  Gastrointestinal: BS+, soft, NT/ND  Extremities: No cyanosis or clubbing. No peripheral edema  :  No albarado.   Skin: No rashes    LABS:  04-12    140  |  98  |  53<H>  ----------------------------<  115<H>  4.0   |  28  |  3.7<H>    Ca    9.2      12 Apr 2022 07:40  Phos  4.4     04-11  Mg     2.0     04-12    TPro  6.1  /  Alb  4.0  /  TBili  0.5  /  DBili      /  AST  25  /  ALT  16  /  AlkPhos  45  04-12                          10.7   8.15  )-----------( 163      ( 12 Apr 2022 07:40 )             33.2       Urine Studies:        Iron 70, TIBC 296, %sat 24      [04-11-22 @ 15:20]  Ferritin 1351      [04-09-22 @ 04:30]  PTH -- (Ca 9.3)      [04-11-22 @ 05:21]   102  Lipid: chol 101, TG 96, HDL 41, LDL --      [04-07-22 @ 05:30]    HBsAb <3.0      [04-07-22 @ 15:34]  HBsAb Nonreact      [04-07-22 @ 15:34]  HBsAg Nonreact      [04-07-22 @ 15:34]  HBcAb Nonreact      [04-07-22 @ 15:34]        RADIOLOGY & ADDITIONAL STUDIES:

## 2022-04-12 NOTE — PROGRESS NOTE ADULT - SUBJECTIVE AND OBJECTIVE BOX
FALLONDAWSON  71y Male    CHIEF COMPLAINT:    Patient is a 71y old  Male who presents with a chief complaint of SOB (2022 22:10)      INTERVAL HPI/OVERNIGHT EVENTS:    Patient seen and examined. Sitting in a chair. S/P HD yesterday. Denies sob at rest. No orthopnea. Scheduled for cath today    ROS: All other systems are negative.    Vital Signs:    T(F): 96.4 (22 @ 05:07), Max: 97.6 (22 @ 19:35)  HR: 75 (22 @ 05:07) (75 - 87)  BP: 150/70 (22 @ 05:07) (133/59 - 157/76)  RR: 18 (22 @ 05:07) (18 - 20)  SpO2: --  I&O's Summary    2022 07:01  -  2022 07:00  --------------------------------------------------------  IN: 380 mL / OUT: 3190 mL / NET: -2810 mL      Daily     Daily Weight in k.6 (2022 05:07)  CAPILLARY BLOOD GLUCOSE      POCT Blood Glucose.: 132 mg/dL (2022 07:53)  POCT Blood Glucose.: 140 mg/dL (2022 21:03)  POCT Blood Glucose.: 139 mg/dL (2022 17:30)      PHYSICAL EXAM:    GENERAL:  NAD  SKIN: No rashes or lesions  HENT: Atraumatic. Normocephalic. PERRL. Moist membranes.  NECK: Supple, No JVD. No lymphadenopathy.  PULMONARY: Decreased BS in the bases B/L. No wheezing. No rales  CVS: Normal S1, S2. Rate and Rhythm are regular. No murmurs.  ABDOMEN/GI: Soft, Nontender, Nondistended; BS present  EXTREMITIES: Peripheral pulses intact. 1+ pitting edema B/L LE.  NEUROLOGIC:  No motor or sensory deficit.  PSYCH: Alert & oriented x 3    Consultant(s) Notes Reviewed:  [x ] YES  [ ] NO  Care Discussed with Consultants/Other Providers [ x] YES  [ ] NO    EKG reviewed  Telemetry reviewed    LABS:                        10.7   8.15  )-----------( 163      ( 2022 07:40 )             33.2     04-    140  |  98  |  53<H>  ----------------------------<  115<H>  4.0   |  28  |  3.7<H>    Ca    9.2      2022 07:40  Phos  4.4     04-11  Mg     2.0         TPro  6.1  /  Alb  4.0  /  TBili  0.5  /  DBili  x   /  AST  25  /  ALT  16  /  AlkPhos  45  -12    PT/INR - ( 2022 05:21 )   PT: 12.20 sec;   INR: 1.06 ratio         PTT - ( 2022 05:21 )  PTT:47.6 sec  Serum Pro-Brain Natriuretic Peptide: 69106 pg/mL (22 @ 15:50)          RADIOLOGY & ADDITIONAL TESTS:      Imaging or report Personally Reviewed:  [ ] YES  [ ] NO    Medications:  Standing  aspirin  chewable 81 milliGRAM(s) Oral daily  atorvastatin 80 milliGRAM(s) Oral at bedtime  clopidogrel Tablet 75 milliGRAM(s) Oral daily  furosemide   Injectable 80 milliGRAM(s) IV Push two times a day  loratadine 10 milliGRAM(s) Oral daily  metolazone 5 milliGRAM(s) Oral daily  metoprolol tartrate 25 milliGRAM(s) Oral two times a day  multivitamin 1 Tablet(s) Oral daily  NIFEdipine XL 30 milliGRAM(s) Oral every 24 hours  pantoprazole    Tablet 40 milliGRAM(s) Oral before breakfast    PRN Meds  acetaminophen     Tablet .. 650 milliGRAM(s) Oral every 6 hours PRN  melatonin 3 milliGRAM(s) Oral at bedtime PRN  nitroglycerin     SubLingual 0.4 milliGRAM(s) SubLingual every 5 minutes PRN      Case discussed with resident    Care discussed with pt/family

## 2022-04-13 ENCOUNTER — TRANSCRIPTION ENCOUNTER (OUTPATIENT)
Age: 72
End: 2022-04-13

## 2022-04-13 LAB
ALBUMIN SERPL ELPH-MCNC: 3.8 G/DL — SIGNIFICANT CHANGE UP (ref 3.5–5.2)
ALP SERPL-CCNC: 49 U/L — SIGNIFICANT CHANGE UP (ref 30–115)
ALT FLD-CCNC: 19 U/L — SIGNIFICANT CHANGE UP (ref 0–41)
ANION GAP SERPL CALC-SCNC: 14 MMOL/L — SIGNIFICANT CHANGE UP (ref 7–14)
AST SERPL-CCNC: 37 U/L — SIGNIFICANT CHANGE UP (ref 0–41)
BASOPHILS # BLD AUTO: 0.04 K/UL — SIGNIFICANT CHANGE UP (ref 0–0.2)
BASOPHILS NFR BLD AUTO: 0.5 % — SIGNIFICANT CHANGE UP (ref 0–1)
BILIRUB SERPL-MCNC: 0.4 MG/DL — SIGNIFICANT CHANGE UP (ref 0.2–1.2)
BUN SERPL-MCNC: 61 MG/DL — CRITICAL HIGH (ref 10–20)
CALCIUM SERPL-MCNC: 9.4 MG/DL — SIGNIFICANT CHANGE UP (ref 8.5–10.1)
CHLORIDE SERPL-SCNC: 97 MMOL/L — LOW (ref 98–110)
CO2 SERPL-SCNC: 28 MMOL/L — SIGNIFICANT CHANGE UP (ref 17–32)
CREAT SERPL-MCNC: 4.5 MG/DL — CRITICAL HIGH (ref 0.7–1.5)
EGFR: 13 ML/MIN/1.73M2 — LOW
EOSINOPHIL # BLD AUTO: 0.31 K/UL — SIGNIFICANT CHANGE UP (ref 0–0.7)
EOSINOPHIL NFR BLD AUTO: 3.9 % — SIGNIFICANT CHANGE UP (ref 0–8)
GLUCOSE BLDC GLUCOMTR-MCNC: 135 MG/DL — HIGH (ref 70–99)
GLUCOSE BLDC GLUCOMTR-MCNC: 159 MG/DL — HIGH (ref 70–99)
GLUCOSE BLDC GLUCOMTR-MCNC: 160 MG/DL — HIGH (ref 70–99)
GLUCOSE SERPL-MCNC: 144 MG/DL — HIGH (ref 70–99)
HCT VFR BLD CALC: 36.2 % — LOW (ref 42–52)
HGB BLD-MCNC: 11.6 G/DL — LOW (ref 14–18)
IMM GRANULOCYTES NFR BLD AUTO: 0.4 % — HIGH (ref 0.1–0.3)
LYMPHOCYTES # BLD AUTO: 0.64 K/UL — LOW (ref 1.2–3.4)
LYMPHOCYTES # BLD AUTO: 8.1 % — LOW (ref 20.5–51.1)
MAGNESIUM SERPL-MCNC: 2 MG/DL — SIGNIFICANT CHANGE UP (ref 1.8–2.4)
MCHC RBC-ENTMCNC: 26.5 PG — LOW (ref 27–31)
MCHC RBC-ENTMCNC: 32 G/DL — SIGNIFICANT CHANGE UP (ref 32–37)
MCV RBC AUTO: 82.8 FL — SIGNIFICANT CHANGE UP (ref 80–94)
MONOCYTES # BLD AUTO: 0.9 K/UL — HIGH (ref 0.1–0.6)
MONOCYTES NFR BLD AUTO: 11.4 % — HIGH (ref 1.7–9.3)
NEUTROPHILS # BLD AUTO: 5.95 K/UL — SIGNIFICANT CHANGE UP (ref 1.4–6.5)
NEUTROPHILS NFR BLD AUTO: 75.7 % — HIGH (ref 42.2–75.2)
NRBC # BLD: 0 /100 WBCS — SIGNIFICANT CHANGE UP (ref 0–0)
PLATELET # BLD AUTO: 182 K/UL — SIGNIFICANT CHANGE UP (ref 130–400)
POTASSIUM SERPL-MCNC: 4.4 MMOL/L — SIGNIFICANT CHANGE UP (ref 3.5–5)
POTASSIUM SERPL-SCNC: 4.4 MMOL/L — SIGNIFICANT CHANGE UP (ref 3.5–5)
PROT SERPL-MCNC: 6.1 G/DL — SIGNIFICANT CHANGE UP (ref 6–8)
RBC # BLD: 4.37 M/UL — LOW (ref 4.7–6.1)
RBC # FLD: 15.5 % — HIGH (ref 11.5–14.5)
SODIUM SERPL-SCNC: 139 MMOL/L — SIGNIFICANT CHANGE UP (ref 135–146)
WBC # BLD: 7.87 K/UL — SIGNIFICANT CHANGE UP (ref 4.8–10.8)
WBC # FLD AUTO: 7.87 K/UL — SIGNIFICANT CHANGE UP (ref 4.8–10.8)

## 2022-04-13 PROCEDURE — 99232 SBSQ HOSP IP/OBS MODERATE 35: CPT

## 2022-04-13 PROCEDURE — 93010 ELECTROCARDIOGRAM REPORT: CPT

## 2022-04-13 PROCEDURE — 78472 GATED HEART PLANAR SINGLE: CPT | Mod: 26

## 2022-04-13 RX ORDER — TICAGRELOR 90 MG/1
1 TABLET ORAL
Qty: 60 | Refills: 3
Start: 2022-04-13 | End: 2022-08-10

## 2022-04-13 RX ORDER — FUROSEMIDE 40 MG
1 TABLET ORAL
Qty: 30 | Refills: 0
Start: 2022-04-13 | End: 2022-05-12

## 2022-04-13 RX ORDER — CLOPIDOGREL BISULFATE 75 MG/1
1 TABLET, FILM COATED ORAL
Qty: 0 | Refills: 0 | DISCHARGE

## 2022-04-13 RX ORDER — FUROSEMIDE 40 MG
1 TABLET ORAL
Qty: 0 | Refills: 0 | DISCHARGE

## 2022-04-13 RX ORDER — ATORVASTATIN CALCIUM 80 MG/1
1 TABLET, FILM COATED ORAL
Qty: 30 | Refills: 0
Start: 2022-04-13 | End: 2022-05-12

## 2022-04-13 RX ADMIN — Medication 1 TABLET(S): at 11:36

## 2022-04-13 RX ADMIN — TICAGRELOR 90 MILLIGRAM(S): 90 TABLET ORAL at 05:38

## 2022-04-13 RX ADMIN — Medication 25 MILLIGRAM(S): at 05:38

## 2022-04-13 RX ADMIN — TICAGRELOR 90 MILLIGRAM(S): 90 TABLET ORAL at 17:41

## 2022-04-13 RX ADMIN — Medication 80 MILLIGRAM(S): at 17:40

## 2022-04-13 RX ADMIN — Medication 30 MILLIGRAM(S): at 21:32

## 2022-04-13 RX ADMIN — ATORVASTATIN CALCIUM 80 MILLIGRAM(S): 80 TABLET, FILM COATED ORAL at 21:32

## 2022-04-13 RX ADMIN — PANTOPRAZOLE SODIUM 40 MILLIGRAM(S): 20 TABLET, DELAYED RELEASE ORAL at 06:00

## 2022-04-13 RX ADMIN — Medication 81 MILLIGRAM(S): at 11:36

## 2022-04-13 RX ADMIN — LORATADINE 10 MILLIGRAM(S): 10 TABLET ORAL at 13:55

## 2022-04-13 RX ADMIN — Medication 25 MILLIGRAM(S): at 17:39

## 2022-04-13 RX ADMIN — Medication 80 MILLIGRAM(S): at 05:38

## 2022-04-13 NOTE — PROGRESS NOTE ADULT - ASSESSMENT
Pt with CKD stage 5, DM for 15 years (was on Ozempic), HTN, CAD, HFrEF, admitted with fluid overload.  # CKD 5 -  started on  HD for fluid overload, advanced CKD  # Etiology of CKD  - likely diabetic nephropathy  # ADHF / HFrEF    - for HD today 3h UF 1.5 l if tolerates   - cont IV Lasix 80 q12- metolazone / non oliguric   - cardiology notes appreciated / sp cath and PCI RCA   - pt had a nephrotic range proteinuria 5 g/g with negative w/u for myeloma, vasculitis APL2R Ab  - ph at goal   - no iv iron d/t high ferritin, start SINCERE if hgb down-trending- no urgent need now   - had a Kidney Tx eval at [place]  - asymmetric edema on LE L>R,  s/p LE Duplex, no DVT  - will need a HD spot at [facility]     - will follow

## 2022-04-13 NOTE — DISCHARGE NOTE PROVIDER - CARE PROVIDERS DIRECT ADDRESSES
,DirectAddress_Unknown,DirectAddress_Unknown,[email or web address]

## 2022-04-13 NOTE — DISCHARGE NOTE PROVIDER - PROVIDER TOKENS
PROVIDER:[TOKEN:[[id number]],FOLLOWUP:[2 weeks],ESTABLISHEDPATIENT:[T]],PROVIDER:[TOKEN:[[id number]],FOLLOWUP:[2 weeks],ESTABLISHEDPATIENT:[T]],PROVIDER:[TOKEN:[[id number]],FOLLOWUP:[2 weeks],ESTABLISHEDPATIENT:[T]]

## 2022-04-13 NOTE — PROGRESS NOTE ADULT - ASSESSMENT
A 71 years old male presented to the ED with progressively worsening sob for the last one week. Also c/o LE swelling and orthopnea.     Hb: 10.4   BUN/Cr: 82/5.2  Pro BNP: 42201  CXR: B/L pleural effusion. Increase intersitial markings. (Check official report)  Trop: 0.05-->0.04      1. Acute HFmrEF  2. DM-2 / HTN / DL  3. CKD-5. On HD now  4. Anemia of chronic disease  5. CAD / Abnormal stress test             PLAN:    ·	Cont tele  ·	Scheduled for cath today  ·	S/P TDC placement and HD yesterday  ·	Cont Lasix 80 mg ivp q 12h and Metolazone 5 mg po daily  ·	Check i's and o's and daily wt.   ·	Low salt diet and water restriction to 1.5 L/D  ·	ECHO showed EF is 45-50%. Moderate MR  ·	Cont ASA, Plavix and Metoprolol  ·	Monitor FS  ·	Cont Metoprolol and his other home meds.   ·	Venous doppler of the LE is negative for DVT    Progress Note Handoff    Pending (specify):  Consults_________, Tests__, Test Results_______, Other__Cath today _______  Family discussion:  Disposition: Home___/SNF___/Other________/Unknown at this time________    Sebastien Warren MD  Spectra: [phone]     A 71 years old male presented to the ED with progressively worsening sob for the last one week. Also c/o LE swelling and orthopnea.     Hb: 10.4   BUN/Cr: 82/5.2  Pro BNP: 80708  CXR: B/L pleural effusion. Increase intersitial markings. (Check official report)  Trop: 0.05-->0.04      1. Acute HFmrEF  2. DM-2 / HTN / DL  3. CKD-5. On HD now  4. Anemia of chronic disease  5. CAD / Abnormal stress test             PLAN:    ·	S/P cath and KRISHAN of distal LAD on 4/12  ·	Cont ASA, Ticagrelor, Metoprolol and Lipitor  ·	S/P HD today. Euvolemic.   ·	Cont Lasix 80 mg ivp q 12h and Metolazone 5 mg po daily. On d/c Lasix 80 mg po daily  ·	Check i's and o's and daily wt.   ·	Low salt diet and water restriction to 1.5 L/D  ·	ECHO showed EF is 45-50%. Moderate MR  ·	Monitor FS. On no meds. Sugars are stable  ·	Venous doppler of the LE is negative for DVT  ·	Waiting for HD slot. D/W the .     Progress Note Handoff    Pending (specify):  Consults_________, Tests__, Test Results_______, Other__Social services for d/c planning. _______  Family discussion:  Disposition: Home___/SNF___/Other________/Unknown at this time________    Sebastien Warren MD  Spectra: [phone]

## 2022-04-13 NOTE — DISCHARGE NOTE PROVIDER - NSDCMRMEDTOKEN_GEN_ALL_CORE_FT
alfuzosin 10 mg oral tablet, extended release: 1 tab(s) orally once a day  Aranesp 500 mcg/mL injectable solution: every 3 weeks  aspirin 81 mg oral delayed release capsule: 1 tab(s) orally once a day  Claritin 10 mg oral tablet: 1 tab(s) orally once a day  ezetimibe 10 mg oral tablet: 1 tab(s) orally once a day  fenofibrate 160 mg oral tablet: 1 tab(s) orally once a day  Metoprolol Tartrate 25 mg oral tablet: 1 tab(s) orally 2 times a day  Multiple Vitamins oral tablet: 1 tab(s) orally once a day  NIFEdipine 30 mg oral tablet, extended release: 1 tab(s) orally once a day  Nitrostat 0.4 mg sublingual tablet: 1 tab(s) sublingual every 5 minutes, As Needed  Ozempic (1 mg dose) 4 mg/3 mL subcutaneous solution:   Pepcid 40 mg oral tablet: 1 tab(s) orally once a day (at bedtime)  rosuvastatin 20 mg oral tablet: 1 tab(s) orally once a day   alfuzosin 10 mg oral tablet, extended release: 1 tab(s) orally once a day  Aranesp 500 mcg/mL injectable solution: every 3 weeks  aspirin 81 mg oral delayed release capsule: 1 tab(s) orally once a day  atorvastatin 80 mg oral tablet: 1 tab(s) orally once a day (at bedtime)  Brilinta (ticagrelor) 90 mg oral tablet: 1 tab(s) orally 2 times a day MDD:2  Claritin 10 mg oral tablet: 1 tab(s) orally once a day  ezetimibe 10 mg oral tablet: 1 tab(s) orally once a day  fenofibrate 160 mg oral tablet: 1 tab(s) orally once a day  furosemide 80 mg oral tablet: 1 tab(s) orally once a day   lisinopril 5 mg oral tablet: 1 tab(s) orally once a day   lisinopril 5 mg oral tablet: 1 tab(s) orally once a day  metOLazone 5 mg oral tablet: 1 tab(s) orally once a day  Metoprolol Tartrate 25 mg oral tablet: 1 tab(s) orally 2 times a day  Multiple Vitamins oral tablet: 1 tab(s) orally once a day  Nitrostat 0.4 mg sublingual tablet: 1 tab(s) sublingual every 5 minutes, As Needed  Ozempic (1 mg dose) 4 mg/3 mL subcutaneous solution:   Pepcid 40 mg oral tablet: 1 tab(s) orally once a day (at bedtime)  rosuvastatin 20 mg oral tablet: 1 tab(s) orally once a day

## 2022-04-13 NOTE — PROGRESS NOTE ADULT - SUBJECTIVE AND OBJECTIVE BOX
Nephrology progress note  Patient is seen and examined, events over the last 24 h noted .  lying in bed comfortable  sp cath yesterday     Allergies:  sulfa drugs (Anaphylaxis; Swelling)    Hospital Medications:   MEDICATIONS  (STANDING):  aspirin  chewable 81 milliGRAM(s) Oral daily  atorvastatin 80 milliGRAM(s) Oral at bedtime  furosemide   Injectable 80 milliGRAM(s) IV Push two times a day  loratadine 10 milliGRAM(s) Oral daily  metolazone 5 milliGRAM(s) Oral daily  metoprolol tartrate 25 milliGRAM(s) Oral two times a day  multivitamin 1 Tablet(s) Oral daily  NIFEdipine XL 30 milliGRAM(s) Oral every 24 hours  pantoprazole    Tablet 40 milliGRAM(s) Oral before breakfast  ticagrelor 90 milliGRAM(s) Oral every 12 hours        VITALS:  T(F): 96.7 (04-13-22 @ 05:00), Max: 97.6 (04-12-22 @ 13:32)  HR: 72 (04-13-22 @ 08:10)  BP: 164/80 (04-13-22 @ 08:10)  RR: 18 (04-13-22 @ 08:10)      04-11 @ 07:01  -  04-12 @ 07:00  --------------------------------------------------------  IN: 380 mL / OUT: 3190 mL / NET: -2810 mL    04-12 @ 07:01  -  04-13 @ 07:00  --------------------------------------------------------  IN: 720 mL / OUT: 1200 mL / NET: -480 mL          PHYSICAL EXAM:  Constitutional: NAD  Neck: No JVD  Respiratory: CTAB,   Cardiovascular: S1, S2, RRR  Gastrointestinal: BS+, soft, NT/ND  Extremities: No cyanosis or clubbing. No peripheral edema  :  No albarado.   Skin: No rashes    LABS:  04-13    139  |  97<L>  |  61<HH>  ----------------------------<  144<H>  4.4   |  28  |  4.5<HH>    Ca    9.4      13 Apr 2022 04:30  Mg     2.0     04-13    TPro  6.1  /  Alb  3.8  /  TBili  0.4  /  DBili      /  AST  37  /  ALT  19  /  AlkPhos  49  04-13                          11.6   7.87  )-----------( 182      ( 13 Apr 2022 04:30 )             36.2       Urine Studies:        Iron 70, TIBC 296, %sat 24      [04-11-22 @ 15:20]  Ferritin 1351      [04-09-22 @ 04:30]  PTH -- (Ca 9.3)      [04-11-22 @ 05:21]   102  Lipid: chol 101, TG 96, HDL 41, LDL --      [04-07-22 @ 05:30]    HBsAb <3.0      [04-07-22 @ 15:34]  HBsAb Nonreact      [04-07-22 @ 15:34]  HBsAg Nonreact      [04-07-22 @ 15:34]  HBcAb Nonreact      [04-07-22 @ 15:34]        RADIOLOGY & ADDITIONAL STUDIES:

## 2022-04-13 NOTE — DISCHARGE NOTE PROVIDER - CARE PROVIDER_API CALL
Mauri Cervantes)  Family Medicine  [address] [place] [address]  Phone: [phone]  Fax: [phone]  Established Patient  Follow Up Time: 2 weeks    Janette Douglas)  Nephrology  [address] [place] [address]  Phone: [phone]  Fax: [phone]  Established Patient  Follow Up Time: 2 weeks    Vidal Calvillo)  Cardiovascular Disease; Internal Medicine; Interventional Cardiology  [address] [place] [address]  Phone: [phone]  Fax: [phone]  Established Patient  Follow Up Time: 2 weeks

## 2022-04-13 NOTE — PROGRESS NOTE ADULT - SUBJECTIVE AND OBJECTIVE BOX
VASYLDAWSON  71y Male    CHIEF COMPLAINT:    Patient is a 71y old  Male who presents with a chief complaint of SOB (2022 14:20)      INTERVAL HPI/OVERNIGHT EVENTS:    Patient seen and examined.    ROS: All other systems are negative.    Vital Signs:    T(F): 96.7 (22 @ 05:00), Max: 97.6 (22 @ 13:32)  HR: 72 (22 @ 08:10) (72 - 82)  BP: 164/80 (22 @ 08:10) (164/80 - 178/84)  RR: 18 (22 @ 08:10) (18 - 20)  SpO2: --  I&O's Summary    2022 07:01  -  2022 07:00  --------------------------------------------------------  IN: 720 mL / OUT: 1200 mL / NET: -480 mL      Daily     Daily Weight in k.2 (2022 05:00)  CAPILLARY BLOOD GLUCOSE      POCT Blood Glucose.: 135 mg/dL (2022 07:37)  POCT Blood Glucose.: 147 mg/dL (2022 22:14)  POCT Blood Glucose.: 118 mg/dL (2022 11:33)      PHYSICAL EXAM:    GENERAL:  NAD  SKIN: No rashes or lesions  HENT: Atraumatic. Normocephalic. PERRL. Moist membranes.  NECK: Supple, No JVD. No lymphadenopathy.  PULMONARY: CTA B/L. No wheezing. No rales  CVS: Normal S1, S2. Rate and Rhythm are regular. No murmurs.  ABDOMEN/GI: Soft, Nontender, Nondistended; BS present  EXTREMITIES: Peripheral pulses intact. No edema B/L LE.  NEUROLOGIC:  No motor or sensory deficit.  PSYCH: Alert & oriented x 3    Consultant(s) Notes Reviewed:  [x ] YES  [ ] NO  Care Discussed with Consultants/Other Providers [ x] YES  [ ] NO    EKG reviewed  Telemetry reviewed    LABS:                        11.6   7.87  )-----------( 182      ( 2022 04:30 )             36.2         139  |  97<L>  |  61<HH>  ----------------------------<  144<H>  4.4   |  28  |  4.5<HH>    Ca    9.4      2022 04:30  Mg     2.0         TPro  6.1  /  Alb  3.8  /  TBili  0.4  /  DBili  x   /  AST  37  /  ALT  19  /  AlkPhos  49        Serum Pro-Brain Natriuretic Peptide: 11372 pg/mL (22 @ 15:50)          RADIOLOGY & ADDITIONAL TESTS:      Imaging or report Personally Reviewed:  [ ] YES  [ ] NO    Medications:  Standing  aspirin  chewable 81 milliGRAM(s) Oral daily  atorvastatin 80 milliGRAM(s) Oral at bedtime  furosemide   Injectable 80 milliGRAM(s) IV Push two times a day  loratadine 10 milliGRAM(s) Oral daily  metolazone 5 milliGRAM(s) Oral daily  metoprolol tartrate 25 milliGRAM(s) Oral two times a day  multivitamin 1 Tablet(s) Oral daily  NIFEdipine XL 30 milliGRAM(s) Oral every 24 hours  pantoprazole    Tablet 40 milliGRAM(s) Oral before breakfast  ticagrelor 90 milliGRAM(s) Oral every 12 hours    PRN Meds  acetaminophen     Tablet .. 650 milliGRAM(s) Oral every 6 hours PRN  melatonin 3 milliGRAM(s) Oral at bedtime PRN  nitroglycerin     SubLingual 0.4 milliGRAM(s) SubLingual every 5 minutes PRN      Case discussed with resident    Care discussed with pt/family           DAWSON FALLON  71y Male    CHIEF COMPLAINT:    Patient is a 71y old  Male who presents with a chief complaint of SOB (2022 14:20)      INTERVAL HPI/OVERNIGHT EVENTS:    Patient seen and examined. S/P HD today. No sob. No CP    ROS: All other systems are negative.    Vital Signs:    T(F): 96.7 (22 @ 05:00), Max: 97.6 (22 @ 13:32)  HR: 72 (22 @ 08:10) (72 - 82)  BP: 164/80 (22 @ 08:10) (164/80 - 178/84)  RR: 18 (22 @ 08:10) (18 - 20)  SpO2: --  I&O's Summary    2022 07:01  -  2022 07:00  --------------------------------------------------------  IN: 720 mL / OUT: 1200 mL / NET: -480 mL      Daily     Daily Weight in k.2 (2022 05:00)  CAPILLARY BLOOD GLUCOSE      POCT Blood Glucose.: 135 mg/dL (2022 07:37)  POCT Blood Glucose.: 147 mg/dL (2022 22:14)  POCT Blood Glucose.: 118 mg/dL (2022 11:33)      PHYSICAL EXAM:    GENERAL:  NAD  SKIN: No rashes or lesions  HENT: Atraumatic. Normocephalic. PERRL. Moist membranes.  NECK: Supple, No JVD. No lymphadenopathy.  PULMONARY: CTA B/L. No wheezing. No rales  CVS: Normal S1, S2. Rate and Rhythm are regular. No murmurs.  ABDOMEN/GI: Soft, Nontender, Nondistended; BS present  EXTREMITIES: Peripheral pulses intact. No edema B/L LE.  NEUROLOGIC:  No motor or sensory deficit.  PSYCH: Alert & oriented x 3    Consultant(s) Notes Reviewed:  [x ] YES  [ ] NO  Care Discussed with Consultants/Other Providers [ x] YES  [ ] NO    EKG reviewed  Telemetry reviewed    LABS:                        11.6   7.87  )-----------( 182      ( 2022 04:30 )             36.2     04-    139  |  97<L>  |  61<HH>  ----------------------------<  144<H>  4.4   |  28  |  4.5<HH>    Ca    9.4      2022 04:30  Mg     2.0         TPro  6.1  /  Alb  3.8  /  TBili  0.4  /  DBili  x   /  AST  37  /  ALT  19  /  AlkPhos  49        Serum Pro-Brain Natriuretic Peptide: 02432 pg/mL (22 @ 15:50)          RADIOLOGY & ADDITIONAL TESTS:      Imaging or report Personally Reviewed:  [ ] YES  [ ] NO    Medications:  Standing  aspirin  chewable 81 milliGRAM(s) Oral daily  atorvastatin 80 milliGRAM(s) Oral at bedtime  furosemide   Injectable 80 milliGRAM(s) IV Push two times a day  loratadine 10 milliGRAM(s) Oral daily  metolazone 5 milliGRAM(s) Oral daily  metoprolol tartrate 25 milliGRAM(s) Oral two times a day  multivitamin 1 Tablet(s) Oral daily  NIFEdipine XL 30 milliGRAM(s) Oral every 24 hours  pantoprazole    Tablet 40 milliGRAM(s) Oral before breakfast  ticagrelor 90 milliGRAM(s) Oral every 12 hours    PRN Meds  acetaminophen     Tablet .. 650 milliGRAM(s) Oral every 6 hours PRN  melatonin 3 milliGRAM(s) Oral at bedtime PRN  nitroglycerin     SubLingual 0.4 milliGRAM(s) SubLingual every 5 minutes PRN      Case discussed with resident    Care discussed with pt/family

## 2022-04-13 NOTE — DISCHARGE NOTE PROVIDER - NSDCCPCAREPLAN_GEN_ALL_CORE_FT
PRINCIPAL DISCHARGE DIAGNOSIS  Diagnosis: CHF exacerbation  Assessment and Plan of Treatment: You presented to the hospital for shortness of breath. While in the hospital, echocardiogram of your heart showed decreased heart function. You were seen by your cardiologist and nephrologist. You were started on dialysis while in the hospital. Because your AV fistula infiltrated, a new dialysis port was inserted. Please follow up with your nephrologist, cardiologist and primary care provider upon discharged.      SECONDARY DISCHARGE DIAGNOSES  Diagnosis: CAD (coronary artery disease)  Assessment and Plan of Treatment: During your admission, you were taken to the cath lab by your cardiologist. You were found to have blockage of one of your heart arteries. A stent was placed. Please take your meds as prescribed and follow up with your cardiologist.     PRINCIPAL DISCHARGE DIAGNOSIS  Diagnosis: CHF exacerbation  Assessment and Plan of Treatment: You presented to the hospital for shortness of breath. While in the hospital, echocardiogram of your heart showed decreased heart function. You were seen by your cardiologist and nephrologist. You were started on dialysis while in the hospital. Because your AV fistula infiltrated, a new dialysis port was inserted. Please follow up with your nephrologist, cardiologist and primary care provider upon discharged.      SECONDARY DISCHARGE DIAGNOSES  Diagnosis: CAD (coronary artery disease)  Assessment and Plan of Treatment: During your admission, you were taken to the cath lab by your cardiologist. You were found to have blockage of one of your heart arteries. A stent was placed. Please take your meds as prescribed and follow up with your cardiologist.  Discharge instructions as follows, when ready to d/c:   - Continue medical regimen as prescribed to prevent chest pain   - Continue dual anti-platelet therapy, beta blocker, statin, ccb   - If you are diabetic and taking medication containing Metformin, do not take them for 48 hours after the procedure   - Instructed to call 911 if chest pain, shortness of breath or bleeding from access site    - No heavy lifting >10lbs x 1 week    - No driving x 24 hours    - No baths, swimming pools x 1 week, may shower    - Low sodium low fat low cholesterol diet     - Follow-up with Cardiologist in 1-2 weeks after discharge     PRINCIPAL DISCHARGE DIAGNOSIS  Diagnosis: CHF exacerbation  Assessment and Plan of Treatment: You presented to the hospital for shortness of breath. While in the hospital, echocardiogram of your heart showed decreased heart function. You were seen by your cardiologist and nephrologist. You were started on dialysis while in the hospital. Because your AV fistula infiltrated, a new dialysis port was inserted. Please follow up with your nephrologist, cardiologist and primary care provider upon discharged. You have outpatient hemodialysis spot on Friday 1:30 at [facility].      SECONDARY DISCHARGE DIAGNOSES  Diagnosis: CAD (coronary artery disease)  Assessment and Plan of Treatment: During your admission, you were taken to the cath lab by your cardiologist. You were found to have blockage of one of your heart arteries. A stent was placed. Please take your meds as prescribed and follow up with your cardiologist.  Discharge instructions as follows, when ready to d/c:   - Continue medical regimen as prescribed to prevent chest pain   - Continue dual anti-platelet therapy, beta blocker, statin, ccb   - If you are diabetic and taking medication containing Metformin, do not take them for 48 hours after the procedure   - Instructed to call 911 if chest pain, shortness of breath or bleeding from access site    - No heavy lifting >10lbs x 1 week    - No driving x 24 hours    - No baths, swimming pools x 1 week, may shower    - Low sodium low fat low cholesterol diet     - Follow-up with Cardiologist in 1-2 weeks after discharge

## 2022-04-13 NOTE — DISCHARGE NOTE PROVIDER - HOSPITAL COURSE
Pt is a 71 male with PMH HTN, HLD, MI, anemia , CKD5 presents to ED complaining of shortness of breath which started 3 weeks ago and has been getting progressively worse. Patient states that little activity gets him sob. He also noted having trouble lying down flat and has had increased LE swelling. Patient had recent echo 1 month ago at [facility] which showed significantly reduced LV function (EF 25-30%, G2DD, mild MR). Patient denies any fever, chest pain, palpitations, abdominal pain, nausea, vomiting, diarrhea, dysuria. Patient still has good urine output and has left upper extremity fistula not yet used.     In ED vitals: /62, HR 80, RR 20, T 97.7, Spo2 96% on RA.  Labs significant for BNP 23K, Trop 0.05, CXR looked congested    Patient was thus admitted for new onset CHF  While in the hospital, patient was started on dialysis for the first time via AVF. During subsequent session, patient's AVF infiltrated. Patient is was seen by IR and a TDC was inserted for continued dialysis. In addition, patient was continued on IV diuresis. During his hospital course patient was seen by cardiology who took the patient to cath. Patient is s/p PCI with KRISHAN in the distal RCA. Patient will be sent home with new [facility].     patient  is stable and appears euvolemic and ready for dc.

## 2022-04-13 NOTE — PROGRESS NOTE ADULT - SUBJECTIVE AND OBJECTIVE BOX
SUBJ:No chest pain or shortness of breath      MEDICATIONS  (STANDING):  aspirin  chewable 81 milliGRAM(s) Oral daily  atorvastatin 80 milliGRAM(s) Oral at bedtime  furosemide   Injectable 80 milliGRAM(s) IV Push two times a day  loratadine 10 milliGRAM(s) Oral daily  metolazone 5 milliGRAM(s) Oral daily  metoprolol tartrate 25 milliGRAM(s) Oral two times a day  multivitamin 1 Tablet(s) Oral daily  NIFEdipine XL 30 milliGRAM(s) Oral every 24 hours  pantoprazole    Tablet 40 milliGRAM(s) Oral before breakfast  ticagrelor 90 milliGRAM(s) Oral every 12 hours    MEDICATIONS  (PRN):  acetaminophen     Tablet .. 650 milliGRAM(s) Oral every 6 hours PRN Temp greater or equal to 38C (100.4F), Mild Pain (1 - 3)  melatonin 3 milliGRAM(s) Oral at bedtime PRN Insomnia  nitroglycerin     SubLingual 0.4 milliGRAM(s) SubLingual every 5 minutes PRN Chest Pain            Vital Signs Last 24 Hrs  T(C): 36.1 (13 Apr 2022 20:18), Max: 36.1 (13 Apr 2022 13:56)  T(F): 96.9 (13 Apr 2022 20:18), Max: 96.9 (13 Apr 2022 13:56)  HR: 88 (13 Apr 2022 20:18) (72 - 88)  BP: 118/56 (13 Apr 2022 20:18) (111/62 - 168/73)  BP(mean): --  RR: 18 (13 Apr 2022 20:18) (18 - 20)  SpO2: --      ECG:NML    TTE:    LABS:                        11.6   7.87  )-----------( 182      ( 13 Apr 2022 04:30 )             36.2     04-13    139  |  97<L>  |  61<HH>  ----------------------------<  144<H>  4.4   |  28  |  4.5<HH>    Ca    9.4      13 Apr 2022 04:30  Mg     2.0     04-13    TPro  6.1  /  Alb  3.8  /  TBili  0.4  /  DBili  x   /  AST  37  /  ALT  19  /  AlkPhos  49  04-13            I&O's Summary    12 Apr 2022 07:01  -  13 Apr 2022 07:00  --------------------------------------------------------  IN: 720 mL / OUT: 1200 mL / NET: -480 mL    13 Apr 2022 07:01  -  13 Apr 2022 21:46  --------------------------------------------------------  IN: 400 mL / OUT: 2400 mL / NET: -2000 mL      BNP

## 2022-04-13 NOTE — PROGRESS NOTE ADULT - SUBJECTIVE AND OBJECTIVE BOX
Cardiology Follow up    DAWSON FALLON   71y Male  PAST MEDICAL & SURGICAL HISTORY:  HTN (hypertension)    Kidney failure    Pneumonia, pneumococcal    Anemia    DM (diabetes mellitus)    Myocardial infarction    AV fistula    History of right common carotid artery stent placement    H/O colonoscopy  5-10 years ago         HPI:  Pt is a 71 male with PMH HTN, HLD, MI, anemia , CKD5 presents to ED complaining of shortness of breath which started 3 weeks ago and has been getting progressively worse. Patient states that little activity gets him sob. He also noted having trouble lying down flat and has had increased LE swelling. Patient had recent echo 1 month ago at [place] which showed significantly reduced LV function (EF 25-30%, G2DD, mild MR). Patient denies any fever, chest pain, palpitations, abdominal pain, nausea, vomiting, diarrhea, dysuria. Patient still has good urine output and has left upper extremity fistula not yet used.     In ED vitals: /62, HR 80, RR 20, T 97.7, Spo2 96% on RA.  Labs significant for BNP 23K, Trop 0.05, CXR looks congested - official read pending.  (06 Apr 2022 21:41)    Allergies    sulfa drugs (Anaphylaxis; Swelling)    Intolerances    Patient seen and examined at bedside. No acute events overnight.  Patient without complaints. Pt ambulated without issues/symptoms  Denies CP, SOB, palpitations, or dizziness  Sinus Tachycardia events on telemetry overnight    Vital Signs Last 24 Hrs  T(C): 35.9 (13 Apr 2022 05:00), Max: 36.4 (12 Apr 2022 13:32)  T(F): 96.7 (13 Apr 2022 05:00), Max: 97.6 (12 Apr 2022 13:32)  HR: 72 (13 Apr 2022 08:10) (72 - 82)  BP: 164/80 (13 Apr 2022 08:10) (164/80 - 178/84)  BP(mean): --  RR: 18 (13 Apr 2022 08:10) (18 - 20)  SpO2: --    MEDICATIONS  (STANDING):  aspirin  chewable 81 milliGRAM(s) Oral daily  atorvastatin 80 milliGRAM(s) Oral at bedtime  furosemide   Injectable 80 milliGRAM(s) IV Push two times a day  loratadine 10 milliGRAM(s) Oral daily  metolazone 5 milliGRAM(s) Oral daily  metoprolol tartrate 25 milliGRAM(s) Oral two times a day  multivitamin 1 Tablet(s) Oral daily  NIFEdipine XL 30 milliGRAM(s) Oral every 24 hours  pantoprazole    Tablet 40 milliGRAM(s) Oral before breakfast  ticagrelor 90 milliGRAM(s) Oral every 12 hours    MEDICATIONS  (PRN):  acetaminophen     Tablet .. 650 milliGRAM(s) Oral every 6 hours PRN Temp greater or equal to 38C (100.4F), Mild Pain (1 - 3)  melatonin 3 milliGRAM(s) Oral at bedtime PRN Insomnia  nitroglycerin     SubLingual 0.4 milliGRAM(s) SubLingual every 5 minutes PRN Chest Pain      REVIEW OF SYSTEMS:          All negative except as mentioned in HPI    PHYSICAL EXAM:           CONSTITUTIONAL: Well-developed; well-nourished; in no acute distress  	SKIN: warm, dry  	HEAD: Normocephalic; atraumatic  	EYES: PERRL.  	ENT: No nasal discharge, airway clear, mucous membranes moist  	NECK: Supple; non tender.  	CARD: +S1, +S2, no murmurs, gallops, or rubs. Regular rate and rhythm    	RESP: No wheezes, rales or rhonchi. CTA B/L  	ABD: soft ntnd, + BS x 4 quadrants  	EXT: moves all extremities,  no clubbing, cyanosis or edema  	NEURO: Alert and oriented x3, no focal deficits          PSYCH: Cooperative, appropriate          VASCULAR:  + Rad / + PTs / +  DPs          EXTREMITY:             Right Groin: dressing removed, access site soft, no hematoma, no pain, + pulses, no sign of infection, no numbness              ECG:   < from: 12 Lead ECG (04.13.22 @ 07:47) >  Ventricular Rate 76 BPM    Atrial Rate 76 BPM    P-R Interval 178 ms    QRS Duration 138 ms    Q-T Interval 482 ms    QTC Calculation(Bazett) 542 ms    P Axis 43 degrees    R Axis -81 degrees    T Axis 56 degrees    Diagnosis Line Normal sinus rhythm  Left axis deviation  Right bundle branch block  Minimal voltage criteria for LVH, may be normal variant  Abnormal ECG    Confirmed by GIANNA HSU, [facility] ([id number]) on 4/13/2022 8:42:53 AM                                                                                 2D ECHO:  < from: TTE Echo Complete w/o Contrast w/ Doppler (04.08.22 @ 15:54) >  Summary:   1. Left ventricular ejection fraction, by visual estimation, is 45 to   50%.   2. Mildly decreased global left ventricular systolic function.   3. Global cardiomyopathy.   4. Moderate to severely increased left ventricular internal cavity size.   5. Moderately increased LV wall thickness.   6. Mildly enlarged right atrium.   7. Moderately enlarged left atrium.   8. Mild thickening of the anterior and posterior mitral valve leaflets.   9. Moderate mitral valve regurgitation.  10. Mild tricuspid regurgitation.  11. Aortic valve is bicuspid.  12. Sclerotic aortic valve with normal opening.  13. Small patent foramen ovale, with predominantly right to left shunting   across the atrial septum.      LABS:                        11.6   7.87  )-----------( 182      ( 13 Apr 2022 04:30 )             36.2     04-13    139  |  97<L>  |  61<HH>  ----------------------------<  144<H>  4.4   |  28  |  4.5<HH>    Ca    9.4      13 Apr 2022 04:30  Mg     2.0     04-13    TPro  6.1  /  Alb  3.8  /  TBili  0.4  /  DBili  x   /  AST  37  /  ALT  19  /  AlkPhos  49  04-13    Magnesium, Serum: 2.0 mg/dL [1.8 - 2.4] (04-13-22 @ 04:30)  LIVER FUNCTIONS - ( 13 Apr 2022 04:30 )  Alb: 3.8 g/dL / Pro: 6.1 g/dL / ALK PHOS: 49 U/L / ALT: 19 U/L / AST: 37 U/L / GGT: x             A/P:  I discussed the case with Cardiologist Dr. Calvillo and recommend the following:    S/P PCI:   Intervention:   - successful balloon angioplasty, and IVUS guided PCI with KRISHAN x 1 in distal RCA    Implants:   - 4.0 x 16 mm Synergy KRISHAN x 1 in distal RCA     FINDINGS:     Coronary Dominance: right dominant      LM: normal    LAD: mild disease in prox and mid LAD, 40% stenosis of mid LAD  D1: minor luminal irregularities    LCX: mild disease  OM1: minor luminal irregularities    Ramus: minor luminal irregularities    RCA: mild disease of prox and mid RCA, 20% in-stent restenosis of prox RCA, 30% in-stent restenosis of mid RCA. 99% stenosis of distal RCA s/p PCI                          HD today as per Renal                    No ACEi/ARB due to elevated Cr                    Continue DAPT ( Aspirin 81 mg PO Daily and Brilinta 90 mg PO q12 hr  ), CCB, B-Blocker, Statin Therapy                   Keep K = 4, Mg = 2                   F/U MUGA scan results                    Ambulate around the unit with assistance, monitor right groin s/p Cardiac Cath site                    Patient pharmacy called in for Brilinta prescription and it is 23$ per month, patient is agreeing for it, medication is ready for a  today                    Plavix D/C in patient pharmacy                    Patient agreeing to take DAPT for at least one year or as directed by cardiologist                    Pt given instructions on importance of taking antiplatelet medication or risk acute stent thrombosis/death                   Post cath instructions, access site care and activity restrictions reviewed with patient                     Discussed with patient to return to hospital if experience chest pain, shortness breath, dizziness and site bleeding                   Aggressive risk factor modification, diet counseling, smoking cessation discussed with patient                       Can discharge patient from Interventional Cardiac standpoint after MUGA scan, if patient is ambulating without symptoms and access site wnl, ECG and blood work reviewed                    Benefits of Cardiac Rehab discussed with patient, All documents sent to [facility]. Patient instructed to call and make first                               appointment after first f/u visit with Cardiologist                    Follow up with Cardiology Dr. Calvillo regarding patient disposition. Patient instructed to call and make an appointment in  two weeks.                      Discharge instructions as follows, when ready to d/c:                   - Continue medical regimen as prescribed to prevent chest pain                   - Continue dual anti-platelet therapy, beta blocker, statin, ccb                   - If you are diabetic and taking medication containing Metformin, do not take them for 48 hours after the procedure                   - Instructed to call 911 if chest pain, shortness of breath or bleeding from access site                   - No heavy lifting >10lbs x 1 week                   - No driving x 24 hours                   - No baths, swimming pools x 1 week, may shower                   - Low sodium low fat low cholesterol diet                   - Follow-up with Cardiologist in 1-2 weeks after discharge                                        Cardiology Follow up    DAWSON FALLON   71y Male  PAST MEDICAL & SURGICAL HISTORY:  HTN (hypertension)    Kidney failure    Pneumonia, pneumococcal    Anemia    DM (diabetes mellitus)    Myocardial infarction    AV fistula    History of right common carotid artery stent placement    H/O colonoscopy  5-10 years ago         HPI:  Pt is a 71 male with PMH HTN, HLD, MI, anemia , CKD5 presents to ED complaining of shortness of breath which started 3 weeks ago and has been getting progressively worse. Patient states that little activity gets him sob. He also noted having trouble lying down flat and has had increased LE swelling. Patient had recent echo 1 month ago at [facility] which showed significantly reduced LV function (EF 25-30%, G2DD, mild MR). Patient denies any fever, chest pain, palpitations, abdominal pain, nausea, vomiting, diarrhea, dysuria. Patient still has good urine output and has left upper extremity fistula not yet used.     In ED vitals: /62, HR 80, RR 20, T 97.7, Spo2 96% on RA.  Labs significant for BNP 23K, Trop 0.05, CXR looks congested - official read pending.  (06 Apr 2022 21:41)    Allergies    sulfa drugs (Anaphylaxis; Swelling)    Intolerances    Patient seen and examined at bedside. No acute events overnight.  Patient without complaints. Pt ambulated without issues/symptoms  Denies CP, SOB, palpitations, or dizziness  Sinus Tachycardia events on telemetry overnight    Vital Signs Last 24 Hrs  T(C): 35.9 (13 Apr 2022 05:00), Max: 36.4 (12 Apr 2022 13:32)  T(F): 96.7 (13 Apr 2022 05:00), Max: 97.6 (12 Apr 2022 13:32)  HR: 72 (13 Apr 2022 08:10) (72 - 82)  BP: 164/80 (13 Apr 2022 08:10) (164/80 - 178/84)  BP(mean): --  RR: 18 (13 Apr 2022 08:10) (18 - 20)  SpO2: --    MEDICATIONS  (STANDING):  aspirin  chewable 81 milliGRAM(s) Oral daily  atorvastatin 80 milliGRAM(s) Oral at bedtime  furosemide   Injectable 80 milliGRAM(s) IV Push two times a day  loratadine 10 milliGRAM(s) Oral daily  metolazone 5 milliGRAM(s) Oral daily  metoprolol tartrate 25 milliGRAM(s) Oral two times a day  multivitamin 1 Tablet(s) Oral daily  NIFEdipine XL 30 milliGRAM(s) Oral every 24 hours  pantoprazole    Tablet 40 milliGRAM(s) Oral before breakfast  ticagrelor 90 milliGRAM(s) Oral every 12 hours    MEDICATIONS  (PRN):  acetaminophen     Tablet .. 650 milliGRAM(s) Oral every 6 hours PRN Temp greater or equal to 38C (100.4F), Mild Pain (1 - 3)  melatonin 3 milliGRAM(s) Oral at bedtime PRN Insomnia  nitroglycerin     SubLingual 0.4 milliGRAM(s) SubLingual every 5 minutes PRN Chest Pain      REVIEW OF SYSTEMS:          All negative except as mentioned in HPI    PHYSICAL EXAM:           CONSTITUTIONAL: Well-developed; well-nourished; in no acute distress  	SKIN: warm, dry  	HEAD: Normocephalic; atraumatic  	EYES: PERRL.  	ENT: No nasal discharge, airway clear, mucous membranes moist  	NECK: Supple; non tender.  	CARD: +S1, +S2, no murmurs, gallops, or rubs. Regular rate and rhythm    	RESP: No wheezes, rales or rhonchi. CTA . Right upper chest [place] cath - used for HD  	ABD: soft ntnd, + BS x 4 quadrants  	EXT: moves all extremities,  no clubbing, cyanosis or edema  	NEURO: Alert and oriented x3, no focal deficits          PSYCH: Cooperative, appropriate          VASCULAR:  + Rad / + PTs / +  DPs          EXTREMITY:             Right Groin: dressing removed, access site soft, no hematoma, no pain, + pulses, no sign of infection, no numbness              ECG:   < from: 12 Lead ECG (04.13.22 @ 07:47) >  Ventricular Rate 76 BPM    Atrial Rate 76 BPM    P-R Interval 178 ms    QRS Duration 138 ms    Q-T Interval 482 ms    QTC Calculation(Bazett) 542 ms    P Axis 43 degrees    R Axis -81 degrees    T Axis 56 degrees    Diagnosis Line Normal sinus rhythm  Left axis deviation  Right bundle branch block  Minimal voltage criteria for LVH, may be normal variant  Abnormal ECG    Confirmed by GIANNA HSU, [facility] ([id number]) on 4/13/2022 8:42:53 AM                                                                                 2D ECHO:  < from: TTE Echo Complete w/o Contrast w/ Doppler (04.08.22 @ 15:54) >  Summary:   1. Left ventricular ejection fraction, by visual estimation, is 45 to   50%.   2. Mildly decreased global left ventricular systolic function.   3. Global cardiomyopathy.   4. Moderate to severely increased left ventricular internal cavity size.   5. Moderately increased LV wall thickness.   6. Mildly enlarged right atrium.   7. Moderately enlarged left atrium.   8. Mild thickening of the anterior and posterior mitral valve leaflets.   9. Moderate mitral valve regurgitation.  10. Mild tricuspid regurgitation.  11. Aortic valve is bicuspid.  12. Sclerotic aortic valve with normal opening.  13. Small patent foramen ovale, with predominantly right to left shunting   across the atrial septum.      LABS:                        11.6   7.87  )-----------( 182      ( 13 Apr 2022 04:30 )             36.2     04-13    139  |  97<L>  |  61<HH>  ----------------------------<  144<H>  4.4   |  28  |  4.5<HH>    Ca    9.4      13 Apr 2022 04:30  Mg     2.0     04-13    TPro  6.1  /  Alb  3.8  /  TBili  0.4  /  DBili  x   /  AST  37  /  ALT  19  /  AlkPhos  49  04-13    Magnesium, Serum: 2.0 mg/dL [1.8 - 2.4] (04-13-22 @ 04:30)  LIVER FUNCTIONS - ( 13 Apr 2022 04:30 )  Alb: 3.8 g/dL / Pro: 6.1 g/dL / ALK PHOS: 49 U/L / ALT: 19 U/L / AST: 37 U/L / GGT: x             A/P:  I discussed the case with Cardiologist Dr. Calvillo and recommend the following:    S/P PCI:   Intervention:   - successful balloon angioplasty, and IVUS guided PCI with KRISHAN x 1 in distal RCA    Implants:   - 4.0 x 16 mm Synergy KRISHAN x 1 in distal RCA     FINDINGS:     Coronary Dominance: right dominant      LM: normal    LAD: mild disease in prox and mid LAD, 40% stenosis of mid LAD  D1: minor luminal irregularities    LCX: mild disease  OM1: minor luminal irregularities    Ramus: minor luminal irregularities    RCA: mild disease of prox and mid RCA, 20% in-stent restenosis of prox RCA, 30% in-stent restenosis of mid RCA. 99% stenosis of distal RCA s/p PCI                          HD today as per Renal                    No ACEi/ARB due to elevated Cr                    Continue DAPT ( Aspirin 81 mg PO Daily and Brilinta 90 mg PO q12 hr  ), CCB, B-Blocker, Statin Therapy, Lasix                   Keep K = 4, Mg = 2                   F/U MUGA scan results                    Ambulate around the unit with assistance, monitor right groin s/p Cardiac Cath site                    Asymmetric edema on LE L>R,  s/p LE Duplex, no DVT                   Patient pharmacy called in for Brilinta prescription and it is 23$ per month, patient is agreeing for it, medication is ready for a  today                    Plavix D/C in patient pharmacy                    Patient agreeing to take DAPT for at least one year or as directed by cardiologist                    Pt given instructions on importance of taking antiplatelet medication or risk acute stent thrombosis/death                   Post cath instructions, access site care and activity restrictions reviewed with patient                     Discussed with patient to return to hospital if experience chest pain, shortness breath, dizziness and site bleeding                   Aggressive risk factor modification, diet counseling, smoking cessation discussed with patient                       Can discharge patient from Interventional Cardiac standpoint after MUGA scan, if patient is ambulating without symptoms and access site wnl, ECG and blood work reviewed                    Benefits of Cardiac Rehab discussed with patient, All documents sent to [facility]hab [facility]. Patient instructed to call and make first                               appointment after first f/u visit with Cardiologist                    Follow up with Cardiology Dr. Calvillo regarding patient disposition. Patient instructed to call and make an appointment in  two weeks.                      Discharge instructions as follows, when ready to d/c:                   - Continue medical regimen as prescribed to prevent chest pain                   - Continue dual anti-platelet therapy, beta blocker, statin, ccb                   - If you are diabetic and taking medication containing Metformin, do not take them for 48 hours after the procedure                   - Instructed to call 911 if chest pain, shortness of breath or bleeding from access site                   - No heavy lifting >10lbs x 1 week                   - No driving x 24 hours                   - No baths, swimming pools x 1 week, may shower                   - Low sodium low fat low cholesterol diet                   - Follow-up with Cardiologist in 1-2 weeks after discharge

## 2022-04-14 ENCOUNTER — TRANSCRIPTION ENCOUNTER (OUTPATIENT)
Age: 72
End: 2022-04-14

## 2022-04-14 VITALS
SYSTOLIC BLOOD PRESSURE: 155 MMHG | HEART RATE: 77 BPM | WEIGHT: 201.94 LBS | TEMPERATURE: 97 F | RESPIRATION RATE: 18 BRPM | DIASTOLIC BLOOD PRESSURE: 70 MMHG

## 2022-04-14 LAB
ALBUMIN SERPL ELPH-MCNC: 3.9 G/DL — SIGNIFICANT CHANGE UP (ref 3.5–5.2)
ALP SERPL-CCNC: 47 U/L — SIGNIFICANT CHANGE UP (ref 30–115)
ALT FLD-CCNC: 19 U/L — SIGNIFICANT CHANGE UP (ref 0–41)
ANION GAP SERPL CALC-SCNC: 14 MMOL/L — SIGNIFICANT CHANGE UP (ref 7–14)
AST SERPL-CCNC: 29 U/L — SIGNIFICANT CHANGE UP (ref 0–41)
BASOPHILS # BLD AUTO: 0.08 K/UL — SIGNIFICANT CHANGE UP (ref 0–0.2)
BASOPHILS NFR BLD AUTO: 0.9 % — SIGNIFICANT CHANGE UP (ref 0–1)
BILIRUB SERPL-MCNC: 0.5 MG/DL — SIGNIFICANT CHANGE UP (ref 0.2–1.2)
BUN SERPL-MCNC: 45 MG/DL — HIGH (ref 10–20)
CALCIUM SERPL-MCNC: 9.2 MG/DL — SIGNIFICANT CHANGE UP (ref 8.5–10.1)
CHLORIDE SERPL-SCNC: 99 MMOL/L — SIGNIFICANT CHANGE UP (ref 98–110)
CO2 SERPL-SCNC: 28 MMOL/L — SIGNIFICANT CHANGE UP (ref 17–32)
CREAT SERPL-MCNC: 4.1 MG/DL — CRITICAL HIGH (ref 0.7–1.5)
EGFR: 15 ML/MIN/1.73M2 — LOW
EOSINOPHIL # BLD AUTO: 0.61 K/UL — SIGNIFICANT CHANGE UP (ref 0–0.7)
EOSINOPHIL NFR BLD AUTO: 6.8 % — SIGNIFICANT CHANGE UP (ref 0–8)
GLUCOSE SERPL-MCNC: 134 MG/DL — HIGH (ref 70–99)
HCT VFR BLD CALC: 34.3 % — LOW (ref 42–52)
HGB BLD-MCNC: 10.9 G/DL — LOW (ref 14–18)
IMM GRANULOCYTES NFR BLD AUTO: 0.3 % — SIGNIFICANT CHANGE UP (ref 0.1–0.3)
LYMPHOCYTES # BLD AUTO: 1.18 K/UL — LOW (ref 1.2–3.4)
LYMPHOCYTES # BLD AUTO: 13.1 % — LOW (ref 20.5–51.1)
MAGNESIUM SERPL-MCNC: 1.9 MG/DL — SIGNIFICANT CHANGE UP (ref 1.8–2.4)
MCHC RBC-ENTMCNC: 26.8 PG — LOW (ref 27–31)
MCHC RBC-ENTMCNC: 31.8 G/DL — LOW (ref 32–37)
MCV RBC AUTO: 84.3 FL — SIGNIFICANT CHANGE UP (ref 80–94)
MONOCYTES # BLD AUTO: 1.15 K/UL — HIGH (ref 0.1–0.6)
MONOCYTES NFR BLD AUTO: 12.7 % — HIGH (ref 1.7–9.3)
NEUTROPHILS # BLD AUTO: 5.97 K/UL — SIGNIFICANT CHANGE UP (ref 1.4–6.5)
NEUTROPHILS NFR BLD AUTO: 66.2 % — SIGNIFICANT CHANGE UP (ref 42.2–75.2)
NRBC # BLD: 0 /100 WBCS — SIGNIFICANT CHANGE UP (ref 0–0)
PLATELET # BLD AUTO: 151 K/UL — SIGNIFICANT CHANGE UP (ref 130–400)
POTASSIUM SERPL-MCNC: 3.6 MMOL/L — SIGNIFICANT CHANGE UP (ref 3.5–5)
POTASSIUM SERPL-SCNC: 3.6 MMOL/L — SIGNIFICANT CHANGE UP (ref 3.5–5)
PROT SERPL-MCNC: 6 G/DL — SIGNIFICANT CHANGE UP (ref 6–8)
RBC # BLD: 4.07 M/UL — LOW (ref 4.7–6.1)
RBC # FLD: 15.6 % — HIGH (ref 11.5–14.5)
SODIUM SERPL-SCNC: 141 MMOL/L — SIGNIFICANT CHANGE UP (ref 135–146)
WBC # BLD: 9.02 K/UL — SIGNIFICANT CHANGE UP (ref 4.8–10.8)
WBC # FLD AUTO: 9.02 K/UL — SIGNIFICANT CHANGE UP (ref 4.8–10.8)

## 2022-04-14 PROCEDURE — 99239 HOSP IP/OBS DSCHRG MGMT >30: CPT

## 2022-04-14 RX ORDER — METOLAZONE 5 MG/1
1 TABLET ORAL
Qty: 30 | Refills: 1
Start: 2022-04-14 | End: 2022-06-12

## 2022-04-14 RX ORDER — LISINOPRIL 2.5 MG/1
5 TABLET ORAL DAILY
Refills: 0 | Status: DISCONTINUED | OUTPATIENT
Start: 2022-04-14 | End: 2022-04-14

## 2022-04-14 RX ORDER — SENNA PLUS 8.6 MG/1
2 TABLET ORAL ONCE
Refills: 0 | Status: DISCONTINUED | OUTPATIENT
Start: 2022-04-14 | End: 2022-04-14

## 2022-04-14 RX ORDER — NIFEDIPINE 30 MG
1 TABLET, EXTENDED RELEASE 24 HR ORAL
Qty: 0 | Refills: 0 | DISCHARGE

## 2022-04-14 RX ORDER — LISINOPRIL 2.5 MG/1
1 TABLET ORAL
Qty: 30 | Refills: 3
Start: 2022-04-14 | End: 2022-08-11

## 2022-04-14 RX ORDER — POLYETHYLENE GLYCOL 3350 17 G/17G
17 POWDER, FOR SOLUTION ORAL ONCE
Refills: 0 | Status: DISCONTINUED | OUTPATIENT
Start: 2022-04-14 | End: 2022-04-14

## 2022-04-14 RX ORDER — FUROSEMIDE 40 MG
80 TABLET ORAL DAILY
Refills: 0 | Status: DISCONTINUED | OUTPATIENT
Start: 2022-04-15 | End: 2022-04-14

## 2022-04-14 RX ORDER — LISINOPRIL 2.5 MG/1
1 TABLET ORAL
Qty: 30 | Refills: 0
Start: 2022-04-14 | End: 2022-05-13

## 2022-04-14 RX ADMIN — Medication 80 MILLIGRAM(S): at 05:29

## 2022-04-14 RX ADMIN — PANTOPRAZOLE SODIUM 40 MILLIGRAM(S): 20 TABLET, DELAYED RELEASE ORAL at 07:45

## 2022-04-14 RX ADMIN — Medication 25 MILLIGRAM(S): at 05:32

## 2022-04-14 RX ADMIN — TICAGRELOR 90 MILLIGRAM(S): 90 TABLET ORAL at 05:32

## 2022-04-14 RX ADMIN — Medication 1 TABLET(S): at 11:12

## 2022-04-14 RX ADMIN — LORATADINE 10 MILLIGRAM(S): 10 TABLET ORAL at 11:12

## 2022-04-14 RX ADMIN — Medication 81 MILLIGRAM(S): at 11:12

## 2022-04-14 NOTE — PROGRESS NOTE ADULT - REASON FOR ADMISSION
SOB

## 2022-04-14 NOTE — DISCHARGE NOTE NURSING/CASE MANAGEMENT/SOCIAL WORK - PATIENT PORTAL LINK FT
You can access the FollowMyHealth Patient Portal offered by [facility] by registering at the following website: [email or web address]. By joining [employer]’s FollowMyHealth portal, you will also be able to view your health information using other applications (apps) compatible with our system.

## 2022-04-14 NOTE — PROGRESS NOTE ADULT - SUBJECTIVE AND OBJECTIVE BOX
Cardiology Follow up    DAWSON FALLON   71y Male  PAST MEDICAL & SURGICAL HISTORY:  HTN (hypertension)    Kidney failure    Pneumonia, pneumococcal    Anemia    DM (diabetes mellitus)    Myocardial infarction    AV fistula    History of right common carotid artery stent placement    H/O colonoscopy  5-10 years ago         HPI:  Pt is a 71 male with PMH HTN, HLD, MI, anemia , CKD5 presents to ED complaining of shortness of breath which started 3 weeks ago and has been getting progressively worse. Patient states that little activity gets him sob. He also noted having trouble lying down flat and has had increased LE swelling. Patient had recent echo 1 month ago at [facility] which showed significantly reduced LV function (EF 25-30%, G2DD, mild MR). Patient denies any fever, chest pain, palpitations, abdominal pain, nausea, vomiting, diarrhea, dysuria. Patient still has good urine output and has left upper extremity fistula not yet used.     In ED vitals: /62, HR 80, RR 20, T 97.7, Spo2 96% on RA.  Labs significant for BNP 23K, Trop 0.05, CXR looks congested - official read pending.  (06 Apr 2022 21:41)    Allergies    sulfa drugs (Anaphylaxis; Swelling)    Intolerances    Patient seen and examined at bedside. No acute events overnight.  Patient without complaints. Pt ambulated without issues/symptoms  Denies CP, SOB, palpitations, or dizziness  No events on telemetry overnight    Vital Signs Last 24 Hrs  T(C): 36.2 (14 Apr 2022 05:00), Max: 36.2 (14 Apr 2022 05:00)  T(F): 97.2 (14 Apr 2022 05:00), Max: 97.2 (14 Apr 2022 05:00)  HR: 77 (14 Apr 2022 05:00) (77 - 88)  BP: 155/70 (14 Apr 2022 05:00) (118/56 - 155/70)  BP(mean): --  RR: 18 (14 Apr 2022 05:00) (18 - 18)  SpO2: --    MEDICATIONS  (STANDING):  aspirin  chewable 81 milliGRAM(s) Oral daily  atorvastatin 80 milliGRAM(s) Oral at bedtime  furosemide    Tablet 80 milliGRAM(s) Oral daily  lisinopril 5 milliGRAM(s) Oral daily  loratadine 10 milliGRAM(s) Oral daily  metolazone 5 milliGRAM(s) Oral daily  metoprolol tartrate 25 milliGRAM(s) Oral two times a day  multivitamin 1 Tablet(s) Oral daily  NIFEdipine XL 30 milliGRAM(s) Oral every 24 hours  pantoprazole    Tablet 40 milliGRAM(s) Oral before breakfast  polyethylene glycol 3350 17 Gram(s) Oral once  senna 2 Tablet(s) Oral once  ticagrelor 90 milliGRAM(s) Oral every 12 hours    MEDICATIONS  (PRN):  acetaminophen     Tablet .. 650 milliGRAM(s) Oral every 6 hours PRN Temp greater or equal to 38C (100.4F), Mild Pain (1 - 3)  melatonin 3 milliGRAM(s) Oral at bedtime PRN Insomnia  nitroglycerin     SubLingual 0.4 milliGRAM(s) SubLingual every 5 minutes PRN Chest Pain      REVIEW OF SYSTEMS:          All negative except as mentioned in HPI    PHYSICAL EXAM:           CONSTITUTIONAL: Well-developed; well-nourished; in no acute distress  	SKIN: warm, dry  	HEAD: Normocephalic; atraumatic  	EYES: PERRL.  	ENT: No nasal discharge, airway clear, mucous membranes moist  	NECK: Supple; non tender.   	CARD: +S1, +S2, no murmurs, gallops, or rubs. Regular rate and rhythm    	RESP: No wheezes, rales or rhonchi. CTA B/L; s/p TDC, right side D/C/I  	ABD: soft ntnd, + BS x 4 quadrants  	EXT: moves all extremities,  no clubbing, cyanosis or edema  	NEURO: Alert and oriented x3, no focal deficits          PSYCH: Cooperative, appropriate          VASCULAR:  + Rad / + PTs / +  DPs          EXTREMITY:             Right Groin:  Dressing D/C/I, pressure dressing removed, access site soft, no hematoma, no pain, + pulses, no sign of infection, no numbness  	   Right Radial: Dressing D/C/I, pressure dressing removed, access site soft, no hematoma, no pain, + pulses, no sign of infection, no numbness            ECG:   < from: 12 Lead ECG (04.13.22 @ 07:47) >  Ventricular Rate 76 BPM    Atrial Rate 76 BPM    P-R Interval 178 ms    QRS Duration 138 ms    Q-T Interval 482 ms    QTC Calculation(Bazett) 542 ms    P Axis 43 degrees    R Axis -81 degrees    T Axis 56 degrees    Diagnosis Line Normal sinus rhythm  Left axis deviation  Right bundle branch block  Minimal voltage criteria for LVH, may be normal variant  Abnormal ECG    Confirmed by GIANNA HSU, [facility] ([id number]) on 4/13/2022 8:42:53 AM                                                                                             2D ECHO:  < from: TTE Echo Complete w/o Contrast w/ Doppler (04.08.22 @ 15:54) >  Summary:   1. Left ventricular ejection fraction, by visual estimation, is 45 to   50%.   2. Mildly decreased global left ventricular systolic function.   3. Global cardiomyopathy.   4. Moderate to severely increased left ventricular internal cavity size.   5. Moderately increased LV wall thickness.   6. Mildly enlarged right atrium.   7. Moderately enlarged left atrium.   8. Mild thickening of the anterior and posterior mitral valve leaflets.   9. Moderate mitral valve regurgitation.  10. Mild tricuspid regurgitation.  11. Aortic valve is bicuspid.  12. Sclerotic aortic valve with normal opening.  13. Small patent foramen ovale, with predominantly right to left shunting   across the atrial septum.    LABS:                        10.9   9.02  )-----------( 151      ( 14 Apr 2022 05:38 )             34.3     04-14    141  |  99  |  45<H>  ----------------------------<  134<H>  3.6   |  28  |  4.1<HH>    Ca    9.2      14 Apr 2022 05:38  Mg     1.9     04-14    TPro  6.0  /  Alb  3.9  /  TBili  0.5  /  DBili  x   /  AST  29  /  ALT  19  /  AlkPhos  47  04-14    Magnesium, Serum: 1.9 mg/dL [1.8 - 2.4] (04-14-22 @ 05:38)  LIVER FUNCTIONS - ( 14 Apr 2022 05:38 )  Alb: 3.9 g/dL / Pro: 6.0 g/dL / ALK PHOS: 47 U/L / ALT: 19 U/L / AST: 29 U/L / GGT: x           A/P:  I discussed the case with Cardiologist Dr. Calvillo and recommend the following:    S/P PCI:   Intervention:   - successful balloon angioplasty, and IVUS guided PCI with KRISHAN x 1 in distal RCA    Implants:   - 4.0 x 16 mm Synergy KRISHAN x 1 in distal RCA     FINDINGS:     Coronary Dominance: right dominant      LM: normal    LAD: mild disease in prox and mid LAD, 40% stenosis of mid LAD  D1: minor luminal irregularities    LCX: mild disease  OM1: minor luminal irregularities    Ramus: minor luminal irregularities    RCA: mild disease of prox and mid RCA, 20% in-stent restenosis of prox RCA, 30% in-stent restenosis of mid RCA. 99% stenosis of distal RCA s/p PCI                          F/U MUGA scan results                    No ACEi/ARB due to elevated Cr                    Continue DAPT ( Aspirin 81 mg PO Daily and Brilinta 90 mg PO q12 hr  ), CCB, B-Blocker, Statin Therapy, Lasix                   Keep K = 4, Mg = 2                   Ambulate around the unit with assistance, monitor right groin s/p Cardiac Cath site                    Asymmetric edema on LE L>R,  s/p LE Duplex, no DVT                   Patient pharmacy called in for Brilinta prescription and it is 23$ per month, patient is agreeing for it, medication is ready for a  today                    Plavix D/C in patient pharmacy                    Patient agreeing to take DAPT for at least one year or as directed by cardiologist                    Pt given instructions on importance of taking antiplatelet medication or risk acute stent thrombosis/death                   Post cath instructions, access site care and activity restrictions reviewed with patient                     Discussed with patient to return to hospital if experience chest pain, shortness breath, dizziness and site bleeding                   Aggressive risk factor modification, diet counseling, smoking cessation discussed with patient                       Can discharge patient from Interventional Cardiac standpoint after MUGA scan, if patient is ambulating without symptoms and access site wnl, ECG and blood work reviewed                    Benefits of Cardiac Rehab discussed with patient, All documents sent to [facility]. Patient instructed to call and make first                               appointment after first f/u visit with Cardiologist                    Follow up with Cardiology Dr. Calvillo in 2 weeks.  Patient instructed to call and make an appointment.                     Discharge instructions as follows, when ready to d/c:                   - Continue medical regimen as prescribed to prevent chest pain                   - Continue dual anti-platelet therapy, beta blocker, statin, ccb, lasix                   - If you are diabetic and taking medication containing Metformin, do not take them for 48 hours after the procedure                   - Instructed to call 911 if chest pain, shortness of breath or bleeding from access site                   - No heavy lifting >10lbs x 1 week                   - No driving x 24 hours                   - No baths, swimming pools x 1 week, may shower                   - Low sodium low fat low cholesterol, renal diet                   - Follow-up with Cardiologist in 2 weeks after discharge

## 2022-04-14 NOTE — PROGRESS NOTE ADULT - PROVIDER SPECIALTY LIST ADULT
Hospitalist
Internal Medicine
Intervent Cardiology
Intervent Radiology
Nephrology
Cardiology
Hospitalist
Hospitalist
Internal Medicine
Internal Medicine
Intervent Cardiology
Nephrology
Cardiology
Hospitalist
Internal Medicine
Intervent Radiology
Hospitalist
Internal Medicine
Internal Medicine
Cardiology

## 2022-04-14 NOTE — PROGRESS NOTE ADULT - ASSESSMENT
A 71 years old male presented to the ED with progressively worsening sob for the last one week. Also c/o LE swelling and orthopnea.     Hb: 10.4   BUN/Cr: 82/5.2  Pro BNP: 09177  CXR: B/L pleural effusion. Increase intersitial markings. (Check official report)  Trop: 0.05-->0.04      1. Acute HFmrEF  2. DM-2 / HTN / DL  3. CKD-5. On HD now  4. Anemia of chronic disease  5. CAD S/P PCI             PLAN:    ·	S/P MUGA scan. Check the result before discharge  ·	Care d/w the nephrology. Cleared the pt to d/c home today  ·	S/P cath and KRISHAN of distal LAD on 4/12  ·	Cont ASA, Ticagrelor, Metoprolol and Lipitor  ·	Add Lisinopril 5 mg po daily  ·	S/P HD yesterday. Euvolemic.   ·	Switch him to Lasix 80 mg po q 12h and Metolazone 5 mg po daily.   ·	Check i's and o's and daily wt.   ·	Low salt diet and water restriction to 1.5 L/D  ·	ECHO showed EF is 45-50%. Moderate MR  ·	Monitor FS. On no meds. Sugars are stable  ·	Venous doppler of the LE is negative for DVT  ·	 for d/c planning        * Med rec reviewed. Plan of care d/w the pt. Time spent 38 minutes.

## 2022-04-14 NOTE — PROGRESS NOTE ADULT - SUBJECTIVE AND OBJECTIVE BOX
DAWSON FALLON  71y Male    CHIEF COMPLAINT:    Patient is a 71y old  Male who presents with a chief complaint of SOB (2022 09:01)      INTERVAL HPI/OVERNIGHT EVENTS:    Patient seen and examined. S/P HD yesterday. No more sob. Leg swelling has gone down.     ROS: All other systems are negative.    Vital Signs:    T(F): 97.2 (22 @ 05:00), Max: 97.2 (22 @ 05:00)  HR: 77 (22 @ 05:00) (77 - 88)  BP: 155/70 (22 @ 05:00) (111/62 - 155/70)  RR: 18 (22 @ 05:00) (18 - 20)  SpO2: --  I&O's Summary    2022 07:01  -  2022 07:00  --------------------------------------------------------  IN: 880 mL / OUT: 3400 mL / NET: -2520 mL      Daily     Daily Weight in k.6 (2022 05:00)  CAPILLARY BLOOD GLUCOSE      POCT Blood Glucose.: 160 mg/dL (2022 21:49)  POCT Blood Glucose.: 159 mg/dL (2022 16:27)      PHYSICAL EXAM:    GENERAL:  NAD  SKIN: No rashes or lesions  HENT: Atraumatic. Normocephalic. PERRL. Moist membranes.  NECK: Supple, No JVD. No lymphadenopathy.  PULMONARY: CTA B/L. No wheezing. No rales  CVS: Normal S1, S2. Rate and Rhythm are regular. No murmurs.  ABDOMEN/GI: Soft, Nontender, Nondistended; BS present  EXTREMITIES: Peripheral pulses intact. Trace edema B/L LE.  NEUROLOGIC:  No motor or sensory deficit.  PSYCH: Alert & oriented x 3    Consultant(s) Notes Reviewed:  [x ] YES  [ ] NO  Care Discussed with Consultants/Other Providers [ x] YES  [ ] NO    EKG reviewed  Telemetry reviewed    LABS:                        10.9   9.02  )-----------( 151      ( 2022 05:38 )             34.3         141  |  99  |  45<H>  ----------------------------<  134<H>  3.6   |  28  |  4.1<HH>    Ca    9.2      2022 05:38  Mg     1.9         TPro  6.0  /  Alb  3.9  /  TBili  0.5  /  DBili  x   /  AST  29  /  ALT  19  /  AlkPhos  47                RADIOLOGY & ADDITIONAL TESTS:      Imaging or report Personally Reviewed:  [ ] YES  [ ] NO    Medications:  Standing  aspirin  chewable 81 milliGRAM(s) Oral daily  atorvastatin 80 milliGRAM(s) Oral at bedtime  furosemide   Injectable 80 milliGRAM(s) IV Push two times a day  loratadine 10 milliGRAM(s) Oral daily  metolazone 5 milliGRAM(s) Oral daily  metoprolol tartrate 25 milliGRAM(s) Oral two times a day  multivitamin 1 Tablet(s) Oral daily  NIFEdipine XL 30 milliGRAM(s) Oral every 24 hours  pantoprazole    Tablet 40 milliGRAM(s) Oral before breakfast  polyethylene glycol 3350 17 Gram(s) Oral once  senna 2 Tablet(s) Oral once  ticagrelor 90 milliGRAM(s) Oral every 12 hours    PRN Meds  acetaminophen     Tablet .. 650 milliGRAM(s) Oral every 6 hours PRN  melatonin 3 milliGRAM(s) Oral at bedtime PRN  nitroglycerin     SubLingual 0.4 milliGRAM(s) SubLingual every 5 minutes PRN      Case discussed with resident    Care discussed with pt/family

## 2022-04-14 NOTE — PROGRESS NOTE ADULT - CARDIOVASCULAR
Regular rate & rhythm, normal S1, S2; no murmurs, gallops or rubs; no S3, S4
Regular rate & rhythm, normal S1, S2; no murmurs, gallops or rubs; no S3, S4
detailed exam
Regular rate & rhythm, normal S1, S2; no murmurs, gallops or rubs; no S3, S4
Regular rate & rhythm, normal S1, S2; no murmurs, gallops or rubs; no S3, S4
detailed exam
detailed exam

## 2022-04-14 NOTE — PROGRESS NOTE ADULT - RESPIRATORY
Breath Sounds equal & clear to percussion & auscultation, no accessory muscle use
detailed exam
Breath Sounds equal & clear to percussion & auscultation, no accessory muscle use
detailed exam
Breath Sounds equal & clear to percussion & auscultation, no accessory muscle use
Breath Sounds equal & clear to percussion & auscultation, no accessory muscle use
detailed exam

## 2022-04-14 NOTE — PROGRESS NOTE ADULT - SUBJECTIVE AND OBJECTIVE BOX
Nephrology progress note    Patient is seen and examined, events over the last 24 h noted .  s/p HD yesterday  No SOB    Allergies:  sulfa drugs (Anaphylaxis; Swelling)    Hospital Medications:   MEDICATIONS  (STANDING):  aspirin  chewable 81 milliGRAM(s) Oral daily  atorvastatin 80 milliGRAM(s) Oral at bedtime  furosemide   Injectable 80 milliGRAM(s) IV Push two times a day  loratadine 10 milliGRAM(s) Oral daily  metolazone 5 milliGRAM(s) Oral daily  metoprolol tartrate 25 milliGRAM(s) Oral two times a day  multivitamin 1 Tablet(s) Oral daily  NIFEdipine XL 30 milliGRAM(s) Oral every 24 hours  pantoprazole    Tablet 40 milliGRAM(s) Oral before breakfast  polyethylene glycol 3350 17 Gram(s) Oral once  senna 2 Tablet(s) Oral once  ticagrelor 90 milliGRAM(s) Oral every 12 hours        VITALS:  T(F): 97.2 (04-14-22 @ 05:00), Max: 97.2 (04-14-22 @ 05:00)  HR: 77 (04-14-22 @ 05:00)  BP: 155/70 (04-14-22 @ 05:00)  RR: 18 (04-14-22 @ 05:00)  SpO2: --  Wt(kg): --    04-12 @ 07:01  -  04-13 @ 07:00  --------------------------------------------------------  IN: 720 mL / OUT: 1200 mL / NET: -480 mL    04-13 @ 07:01  -  04-14 @ 07:00  --------------------------------------------------------  IN: 880 mL / OUT: 3400 mL / NET: -2520 mL          PHYSICAL EXAM:  Constitutional: NAD  HEENT: anicteric sclera, oropharynx clear, MMM  Neck: No JVD  Respiratory: CTA  Cardiovascular: S1, S2, RRR  Gastrointestinal: BS+, soft, NT/ND  Extremities: No peripheral edema  Neurological: A/O x 3, no focal deficits  : No CVA tenderness. No albarado.   Skin: No rashes  Vascular Access: TDC, Lt AVF    LABS:  04-14    141  |  99  |  45<H>  ----------------------------<  134<H>  3.6   |  28  |  4.1<HH>    Ca    9.2      14 Apr 2022 05:38  Mg     1.9     04-14    TPro  6.0  /  Alb  3.9  /  TBili  0.5  /  DBili      /  AST  29  /  ALT  19  /  AlkPhos  47  04-14                          10.9   9.02  )-----------( 151      ( 14 Apr 2022 05:38 )             34.3       Urine Studies:      RADIOLOGY & ADDITIONAL STUDIES:  < from: Xray Chest 2 Views PA/Lat (04.08.22 @ 17:44) >    Increased left basilar opacity/effusion.    Stable right basilar opacity/effusion.    < end of copied text >

## 2022-04-14 NOTE — PROGRESS NOTE ADULT - ASSESSMENT
Pt with CKD stage 5, DM for 15 years (was on Ozempic), HTN, CAD, HFrEF, admitted with fluid overload.  # CKD 5 -  started on  HD for fluid overload, advanced CKD  # Etiology of CKD  - likely diabetic nephropathy  # ADHF / HFrEF    - s/p yesterday 1.5 L UF  - cont po Lasix 80 q12- metolazone / non oliguric   - cardiology notes appreciated / sp cath and PCI RCA   - pt had a nephrotic range proteinuria 5 g/g with negative w/u for myeloma, vasculitis APL2R Ab  - ph at [facility]   - had a Kidney Tx eval at [facility]  - asymmetric edema on LE L>R,  s/p LE Duplex, no DVT  - [facility]  May d/c pt to home from renal standpoint  - will follow as OP

## 2022-04-14 NOTE — PROGRESS NOTE ADULT - SUBJECTIVE AND OBJECTIVE BOX
DAWSON FALLON 71y Male  MRN#: [id number]   Hospital Day: 8d    SUBJECTIVE  Patient is a 71y old Male with PMH HTN, HLD, MI, anemia , CKD5 presents to ED complaining of shortness of breath which started 3 weeks ago and has been getting progressively worse.    Currently admitted to medicine with the primary diagnosis of CHF exacerbation    INTERVAL HPI AND OVERNIGHT EVENTS:  Patient was examined and seen at bedside. This morning he is resting comfortably in bed and reports no issues or overnight events.    REVIEW OF SYMPTOMS:  Denies any HA, CP, SOB, abd pain. No fever, sweats or chills.    OBJECTIVE  PAST MEDICAL & SURGICAL HISTORY  HTN (hypertension)    Kidney failure    Pneumonia, pneumococcal    Anemia    DM (diabetes mellitus)    Myocardial infarction    AV fistula    History of right common carotid artery stent placement    H/O colonoscopy  5-10 years ago      ALLERGIES:  sulfa drugs (Anaphylaxis; Swelling)    MEDICATIONS:  STANDING MEDICATIONS  aspirin  chewable 81 milliGRAM(s) Oral daily  atorvastatin 80 milliGRAM(s) Oral at bedtime  furosemide   Injectable 80 milliGRAM(s) IV Push two times a day  loratadine 10 milliGRAM(s) Oral daily  metolazone 5 milliGRAM(s) Oral daily  metoprolol tartrate 25 milliGRAM(s) Oral two times a day  multivitamin 1 Tablet(s) Oral daily  NIFEdipine XL 30 milliGRAM(s) Oral every 24 hours  pantoprazole    Tablet 40 milliGRAM(s) Oral before breakfast  polyethylene glycol 3350 17 Gram(s) Oral once  senna 2 Tablet(s) Oral once  ticagrelor 90 milliGRAM(s) Oral every 12 hours    PRN MEDICATIONS  acetaminophen     Tablet .. 650 milliGRAM(s) Oral every 6 hours PRN  melatonin 3 milliGRAM(s) Oral at bedtime PRN  nitroglycerin     SubLingual 0.4 milliGRAM(s) SubLingual every 5 minutes PRN      VITAL SIGNS: Last 24 Hours  T(C): 36.2 (14 Apr 2022 05:00), Max: 36.2 (14 Apr 2022 05:00)  T(F): 97.2 (14 Apr 2022 05:00), Max: 97.2 (14 Apr 2022 05:00)  HR: 77 (14 Apr 2022 05:00) (77 - 88)  BP: 155/70 (14 Apr 2022 05:00) (111/62 - 155/70)  BP(mean): --  RR: 18 (14 Apr 2022 05:00) (18 - 20)  SpO2: --    LABS:                        10.9   9.02  )-----------( 151      ( 14 Apr 2022 05:38 )             34.3     04-13    139  |  97<L>  |  61<HH>  ----------------------------<  144<H>  4.4   |  28  |  4.5<HH>    Ca    9.4      13 Apr 2022 04:30  Mg     2.0     04-13    TPro  6.1  /  Alb  3.8  /  TBili  0.4  /  DBili  x   /  AST  37  /  ALT  19  /  AlkPhos  49  04-13    RADIOLOGY:      PHYSICAL EXAM:  CONSTITUTIONAL: No acute distress, AAOx3  HEAD: Atraumatic, normocephalic  EYES: conjunctiva and sclera clear  ENT: No JVD  PULMONARY: no wheezes or crackles  CARDIOVASCULAR: Regular rate and rhythm; no murmurs  GASTROINTESTINAL: Soft, non-tender, non-distended; bowel sounds present  MUSCULOSKELETAL: no edema, right sided tunneled cath, no erythema/swelling at site  NEUROLOGY: non-focal  SKIN: No rashes or lesions; warm and dry

## 2022-04-14 NOTE — PROGRESS NOTE ADULT - ASSESSMENT
Patient is a 71y old Male with PMH HTN, HLD, MI, anemia , CKD5 presents to ED complaining of shortness of breath which started 3 weeks ago and has been getting progressively worse.    Currently admitted to medicine with the primary diagnosis of CHF exacerbation    #SOB most likely secondary to Acute HFrEF   - On admision BNP 23K  - echo 3/29/22 at [place] showed EF 25-30%, G2DD, mild MR   - repeat echo: EF 45-50%, Bicuspid aortic valve, small patent foramen ovale, Moderate MR  - CXR: Increased right basilar opacity/effusion, Stable left perihilar opacity. No pneumothorax  - c/w lasix 80mg IVP BID  - c/w metolazone 5mg daily  - cardio consulted - Dr. Valdez  - s/p [facility] with PCI with KRISHAN in the distal RCA 4/12  - cont metoprolol tartrate 25 bid, not on acei/arb likely due to worsening renal function   - monitor Is and Os, daily weights   - Va duplex: neg for DVT    #CKD5 s/p 1st session of HD (4/7)  #Elevated troponin likely 2/2 ckd   - unknown Cr, GFR baseline  - left arm precautions for AVF   - trop 0.05 -> 0.4  - avoid nephrotoxic agents   - nepro consulted - s/p first HD (4/7)  - AVF infiltrated 4/8  - s/p  TDC placement, right side, 15.5F 19cm tip to cuff by IR 4/11    #HTN - controlled  - on amlodipine at home  - monitor off BP meds for now    #HLD  - Held fenofibrate due to CKD  - c/w lipitor 80mg daiy    #CAD  - c/w lipitor 80mg daiy  - c/w ASA 81mg daily  - c/w plavix 75mg daily  - c/w metoprolol tart 25mg BID    #CORAZON   - cont multivitamin     #Misc   Diet: DASH/CC   Activity: IAT   GI ppx: PPI   DVT: ppx heparin   Code status: Full code    Dispo: pending HD set up

## 2022-04-20 DIAGNOSIS — I50.21 ACUTE SYSTOLIC (CONGESTIVE) HEART FAILURE: ICD-10-CM

## 2022-04-20 DIAGNOSIS — I25.2 OLD MYOCARDIAL INFARCTION: ICD-10-CM
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
## 2022-04-20 DIAGNOSIS — I45.10 UNSPECIFIED RIGHT BUNDLE-BRANCH BLOCK: ICD-10-CM

## 2022-04-20 DIAGNOSIS — E11.22 TYPE 2 DIABETES MELLITUS WITH DIABETIC CHRONIC KIDNEY DISEASE: ICD-10-CM
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
## 2022-04-20 DIAGNOSIS — E78.5 HYPERLIPIDEMIA, UNSPECIFIED: ICD-10-CM

## 2022-04-20 DIAGNOSIS — I42.9 CARDIOMYOPATHY, UNSPECIFIED: ICD-10-CM

## 2022-04-20 DIAGNOSIS — T82.41XA BREAKDOWN (MECHANICAL) OF VASCULAR DIALYSIS CATHETER, INITIAL ENCOUNTER: ICD-10-CM

## 2022-04-20 DIAGNOSIS — I13.2 HYPERTENSIVE HEART AND CHRONIC KIDNEY DISEASE WITH HEART FAILURE AND WITH STAGE 5 CHRONIC KIDNEY DISEASE, OR END STAGE RENAL DISEASE: ICD-10-CM

## 2022-04-20 DIAGNOSIS — Z79.82 LONG TERM (CURRENT) USE OF ASPIRIN: ICD-10-CM

## 2022-04-20 DIAGNOSIS — Z88.2 ALLERGY STATUS TO SULFONAMIDES: ICD-10-CM

## 2022-04-20 DIAGNOSIS — I25.110 ATHEROSCLEROTIC HEART DISEASE OF NATIVE CORONARY ARTERY WITH UNSTABLE ANGINA PECTORIS: ICD-10-CM

## 2022-04-20 DIAGNOSIS — N18.6 END STAGE RENAL DISEASE: ICD-10-CM

## 2022-04-20 DIAGNOSIS — Z99.2 DEPENDENCE ON RENAL DIALYSIS: ICD-10-CM

## 2022-04-28 ENCOUNTER — APPOINTMENT (OUTPATIENT)
Dept: GASTROENTEROLOGY | Facility: CLINIC | Age: 72
End: 2022-04-28

## 2022-05-02 RX ORDER — FUROSEMIDE 40 MG
3 TABLET ORAL
Qty: 154 | Refills: 3
Start: 2022-05-02 | End: 2023-04-26

## 2022-06-14 NOTE — PATIENT PROFILE ADULT - PATIENT'S GENDER IDENTITY
CHW reached out to pt to remind her of office appointment with Patricia Contreras LCSW, on tomorrow. No answer, left VM of the appointment.    
Male

## 2023-02-16 ENCOUNTER — APPOINTMENT (OUTPATIENT)
Dept: UROLOGY | Facility: CLINIC | Age: 73
End: 2023-02-16

## 2023-03-20 ENCOUNTER — APPOINTMENT (OUTPATIENT)
Dept: UROLOGY | Facility: CLINIC | Age: 73
End: 2023-03-20
Payer: MEDICARE

## 2023-03-20 VITALS
TEMPERATURE: 98.6 F | WEIGHT: 211 LBS | DIASTOLIC BLOOD PRESSURE: 59 MMHG | HEART RATE: 92 BPM | OXYGEN SATURATION: 98 % | SYSTOLIC BLOOD PRESSURE: 120 MMHG | RESPIRATION RATE: 18 BRPM | BODY MASS INDEX: 28.58 KG/M2 | HEIGHT: 72 IN

## 2023-03-20 PROCEDURE — 99214 OFFICE O/P EST MOD 30 MIN: CPT

## 2023-03-20 NOTE — ASSESSMENT
[FreeTextEntry1] : 72 year old urinary symptoms and bladder emptying improved on Alfuzosin. \par \par PSA obtained today\par PVR reviewed\par \par Plan\par -Follow up 12 months \par -continue Follow up with Nephrology \par -Continue Alfuzosin\par - PSA today will call to review\par

## 2023-03-20 NOTE — HISTORY OF PRESENT ILLNESS
[FreeTextEntry1] : 72 year old male with BPH on Alfuzosin. \par \par no new urologic complaints\par no recent PSA on record\par \par \par Patient got a repeat bladder US which reveals PVR of 52 mL. \par \par PSA done 07/27/2021 is 0.3 ng/mL. \par \par followed by Dr. Betancourt for renal insufficiency [None] : no symptoms

## 2023-03-23 LAB
PSA FREE FLD-MCNC: 23 %
PSA FREE SERPL-MCNC: 0.13 NG/ML
PSA SERPL-MCNC: 0.59 NG/ML

## 2023-04-20 ENCOUNTER — OUTPATIENT (OUTPATIENT)
Dept: OUTPATIENT SERVICES | Facility: HOSPITAL | Age: 73
LOS: 1 days | End: 2023-04-20
Payer: MEDICARE

## 2023-04-20 DIAGNOSIS — Z98.890 OTHER SPECIFIED POSTPROCEDURAL STATES: Chronic | ICD-10-CM

## 2023-04-20 DIAGNOSIS — I25.10 ATHEROSCLEROTIC HEART DISEASE OF NATIVE CORONARY ARTERY WITHOUT ANGINA PECTORIS: ICD-10-CM

## 2023-04-20 DIAGNOSIS — I77.0 ARTERIOVENOUS FISTULA, ACQUIRED: Chronic | ICD-10-CM

## 2023-04-20 DIAGNOSIS — Z00.8 ENCOUNTER FOR OTHER GENERAL EXAMINATION: ICD-10-CM

## 2023-04-20 PROCEDURE — 78452 HT MUSCLE IMAGE SPECT MULT: CPT | Mod: 26,MH

## 2023-04-20 PROCEDURE — A9500: CPT

## 2023-04-20 PROCEDURE — 78452 HT MUSCLE IMAGE SPECT MULT: CPT

## 2023-04-21 DIAGNOSIS — I25.10 ATHEROSCLEROTIC HEART DISEASE OF NATIVE CORONARY ARTERY WITHOUT ANGINA PECTORIS: ICD-10-CM

## 2023-06-26 ENCOUNTER — OUTPATIENT (OUTPATIENT)
Dept: OUTPATIENT SERVICES | Facility: HOSPITAL | Age: 73
LOS: 1 days | End: 2023-06-26

## 2023-06-26 ENCOUNTER — APPOINTMENT (OUTPATIENT)
Dept: MRI IMAGING | Facility: CLINIC | Age: 73
End: 2023-06-26
Payer: MEDICARE

## 2023-06-26 DIAGNOSIS — Z98.890 OTHER SPECIFIED POSTPROCEDURAL STATES: Chronic | ICD-10-CM

## 2023-06-26 DIAGNOSIS — I77.0 ARTERIOVENOUS FISTULA, ACQUIRED: Chronic | ICD-10-CM

## 2023-06-26 PROCEDURE — 75557 CARDIAC MRI FOR MORPH: CPT | Mod: 26,MH

## 2023-09-11 ENCOUNTER — APPOINTMENT (OUTPATIENT)
Dept: ELECTROPHYSIOLOGY | Facility: CLINIC | Age: 73
End: 2023-09-11
Payer: MEDICARE

## 2023-09-11 VITALS
SYSTOLIC BLOOD PRESSURE: 119 MMHG | TEMPERATURE: 98 F | HEIGHT: 71 IN | WEIGHT: 198.13 LBS | BODY MASS INDEX: 27.74 KG/M2 | HEART RATE: 73 BPM | DIASTOLIC BLOOD PRESSURE: 73 MMHG

## 2023-09-11 DIAGNOSIS — E78.00 PURE HYPERCHOLESTEROLEMIA, UNSPECIFIED: ICD-10-CM

## 2023-09-11 DIAGNOSIS — I45.4 NONSPECIFIC INTRAVENTRICULAR BLOCK: ICD-10-CM

## 2023-09-11 DIAGNOSIS — Z82.49 FAMILY HISTORY OF ISCHEMIC HEART DISEASE AND OTHER DISEASES OF THE CIRCULATORY SYSTEM: ICD-10-CM

## 2023-09-11 DIAGNOSIS — I87.1 COMPRESSION OF VEIN: ICD-10-CM

## 2023-09-11 DIAGNOSIS — Z78.9 OTHER SPECIFIED HEALTH STATUS: ICD-10-CM

## 2023-09-11 DIAGNOSIS — Z80.9 FAMILY HISTORY OF MALIGNANT NEOPLASM, UNSPECIFIED: ICD-10-CM

## 2023-09-11 DIAGNOSIS — Z80.0 FAMILY HISTORY OF MALIGNANT NEOPLASM OF DIGESTIVE ORGANS: ICD-10-CM

## 2023-09-11 PROCEDURE — 99205 OFFICE O/P NEW HI 60 MIN: CPT

## 2023-09-11 PROCEDURE — 93000 ELECTROCARDIOGRAM COMPLETE: CPT

## 2023-09-11 RX ORDER — DOXAZOSIN 8 MG/1
TABLET ORAL
Refills: 0 | Status: COMPLETED | COMMUNITY
End: 2023-09-11

## 2023-09-11 RX ORDER — FENOFIBRATE 150 MG/1
CAPSULE ORAL
Refills: 0 | Status: COMPLETED | COMMUNITY
End: 2023-09-11

## 2023-09-11 RX ORDER — AMLODIPINE BESYLATE 5 MG/1
5 TABLET ORAL
Refills: 0 | Status: COMPLETED | COMMUNITY
End: 2023-09-11

## 2023-10-09 ENCOUNTER — OUTPATIENT (OUTPATIENT)
Dept: OUTPATIENT SERVICES | Facility: HOSPITAL | Age: 73
LOS: 1 days | End: 2023-10-09
Payer: MEDICARE

## 2023-10-09 ENCOUNTER — RESULT REVIEW (OUTPATIENT)
Age: 73
End: 2023-10-09

## 2023-10-09 DIAGNOSIS — Z00.8 ENCOUNTER FOR OTHER GENERAL EXAMINATION: ICD-10-CM

## 2023-10-09 DIAGNOSIS — Z98.890 OTHER SPECIFIED POSTPROCEDURAL STATES: Chronic | ICD-10-CM

## 2023-10-09 DIAGNOSIS — I77.0 ARTERIOVENOUS FISTULA, ACQUIRED: Chronic | ICD-10-CM

## 2023-10-09 DIAGNOSIS — I87.1 COMPRESSION OF VEIN: ICD-10-CM

## 2023-10-09 PROCEDURE — 71260 CT THORAX DX C+: CPT

## 2023-10-09 PROCEDURE — 71260 CT THORAX DX C+: CPT | Mod: 26,MH

## 2023-10-10 DIAGNOSIS — I87.1 COMPRESSION OF VEIN: ICD-10-CM

## 2023-10-11 ENCOUNTER — OUTPATIENT (OUTPATIENT)
Dept: OUTPATIENT SERVICES | Facility: HOSPITAL | Age: 73
LOS: 1 days | End: 2023-10-11
Payer: MEDICARE

## 2023-10-11 VITALS
DIASTOLIC BLOOD PRESSURE: 72 MMHG | WEIGHT: 197.09 LBS | RESPIRATION RATE: 18 BRPM | SYSTOLIC BLOOD PRESSURE: 158 MMHG | HEART RATE: 75 BPM | OXYGEN SATURATION: 100 % | TEMPERATURE: 98 F | HEIGHT: 71 IN

## 2023-10-11 DIAGNOSIS — Z98.49 CATARACT EXTRACTION STATUS, UNSPECIFIED EYE: Chronic | ICD-10-CM

## 2023-10-11 DIAGNOSIS — Z01.818 ENCOUNTER FOR OTHER PREPROCEDURAL EXAMINATION: ICD-10-CM

## 2023-10-11 DIAGNOSIS — I50.22 CHRONIC SYSTOLIC (CONGESTIVE) HEART FAILURE: ICD-10-CM

## 2023-10-11 DIAGNOSIS — Z98.890 OTHER SPECIFIED POSTPROCEDURAL STATES: Chronic | ICD-10-CM

## 2023-10-11 DIAGNOSIS — I77.0 ARTERIOVENOUS FISTULA, ACQUIRED: Chronic | ICD-10-CM

## 2023-10-11 LAB
ALBUMIN SERPL ELPH-MCNC: 4.6 G/DL — SIGNIFICANT CHANGE UP (ref 3.5–5.2)
ALP SERPL-CCNC: 101 U/L — SIGNIFICANT CHANGE UP (ref 30–115)
ALT FLD-CCNC: 18 U/L — SIGNIFICANT CHANGE UP (ref 0–41)
ANION GAP SERPL CALC-SCNC: 14 MMOL/L — SIGNIFICANT CHANGE UP (ref 7–14)
APTT BLD: 44.6 SEC — HIGH (ref 27–39.2)
AST SERPL-CCNC: 25 U/L — SIGNIFICANT CHANGE UP (ref 0–41)
BASOPHILS # BLD AUTO: 0.05 K/UL — SIGNIFICANT CHANGE UP (ref 0–0.2)
BASOPHILS NFR BLD AUTO: 0.9 % — SIGNIFICANT CHANGE UP (ref 0–1)
BILIRUB SERPL-MCNC: 0.3 MG/DL — SIGNIFICANT CHANGE UP (ref 0.2–1.2)
BLD GP AB SCN SERPL QL: SIGNIFICANT CHANGE UP
BUN SERPL-MCNC: 58 MG/DL — HIGH (ref 10–20)
CALCIUM SERPL-MCNC: 9.7 MG/DL — SIGNIFICANT CHANGE UP (ref 8.4–10.5)
CHLORIDE SERPL-SCNC: 94 MMOL/L — LOW (ref 98–110)
CO2 SERPL-SCNC: 28 MMOL/L — SIGNIFICANT CHANGE UP (ref 17–32)
CREAT SERPL-MCNC: 4.2 MG/DL — CRITICAL HIGH (ref 0.7–1.5)
EGFR: 14 ML/MIN/1.73M2 — LOW
EOSINOPHIL # BLD AUTO: 0.24 K/UL — SIGNIFICANT CHANGE UP (ref 0–0.7)
EOSINOPHIL NFR BLD AUTO: 4.2 % — SIGNIFICANT CHANGE UP (ref 0–8)
GLUCOSE SERPL-MCNC: 101 MG/DL — HIGH (ref 70–99)
HCT VFR BLD CALC: 35.1 % — LOW (ref 42–52)
HGB BLD-MCNC: 11.2 G/DL — LOW (ref 14–18)
IMM GRANULOCYTES NFR BLD AUTO: 0.4 % — HIGH (ref 0.1–0.3)
INR BLD: 0.92 RATIO — SIGNIFICANT CHANGE UP (ref 0.65–1.3)
LYMPHOCYTES # BLD AUTO: 0.61 K/UL — LOW (ref 1.2–3.4)
LYMPHOCYTES # BLD AUTO: 10.8 % — LOW (ref 20.5–51.1)
MCHC RBC-ENTMCNC: 28.4 PG — SIGNIFICANT CHANGE UP (ref 27–31)
MCHC RBC-ENTMCNC: 31.9 G/DL — LOW (ref 32–37)
MCV RBC AUTO: 89.1 FL — SIGNIFICANT CHANGE UP (ref 80–94)
MONOCYTES # BLD AUTO: 0.65 K/UL — HIGH (ref 0.1–0.6)
MONOCYTES NFR BLD AUTO: 11.5 % — HIGH (ref 1.7–9.3)
MRSA PCR RESULT.: NEGATIVE — SIGNIFICANT CHANGE UP
NEUTROPHILS # BLD AUTO: 4.1 K/UL — SIGNIFICANT CHANGE UP (ref 1.4–6.5)
NEUTROPHILS NFR BLD AUTO: 72.2 % — SIGNIFICANT CHANGE UP (ref 42.2–75.2)
NRBC # BLD: 0 /100 WBCS — SIGNIFICANT CHANGE UP (ref 0–0)
PLATELET # BLD AUTO: 94 K/UL — LOW (ref 130–400)
PMV BLD: 14.2 FL — HIGH (ref 7.4–10.4)
POTASSIUM SERPL-MCNC: 5.1 MMOL/L — HIGH (ref 3.5–5)
POTASSIUM SERPL-SCNC: 5.1 MMOL/L — HIGH (ref 3.5–5)
PROT SERPL-MCNC: 6.8 G/DL — SIGNIFICANT CHANGE UP (ref 6–8)
PROTHROM AB SERPL-ACNC: 10.5 SEC — SIGNIFICANT CHANGE UP (ref 9.95–12.87)
RBC # BLD: 3.94 M/UL — LOW (ref 4.7–6.1)
RBC # FLD: 15.7 % — HIGH (ref 11.5–14.5)
SODIUM SERPL-SCNC: 136 MMOL/L — SIGNIFICANT CHANGE UP (ref 135–146)
WBC # BLD: 5.67 K/UL — SIGNIFICANT CHANGE UP (ref 4.8–10.8)
WBC # FLD AUTO: 5.67 K/UL — SIGNIFICANT CHANGE UP (ref 4.8–10.8)

## 2023-10-11 PROCEDURE — 87640 STAPH A DNA AMP PROBE: CPT

## 2023-10-11 PROCEDURE — 85610 PROTHROMBIN TIME: CPT

## 2023-10-11 PROCEDURE — 86900 BLOOD TYPING SEROLOGIC ABO: CPT

## 2023-10-11 PROCEDURE — 80053 COMPREHEN METABOLIC PANEL: CPT

## 2023-10-11 PROCEDURE — 99214 OFFICE O/P EST MOD 30 MIN: CPT | Mod: 25

## 2023-10-11 PROCEDURE — 86901 BLOOD TYPING SEROLOGIC RH(D): CPT

## 2023-10-11 PROCEDURE — 93005 ELECTROCARDIOGRAM TRACING: CPT

## 2023-10-11 PROCEDURE — 85025 COMPLETE CBC W/AUTO DIFF WBC: CPT

## 2023-10-11 PROCEDURE — 85730 THROMBOPLASTIN TIME PARTIAL: CPT

## 2023-10-11 PROCEDURE — 36415 COLL VENOUS BLD VENIPUNCTURE: CPT

## 2023-10-11 PROCEDURE — 86850 RBC ANTIBODY SCREEN: CPT

## 2023-10-11 PROCEDURE — 93010 ELECTROCARDIOGRAM REPORT: CPT

## 2023-10-11 PROCEDURE — 87641 MR-STAPH DNA AMP PROBE: CPT

## 2023-10-11 RX ORDER — FENOFIBRATE,MICRONIZED 130 MG
1 CAPSULE ORAL
Qty: 0 | Refills: 0 | DISCHARGE

## 2023-10-11 NOTE — H&P PST ADULT - REASON FOR ADMISSION
Case Type: OP Block TimeSuite: EP LabProceduralist: Sang Gordon  Confirmed Surgery DateTime: 10-   Procedure: ICD BIV  ERP?: UnavailableLaterality: N/ALength of Procedure: 180 Minutes  Anesthesia Type: Local Standby

## 2023-10-11 NOTE — H&P PST ADULT - NSANTHOSAYNRD_GEN_A_CORE
No. DWAINE screening performed.  STOP BANG Legend: 0-2 = LOW Risk; 3-4 = INTERMEDIATE Risk; 5-8 = HIGH Risk

## 2023-10-11 NOTE — H&P PST ADULT - NSICDXPASTSURGICALHX_GEN_ALL_CORE_FT
PAST SURGICAL HISTORY:  AV fistula     H/O colonoscopy 5-10 years ago    History of right common carotid artery stent placement     S/P cataract surgery

## 2023-10-11 NOTE — H&P PST ADULT - LAST CARDIAC ANGIOGRAM/IMAGING
Quality 110: Preventive Care And Screening: Influenza Immunization: Influenza Immunization Administered during Influenza season
Detail Level: Detailed
4/2022

## 2023-10-11 NOTE — H&P PST ADULT - HISTORY OF PRESENT ILLNESS
74 yo male presents for PAST in preparation for ICD Bi- Ventricular. pt states  that he has CHF which makes him to walk less than 0.5 mile a day, also reports SOB /ESCAMILLA with ambulation and  can climb up only one flight of stairs, uses cane for assistance, had cardiac stent placed in April of this year ( total of three). Pt is on dialysis since April 2022, AV shunt to left arm , dialyzed on Tue/Thur/Sat at [facility] on [facility]. Pt will have dialysis the day prior surgery and will need the day after ( message sent thru teams to EP team)     PATIENT CURRENTLY DENIES CHEST PAIN    PALPITATIONS,  DYSURIA, OR URI WITHIN THE IN PAST 2 WEEKS    -Denies travel outside the [place] in the past 30 days  -Patient denies any signs or symptoms of COVID 19 and denies contact with known positive individuals.    -Patient was instructed to quarantine pre-procedure, practice exposure control measures, continue to self-monitor and report any concerns to their proceduralist.  -Pt advised self quarantine till day of procedure    ANESTHESIA ALERT  NO--Difficult Airway  NO--History of neck surgery or radiation  NO--Limited ROM of neck  NO--History of Malignant hyperthermia  NO--No personal or family history of Pseudocholinesterase deficiency.  NO--Prior Anesthesia Complication  NO--Latex Allergy  NO--Loose teeth  NO--History of Rheumatoid Arthritis  NO--Bleeding risk  NO--DWAINE  YES--Implants- AV SHUNT-left arm precautions   NO--Other_____  Duke Activity Status Index (DASI) from [employer]  on 10/11/2023  ** All calculations should be rechecked by clinician prior to use **    RESULT SUMMARY:  24.95 points  The higher the score (maximum 58.2), the higher the functional status.    5.81 METs        INPUTS:  Take care of self —> 2.75 = Yes  Walk indoors —> 1.75 = Yes  Walk 1&ndash;2 blocks on level ground —> 2.75 = Yes  Climb a flight of stairs or walk up a hill —> 5.5 = Yes  Run a short distance —> 0 = No  Do light work around the house —> 2.7 = Yes  Do moderate work around the house —> 3.5 = Yes  Do heavy work around the house —> 0 = No  Do yardwork —> 0 = No  Have sexual relations —> 0 = No  Participate in moderate recreational activities —> 6 = Yes  Participate in strenuous sports —> 0 = No  Revised Cardiac Risk Index for Pre-Operative Risk from [email or web address]  on 10/11/2023  ** All calculations should be rechecked by clinician prior to use **    RESULT SUMMARY:  4 points  Class IV Risk    15.0 %  30-day risk of death, MI, or cardiac arrest    From Duceppe 2017, based on pooled data from 5 high quality external validations (4 prospective). These numbers are higher than those often quoted from the now-outdated original study (Nigel 1999). See Evidence for details.      INPUTS:  Elevated-risk surgery —> 1 = Yes  History of ischemic heart disease —> 1 = Yes  History of congestive heart failure —> 1 = Yes  History of cerebrovascular disease —> 0 = No  Pre-operative treatment with insulin —> 0 = No  Pre-operative creatinine >2 mg/dL / 176.8 µmol/L —> 1 = Yes    -PT DENIES ANY RASHES, ABRASION, OR OPEN WOUNDS   -Pt denies any suicidal ideation or thoughts.  Pt states not a threat to self or others.  AS PER THE PT, THIS IS HIS/HER COMPLETE MEDICAL AND SURGICAL HX, INCLUDING MEDICATIONS PRESCRIBED AND OVER THE COUNTER  Patient verbalized understanding of instructions and was given the opportunity to ask questions and have them answered.

## 2023-10-12 ENCOUNTER — OUTPATIENT (OUTPATIENT)
Dept: OUTPATIENT SERVICES | Facility: HOSPITAL | Age: 73
LOS: 1 days | End: 2023-10-12
Payer: MEDICARE

## 2023-10-12 DIAGNOSIS — Z98.890 OTHER SPECIFIED POSTPROCEDURAL STATES: Chronic | ICD-10-CM

## 2023-10-12 DIAGNOSIS — I50.22 CHRONIC SYSTOLIC (CONGESTIVE) HEART FAILURE: ICD-10-CM

## 2023-10-12 DIAGNOSIS — Z02.9 ENCOUNTER FOR ADMINISTRATIVE EXAMINATIONS, UNSPECIFIED: ICD-10-CM

## 2023-10-12 DIAGNOSIS — Z98.49 CATARACT EXTRACTION STATUS, UNSPECIFIED EYE: Chronic | ICD-10-CM

## 2023-10-12 DIAGNOSIS — I77.0 ARTERIOVENOUS FISTULA, ACQUIRED: Chronic | ICD-10-CM

## 2023-10-12 DIAGNOSIS — Z01.818 ENCOUNTER FOR OTHER PREPROCEDURAL EXAMINATION: ICD-10-CM

## 2023-10-12 LAB
APPEARANCE UR: CLEAR — SIGNIFICANT CHANGE UP
APPEARANCE UR: CLEAR — SIGNIFICANT CHANGE UP
BACTERIA # UR AUTO: NEGATIVE /HPF — SIGNIFICANT CHANGE UP
BILIRUB UR-MCNC: NEGATIVE — SIGNIFICANT CHANGE UP
BILIRUB UR-MCNC: NEGATIVE — SIGNIFICANT CHANGE UP
CAST: 20 /LPF — HIGH (ref 0–4)
COLOR SPEC: YELLOW — SIGNIFICANT CHANGE UP
COLOR SPEC: YELLOW — SIGNIFICANT CHANGE UP
DIFF PNL FLD: NEGATIVE — SIGNIFICANT CHANGE UP
DIFF PNL FLD: NEGATIVE — SIGNIFICANT CHANGE UP
FINE GRAN CASTS #/AREA URNS AUTO: PRESENT
GLUCOSE UR QL: NEGATIVE MG/DL — SIGNIFICANT CHANGE UP
GLUCOSE UR QL: NEGATIVE MG/DL — SIGNIFICANT CHANGE UP
HYALINE CASTS # UR AUTO: 10 /LPF — HIGH (ref 0–4)
KETONES UR-MCNC: ABNORMAL MG/DL
KETONES UR-MCNC: ABNORMAL MG/DL
LEUKOCYTE ESTERASE UR-ACNC: NEGATIVE — SIGNIFICANT CHANGE UP
LEUKOCYTE ESTERASE UR-ACNC: NEGATIVE — SIGNIFICANT CHANGE UP
NITRITE UR-MCNC: NEGATIVE — SIGNIFICANT CHANGE UP
NITRITE UR-MCNC: NEGATIVE — SIGNIFICANT CHANGE UP
PH UR: 6 — SIGNIFICANT CHANGE UP (ref 5–8)
PH UR: 6 — SIGNIFICANT CHANGE UP (ref 5–8)
PROT UR-MCNC: 100 MG/DL
PROT UR-MCNC: 100 MG/DL
RBC CASTS # UR COMP ASSIST: 3 /HPF — SIGNIFICANT CHANGE UP (ref 0–4)
SP GR SPEC: 1.02 — SIGNIFICANT CHANGE UP (ref 1–1.03)
SP GR SPEC: 1.02 — SIGNIFICANT CHANGE UP (ref 1–1.03)
SPERM AB SPEC-ACNC: PRESENT
SQUAMOUS # UR AUTO: 2 /HPF — SIGNIFICANT CHANGE UP (ref 0–5)
UROBILINOGEN FLD QL: 1 MG/DL — SIGNIFICANT CHANGE UP (ref 0.2–1)
UROBILINOGEN FLD QL: 1 MG/DL — SIGNIFICANT CHANGE UP (ref 0.2–1)
WBC UR QL: 2 /HPF — SIGNIFICANT CHANGE UP (ref 0–5)

## 2023-10-12 PROCEDURE — 81001 URINALYSIS AUTO W/SCOPE: CPT

## 2023-10-12 PROCEDURE — 87077 CULTURE AEROBIC IDENTIFY: CPT

## 2023-10-12 PROCEDURE — 87186 SC STD MICRODIL/AGAR DIL: CPT

## 2023-10-12 PROCEDURE — 87086 URINE CULTURE/COLONY COUNT: CPT

## 2023-10-13 ENCOUNTER — EMERGENCY (EMERGENCY)
Facility: HOSPITAL | Age: 73
LOS: 0 days | Discharge: HOME | End: 2023-10-13
Attending: STUDENT IN AN ORGANIZED HEALTH CARE EDUCATION/TRAINING PROGRAM
Payer: MEDICARE

## 2023-10-13 VITALS
WEIGHT: 198.2 LBS | HEIGHT: 71 IN | DIASTOLIC BLOOD PRESSURE: 67 MMHG | HEART RATE: 97 BPM | SYSTOLIC BLOOD PRESSURE: 144 MMHG | OXYGEN SATURATION: 98 % | TEMPERATURE: 98 F | RESPIRATION RATE: 17 BRPM
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
## 2023-10-13 DIAGNOSIS — Z98.890 OTHER SPECIFIED POSTPROCEDURAL STATES: Chronic | ICD-10-CM

## 2023-10-13 DIAGNOSIS — E78.5 HYPERLIPIDEMIA, UNSPECIFIED: ICD-10-CM
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
## 2023-10-13 DIAGNOSIS — Z87.891 PERSONAL HISTORY OF NICOTINE DEPENDENCE: ICD-10-CM

## 2023-10-13 DIAGNOSIS — Z99.2 DEPENDENCE ON RENAL DIALYSIS: ICD-10-CM

## 2023-10-13 DIAGNOSIS — Z88.2 ALLERGY STATUS TO SULFONAMIDES: ICD-10-CM

## 2023-10-13 DIAGNOSIS — I12.0 HYPERTENSIVE CHRONIC KIDNEY DISEASE WITH STAGE 5 CHRONIC KIDNEY DISEASE OR END STAGE RENAL DISEASE: ICD-10-CM

## 2023-10-13 DIAGNOSIS — Z98.49 CATARACT EXTRACTION STATUS, UNSPECIFIED EYE: Chronic | ICD-10-CM

## 2023-10-13 DIAGNOSIS — M79.10 MYALGIA, UNSPECIFIED SITE: ICD-10-CM

## 2023-10-13 DIAGNOSIS — M54.50 LOW BACK PAIN, UNSPECIFIED: ICD-10-CM

## 2023-10-13 DIAGNOSIS — Z86.2 PERSONAL HISTORY OF DISEASES OF THE BLOOD AND BLOOD-FORMING ORGANS AND CERTAIN DISORDERS INVOLVING THE IMMUNE MECHANISM: ICD-10-CM

## 2023-10-13 DIAGNOSIS — I77.0 ARTERIOVENOUS FISTULA, ACQUIRED: Chronic | ICD-10-CM

## 2023-10-13 DIAGNOSIS — Z02.9 ENCOUNTER FOR ADMINISTRATIVE EXAMINATIONS, UNSPECIFIED: ICD-10-CM

## 2023-10-13 DIAGNOSIS — Z79.85 LONG-TERM (CURRENT) USE OF INJECTABLE NON-INSULIN ANTIDIABETIC DRUGS: ICD-10-CM

## 2023-10-13 DIAGNOSIS — I25.2 OLD MYOCARDIAL INFARCTION: ICD-10-CM

## 2023-10-13 DIAGNOSIS — N18.5 CHRONIC KIDNEY DISEASE, STAGE 5: ICD-10-CM
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
## 2023-10-13 DIAGNOSIS — Z79.82 LONG TERM (CURRENT) USE OF ASPIRIN: ICD-10-CM

## 2023-10-13 PROCEDURE — 72170 X-RAY EXAM OF PELVIS: CPT

## 2023-10-13 PROCEDURE — 72170 X-RAY EXAM OF PELVIS: CPT | Mod: 26

## 2023-10-13 PROCEDURE — 99283 EMERGENCY DEPT VISIT LOW MDM: CPT | Mod: 25

## 2023-10-13 PROCEDURE — 99284 EMERGENCY DEPT VISIT MOD MDM: CPT | Mod: FS

## 2023-10-13 RX ORDER — METHOCARBAMOL 500 MG/1
750 TABLET, FILM COATED ORAL ONCE
Refills: 0 | Status: COMPLETED | OUTPATIENT
Start: 2023-10-13 | End: 2023-10-13

## 2023-10-13 RX ORDER — ACETAMINOPHEN 500 MG
975 TABLET ORAL ONCE
Refills: 0 | Status: COMPLETED | OUTPATIENT
Start: 2023-10-13 | End: 2023-10-13

## 2023-10-13 RX ORDER — TIZANIDINE 4 MG/1
2 TABLET ORAL
Qty: 42 | Refills: 0
Start: 2023-10-13 | End: 2023-10-19

## 2023-10-13 RX ADMIN — Medication 975 MILLIGRAM(S): at 16:16

## 2023-10-13 RX ADMIN — METHOCARBAMOL 750 MILLIGRAM(S): 500 TABLET, FILM COATED ORAL at 16:16

## 2023-10-13 NOTE — ED PROVIDER NOTE - OBJECTIVE STATEMENT
73-year-old male past medical history HTN, HLD, MI, anemia, CKD 5 (dialysis MWF) presents with complaint of left low back pain.  Patient reports earlier today he was doing yard work at home and stood up quickly.  Reports pain to left gluteal area with flexion at the left hip. Denies radiation of pain. Denies other injuries, head trauma. Denies f/c, chest pain, shortness of breath, abdominal pain, n/v/d, change in bowel/bladder habits, dysuria/frequency/urgency/hematuria, numbness/tingling.

## 2023-10-13 NOTE — ED PROVIDER NOTE - NSFOLLOWUPINSTRUCTIONS_ED_ALL_ED_FT
Follow-up with your PCP in 1-3 days.  medications were sent to your pharmacy. Take as prescribed.      Gluteal Strain  Back view of the body's muscles showing the gluteal muscle.  A gluteal strain happens when the muscles in the buttocks (gluteal muscles) are overstretched or torn. A tear can be partial or complete. A gluteal strain can cause pain and stiffness in your buttocks, legs, and lower back. A strain might also be called "pulling a muscle."    The severity of a gluteal strain is rated as Grade 1, 2, or 3. A Grade 3 strain has the most tearing and pain.    What are the causes?  This condition may be caused by:  Stretching the muscles too far.  Putting too much stress on the muscles before they are warmed up.  Overusing the muscles.  Repetitive muscle movements over long periods of time.  Injury.  What increases the risk?  The following factors may make you more likely to develop this condition:  Being out in cold weather.  Being physically tired.  Having poor strength and flexibility.  Not warming up properly before physical activity.  Exercising or playing sports with sudden bursts of activity.  Having poor exercise techniques.  What are the signs or symptoms?  Symptoms of this condition include:  Pain in the buttocks, especially when moving the legs. Pain may spread to the lower back or to the legs.  Bruising and swelling of the buttocks.  Tenderness, weakness, or stiffness in the buttocks.  Muscle spasms.  How is this diagnosed?  This condition is diagnosed based on:  A physical exam.  Your medical history.  How well you can do certain range of motion exercises.  Imaging tests, such as MRI, ultrasound, or X-rays.  Your strain may be rated based on how severe it is. The ratings are:  Grade 1 strain (mild). Muscles are overstretched. You might have very small muscle tears. This type of strain generally heals in about one week.  Grade 2 strain (moderate). Muscles are partially torn. This may take one to two months to heal.  Grade 3 strain (severe). Muscles are completely torn. A severe strain can take more than three months to heal. Grade 3 gluteal strains are rare.  How is this treated?  This condition may be treated with:  Resting, icing, and raising (elevating) the injured area as much as possible.  Over-the-counter pain medicines.  If your gluteal strain is severe or very painful, your health care provider may prescribe pain medicines or physical therapy. Surgery for severe strains is rare.    Follow these instructions at home:  Managing pain, stiffness, and swelling    A bag of ice on a towel on the skin.  If directed, put ice on the injured area:  Put ice in a plastic bag.  Place a towel between your skin and the bag.  Leave the ice on for 20 minutes, 2–3 times per day.  Activity    Do not drive or use heavy machinery while taking prescription pain medicine.  Return to your normal activities as told by your health care provider. Ask your health care provider what activities are safe for you.  Rest your gluteal muscles as much as possible, especially for the first 2–3 days.  Begin exercising or stretching as told by your health care provider.  General instructions    Take over-the-counter and prescription medicines only as told by your health care provider.  Follow your treatment plan as told by your health care provider. This may include:  Physical therapy.  Massage.  Local electrical stimulation (transcutaneous electrical nerve stimulation, TENS).  Keep all follow-up visits. This is important.  How is this prevented?  Warm up and stretch before physical activity.  Stretch after physical activity.  Learn and use correct techniques for exercising and playing sports.  Avoid difficult physical activities if your muscles are tired or sore.  Strengthen your gluteal muscles and the surrounding muscles.  Develop your flexibility by stretching at least once a day.  Contact a health care provider if:  You have pain or swelling that gets worse or does not get better with medicine.  Summary  A gluteal strain happens when the muscles in the buttocks (gluteal muscles) are overstretched or torn.  A gluteal strain can cause pain and stiffness in your buttocks, legs, and lower back.  If your gluteal strain is severe or very painful, your health care provider may prescribe pain medicines or physical therapy.

## 2023-10-13 NOTE — ED PROVIDER NOTE - CLINICAL SUMMARY MEDICAL DECISION MAKING FREE TEXT BOX
73-year-old male presenting with sudden onset left upper gluteal pain.  Imaging ordered and reviewed by me.  Appropriate medications.  Effects reassessed.  Given strict return precautions and follow-up outpatient.  Patient comfortable with plan.  Stable for discharge.

## 2023-10-13 NOTE — ED PROVIDER NOTE - PHYSICAL EXAMINATION
Physical Exam    Vital Signs: I have reviewed the initial vital signs.  Constitutional: appears stated age, no acute distress  Eyes: Sclera clear, EOMI.  Cardiovascular: S1 and S2, regular rate, regular rhythm, well-perfused extremities, radial pulses equal and 2+, pedal pulses 2+ and equal  Respiratory: unlabored respiratory effort, clear to auscultation bilaterally no wheezing, rales, or rhonchi  Gastrointestinal:  abdomen soft, non-tender  Musculoskeletal: supple neck, no lower extremity edema, no midline tenderness, no bony tenderness, tenderness to palpation at left gluteus at superior lateral aspect, left gluteal pain with left hip flexion  Integumentary: warm, dry, no rash  Neurologic: awake, alert, oriented x3, extremities’ motor and sensory functions grossly intact

## 2023-10-13 NOTE — ED ADULT TRIAGE NOTE - CHIEF COMPLAINT QUOTE
BIBA from home, was doing yard work when stood up quickly & had sudden pain in left hip with difficulty ambulating

## 2023-10-13 NOTE — ED PROVIDER NOTE - ATTENDING APP SHARED VISIT CONTRIBUTION OF CARE
73-year-old male, past medical history of hypertension, hyperlipidemia, MI, CKD 5 on HD Monday/Wednesday/Friday, presenting with left upper gluteal pain after he was picking up something off the ground about 4 hours prior to arrival, worse with movement, mildly alleviated with Salonpas, no associated symptoms.    + Tenderness to palpation of the left upper gluteus region, pain elicited with movement of the left lower extremity  Able to ambulate independently without assistance  No other external signs of trauma.

## 2023-10-13 NOTE — ED PROVIDER NOTE - PATIENT PORTAL LINK FT
You can access the FollowMyHealth Patient Portal offered by [facility] by registering at the following website: [email or web address]. By joining [employer]’s FollowMyHealth portal, you will also be able to view your health information using other applications (apps) compatible with our system.

## 2023-10-16 ENCOUNTER — NON-APPOINTMENT (OUTPATIENT)
Age: 73
End: 2023-10-16

## 2023-10-19 ENCOUNTER — INPATIENT (INPATIENT)
Facility: HOSPITAL | Age: 73
LOS: 0 days | Discharge: HOME | DRG: 276 | End: 2023-10-20
Attending: INTERNAL MEDICINE | Admitting: INTERNAL MEDICINE
Payer: MEDICARE

## 2023-10-19 ENCOUNTER — APPOINTMENT (OUTPATIENT)
Dept: ELECTROPHYSIOLOGY | Facility: HOSPITAL | Age: 73
End: 2023-10-19

## 2023-10-19 ENCOUNTER — TRANSCRIPTION ENCOUNTER (OUTPATIENT)
Age: 73
End: 2023-10-19

## 2023-10-19 VITALS
WEIGHT: 194.67 LBS | HEART RATE: 70 BPM | DIASTOLIC BLOOD PRESSURE: 67 MMHG | OXYGEN SATURATION: 97 % | RESPIRATION RATE: 18 BRPM | TEMPERATURE: 97 F | HEIGHT: 71 IN | SYSTOLIC BLOOD PRESSURE: 150 MMHG

## 2023-10-19 DIAGNOSIS — Z98.890 OTHER SPECIFIED POSTPROCEDURAL STATES: Chronic | ICD-10-CM

## 2023-10-19 DIAGNOSIS — Z98.49 CATARACT EXTRACTION STATUS, UNSPECIFIED EYE: Chronic | ICD-10-CM

## 2023-10-19 DIAGNOSIS — I48.91 UNSPECIFIED ATRIAL FIBRILLATION: ICD-10-CM

## 2023-10-19 DIAGNOSIS — I77.0 ARTERIOVENOUS FISTULA, ACQUIRED: Chronic | ICD-10-CM

## 2023-10-19 DIAGNOSIS — I50.22 CHRONIC SYSTOLIC (CONGESTIVE) HEART FAILURE: ICD-10-CM

## 2023-10-19 LAB
BLD GP AB SCN SERPL QL: SIGNIFICANT CHANGE UP
BLD GP AB SCN SERPL QL: SIGNIFICANT CHANGE UP
GLUCOSE BLDC GLUCOMTR-MCNC: 107 MG/DL — HIGH (ref 70–99)
GLUCOSE BLDC GLUCOMTR-MCNC: 107 MG/DL — HIGH (ref 70–99)
GLUCOSE BLDC GLUCOMTR-MCNC: 155 MG/DL — HIGH (ref 70–99)
GLUCOSE BLDC GLUCOMTR-MCNC: 155 MG/DL — HIGH (ref 70–99)
GLUCOSE BLDC GLUCOMTR-MCNC: 90 MG/DL — SIGNIFICANT CHANGE UP (ref 70–99)
GLUCOSE BLDC GLUCOMTR-MCNC: 90 MG/DL — SIGNIFICANT CHANGE UP (ref 70–99)

## 2023-10-19 PROCEDURE — C1900: CPT

## 2023-10-19 PROCEDURE — 71045 X-RAY EXAM CHEST 1 VIEW: CPT

## 2023-10-19 PROCEDURE — C1777: CPT

## 2023-10-19 PROCEDURE — C1892: CPT

## 2023-10-19 PROCEDURE — 71045 X-RAY EXAM CHEST 1 VIEW: CPT | Mod: 26

## 2023-10-19 PROCEDURE — 90935 HEMODIALYSIS ONE EVALUATION: CPT

## 2023-10-19 PROCEDURE — C1889: CPT

## 2023-10-19 PROCEDURE — 93005 ELECTROCARDIOGRAM TRACING: CPT | Mod: XU

## 2023-10-19 PROCEDURE — 33225 L VENTRIC PACING LEAD ADD-ON: CPT

## 2023-10-19 PROCEDURE — 80202 ASSAY OF VANCOMYCIN: CPT

## 2023-10-19 PROCEDURE — C1882: CPT

## 2023-10-19 PROCEDURE — 84100 ASSAY OF PHOSPHORUS: CPT

## 2023-10-19 PROCEDURE — 86901 BLOOD TYPING SEROLOGIC RH(D): CPT

## 2023-10-19 PROCEDURE — C1898: CPT

## 2023-10-19 PROCEDURE — C1730: CPT

## 2023-10-19 PROCEDURE — 33249 INSJ/RPLCMT DEFIB W/LEAD(S): CPT

## 2023-10-19 PROCEDURE — 86850 RBC ANTIBODY SCREEN: CPT

## 2023-10-19 PROCEDURE — C1887: CPT

## 2023-10-19 PROCEDURE — 36415 COLL VENOUS BLD VENIPUNCTURE: CPT

## 2023-10-19 PROCEDURE — 93284 PRGRMG EVAL IMPLANTABLE DFB: CPT

## 2023-10-19 PROCEDURE — 82962 GLUCOSE BLOOD TEST: CPT

## 2023-10-19 PROCEDURE — 86900 BLOOD TYPING SEROLOGIC ABO: CPT

## 2023-10-19 PROCEDURE — C1769: CPT

## 2023-10-19 PROCEDURE — 71046 X-RAY EXAM CHEST 2 VIEWS: CPT

## 2023-10-19 RX ORDER — VANCOMYCIN HCL 1 G
1000 VIAL (EA) INTRAVENOUS ONCE
Refills: 0 | Status: COMPLETED | OUTPATIENT
Start: 2023-10-19 | End: 2023-10-19

## 2023-10-19 RX ORDER — ISOSORBIDE MONONITRATE 60 MG/1
30 TABLET, EXTENDED RELEASE ORAL DAILY
Refills: 0 | Status: DISCONTINUED | OUTPATIENT
Start: 2023-10-19 | End: 2023-10-20

## 2023-10-19 RX ORDER — SACUBITRIL AND VALSARTAN 24; 26 MG/1; MG/1
1 TABLET, FILM COATED ORAL
Refills: 0 | Status: DISCONTINUED | OUTPATIENT
Start: 2023-10-19 | End: 2023-10-20

## 2023-10-19 RX ORDER — FAMOTIDINE 10 MG/ML
1 INJECTION INTRAVENOUS
Qty: 0 | Refills: 0 | DISCHARGE

## 2023-10-19 RX ORDER — LORATADINE 10 MG/1
1 TABLET ORAL
Qty: 0 | Refills: 0 | DISCHARGE

## 2023-10-19 RX ORDER — ROSUVASTATIN CALCIUM 5 MG/1
1 TABLET ORAL
Qty: 0 | Refills: 0 | DISCHARGE

## 2023-10-19 RX ORDER — FUROSEMIDE 40 MG
20 TABLET ORAL DAILY
Refills: 0 | Status: DISCONTINUED | OUTPATIENT
Start: 2023-10-19 | End: 2023-10-20

## 2023-10-19 RX ORDER — ATORVASTATIN CALCIUM 80 MG/1
80 TABLET, FILM COATED ORAL AT BEDTIME
Refills: 0 | Status: DISCONTINUED | OUTPATIENT
Start: 2023-10-19 | End: 2023-10-20

## 2023-10-19 RX ORDER — TAMSULOSIN HYDROCHLORIDE 0.4 MG/1
0.8 CAPSULE ORAL AT BEDTIME
Refills: 0 | Status: DISCONTINUED | OUTPATIENT
Start: 2023-10-19 | End: 2023-10-20

## 2023-10-19 RX ORDER — CLOPIDOGREL BISULFATE 75 MG/1
75 TABLET, FILM COATED ORAL DAILY
Refills: 0 | Status: DISCONTINUED | OUTPATIENT
Start: 2023-10-20 | End: 2023-10-20

## 2023-10-19 RX ORDER — ASPIRIN/CALCIUM CARB/MAGNESIUM 324 MG
81 TABLET ORAL DAILY
Refills: 0 | Status: DISCONTINUED | OUTPATIENT
Start: 2023-10-19 | End: 2023-10-20

## 2023-10-19 RX ORDER — METOPROLOL TARTRATE 50 MG
25 TABLET ORAL
Refills: 0 | Status: DISCONTINUED | OUTPATIENT
Start: 2023-10-19 | End: 2023-10-20

## 2023-10-19 RX ORDER — DARBEPOETIN ALFA IN POLYSORBAT 200MCG/0.4
0 PEN INJECTOR (ML) SUBCUTANEOUS
Qty: 0 | Refills: 0 | DISCHARGE

## 2023-10-19 RX ORDER — ACETAMINOPHEN 500 MG
650 TABLET ORAL EVERY 6 HOURS
Refills: 0 | Status: DISCONTINUED | OUTPATIENT
Start: 2023-10-19 | End: 2023-10-20

## 2023-10-19 RX ADMIN — Medication 25 MILLIGRAM(S): at 17:31

## 2023-10-19 RX ADMIN — SACUBITRIL AND VALSARTAN 1 TABLET(S): 24; 26 TABLET, FILM COATED ORAL at 17:31

## 2023-10-19 RX ADMIN — ATORVASTATIN CALCIUM 80 MILLIGRAM(S): 80 TABLET, FILM COATED ORAL at 21:00

## 2023-10-19 RX ADMIN — Medication 650 MILLIGRAM(S): at 21:56

## 2023-10-19 RX ADMIN — Medication 650 MILLIGRAM(S): at 21:01

## 2023-10-19 RX ADMIN — ISOSORBIDE MONONITRATE 30 MILLIGRAM(S): 60 TABLET, EXTENDED RELEASE ORAL at 14:59

## 2023-10-19 RX ADMIN — Medication 250 MILLIGRAM(S): at 11:18

## 2023-10-19 RX ADMIN — TAMSULOSIN HYDROCHLORIDE 0.8 MILLIGRAM(S): 0.4 CAPSULE ORAL at 21:00

## 2023-10-19 RX ADMIN — Medication 250 MILLIGRAM(S): at 08:47

## 2023-10-19 NOTE — CHART NOTE - NSCHARTNOTEFT_GEN_A_CORE
Electrophysiology Brief Post-Operative Note    I have personally seen and examined the patient.  I agree with the history and physical which I have reviewed and noted any changes below.      DEVICE COMPANY:  -Medtronic      PRE-OP DIAGNOSIS: Chronic systolic Heart Failure, LBBB    POST-OP DIAGNOSIS: Chronic systolic Heart Failure, LBBB    PROCEDURE:  Biventricular defibrillator implant    Physician: VAMSHI Gordon MD  Assistant: None    ANESTHESIA TYPE:  [  ]General Anesthesia  [X] Sedation  [X] Local/Regional    CONDITION  [  ] Critical  [  ] Serious  [  ]Fair  [X]Good    SPECIMENS REMOVED (IF APPLICABLE): None    IMPLANTS (IF APPLICABLE)  Biventricular Defibrillator implant    FINDINGS (see below)   -Successful biventricular defibrillator implant   -No immediate complications   -Estimated Blood Loss: 25  mL   -Contrast used: 40 cc    PLAN OF CARE  - vanco trough level  - redose Vancomyocin x1  - renal to assess need for Hemodialysis  - Chest X-ray portable now  - Chest X-ray PA/Lat tomorrow at 6am  - Device interrogation tomorrow in am  - Discontinue Heparin, Lovenox, and NOACS for 48 hours  - No anticoagulation unless discussed with EP attending      FOLLOW-UP  -Outpatient follow-up with Electrophysiology in 3-4 weeks     [address] [place] [address] ([address])[facility] [phone]

## 2023-10-19 NOTE — ASU PATIENT PROFILE, ADULT - FALL HARM RISK - RISK INTERVENTIONS

## 2023-10-19 NOTE — DISCHARGE NOTE PROVIDER - CARE PROVIDER_API CALL
Sang Gordon  Cardiovascular Disease  [address] [place] [address], [address]  [place] [address]  Phone: [phone]  Fax: [phone]  Established Patient  Follow Up Time: 1 month

## 2023-10-19 NOTE — DISCHARGE NOTE PROVIDER - NSDCMRMEDTOKEN_GEN_ALL_CORE_FT
alfuzosin 10 mg oral tablet, extended release: 1 tab(s) orally once a day  aspirin 81 mg oral delayed release capsule: 1 tab(s) orally once a day  Astepro 137 mcg/inh (0.1%) nasal spray: 2 spray(s) intranasally once a day as needed for  bronchospasm  clopidogrel 75 mg oral tablet: 1 tab(s) orally once a day  Entresto 49 mg-51 mg oral tablet: 1 tab(s) orally 2 times a day  ezetimibe 10 mg oral tablet: 1 tab(s) orally once a day  furosemide 20 mg oral tablet: taken for 4 days per week (only on dyas there is no dialysis)  ipratropium 42 mcg/inh (0.06%) nasal spray: 2 spray(s) intranasally once a day as needed for  bronchospasm  isosorbide mononitrate 30 mg oral tablet, extended release: 1 tab(s) orally once a day  lubiprostone 8 mcg oral capsule: 1 cap(s) orally 2 times a day  metOLazone 2.5 mg oral tablet: Taken for 4 days per week (only on days there is no dialysis)  Metoprolol Tartrate 25 mg oral tablet: 1 tab(s) orally 2 times a day  Multiple Vitamins oral tablet: 1 tab(s) orally once a day  Nitrostat 0.4 mg sublingual tablet: 1 tab(s) sublingual every 5 minutes, As Needed  Ozempic (1 mg dose) 4 mg/3 mL subcutaneous solution:   rosuvastatin 20 mg oral tablet: 1 tab(s) orally once a day (at bedtime)   alfuzosin 10 mg oral tablet, extended release: 1 tab(s) orally once a day  aspirin 81 mg oral delayed release capsule: 1 tab(s) orally once a day  Astepro 137 mcg/inh (0.1%) nasal spray: 2 spray(s) intranasally once a day as needed for  bronchospasm  atorvastatin 80 mg oral tablet: 1 tab(s) orally once a day (at bedtime)  clopidogrel 75 mg oral tablet: 1 tab(s) orally once a day  Entresto 49 mg-51 mg oral tablet: 1 tab(s) orally 2 times a day  ezetimibe 10 mg oral tablet: 1 tab(s) orally once a day  furosemide 20 mg oral tablet: 1 tab(s) orally once a day  ipratropium 42 mcg/inh (0.06%) nasal spray: 2 spray(s) intranasally once a day as needed for  bronchospasm  isosorbide mononitrate 30 mg oral tablet, extended release: 1 tab(s) orally once a day  lubiprostone 8 mcg oral capsule: 1 cap(s) orally 2 times a day  metOLazone 2.5 mg oral tablet: 1 tab(s) orally once a day  metoprolol tartrate 25 mg oral tablet: 1 tab(s) orally 2 times a day  Multiple Vitamins oral tablet: 1 tab(s) orally once a day  Nitrostat 0.4 mg sublingual tablet: 1 tab(s) sublingual every 5 minutes as needed for -

## 2023-10-19 NOTE — CONSULT NOTE ADULT - ASSESSMENT
Pt with ESRD on HD TTS, HTN, CHF, s/p AICD today.  Received Vanco post op.    ESRD - HD in pt tomorrow 3 hrs  renal diet,  received OP HD 10/17 and 10/18  renal diet, check phos  cont binders if on any as op    HTN - cont home meds    AICD - next dose of Vanco is to be given post HD  NExt OP HD on Sat

## 2023-10-19 NOTE — DISCHARGE NOTE PROVIDER - NSDCCPTREATMENT_GEN_ALL_CORE_FT
PRINCIPAL PROCEDURE  Procedure: Implantation of biv ICD  Findings and Treatment: - Please take Keflex 500 mg (Bactrim / doxycycline 100 mg) twice daily for 5 days.  - Do not drive or operate heavy machinery for 1 week   - Do not submerge in water (example: baths, swimming) for 1 month.  - Do not lift your left arm greater than shoulder height for 6 weeks.  - Do not lift anything heavier than 5-10 lbs with your right arm for 6 weeks.  - Any sudden swelling, redness, fever, discharge, or severe pain, call the Electrophysiology Office at [phone].     PRINCIPAL PROCEDURE  Procedure: Implantation of biv ICD  Findings and Treatment: - Do not drive or operate heavy machinery for 1 week   - Do not submerge in water (example: baths, swimming) for 1 month.  - Do not lift your left arm greater than shoulder height for 6 weeks.  - Do not lift anything heavier than 5-10 lbs with your right arm for 6 weeks.  - Any sudden swelling, redness, fever, discharge, or severe pain, call the Electrophysiology Office at [phone].

## 2023-10-19 NOTE — DISCHARGE NOTE PROVIDER - NSDCCPCAREPLAN_GEN_ALL_CORE_FT
PRINCIPAL DISCHARGE DIAGNOSIS  Diagnosis: S/P ICD (internal cardiac defibrillator) procedure  Assessment and Plan of Treatment:

## 2023-10-19 NOTE — DISCHARGE NOTE PROVIDER - HOSPITAL COURSE
Patient is a 73y Male with PMHx of CAD s/p PCI, HTN, HLD, ESRD on HD ( Tue/Thur/Sat), HFrEF n( LVEF 25-30%) with LBBB who presented to [facility] for elective  ICD implantation. On 10/19/2023 patient underwent successful right sided Medtronic BiV ICD implantation. Patient was monitored overnight. Post procedure, patient was evaluated by Nephro, who recommended HD  prior discharge. On POD 1 patient remains HD stable with no complaints. Examination of ICD pocket is C/D/I with no hematoma or erythema. Device interrogation reveals normal device function and CXR was negative for pneumothorax. Patient had HD session prior to discharge.  Patient is being DC home in stable condition. Patient is a 73y Male with PMHx of CAD s/p PCI, HTN, HLD, ESRD on HD ( Tue/Thur/Sat), HFrEF n( LVEF 25-30%) with LBBB who presented to [facility] for elective  ICD implantation. On 10/19/2023 patient underwent successful right sided Medtronic BiV ICD implantation. Patient was monitored overnight. Post procedure, patient was evaluated by Nephro, who recommended HD  prior discharge. On POD 1 patient remains HD stable with no complaints. Examination of right sided ICD pocket is C/D/I with no hematoma or erythema. Device interrogation reveals normal device function and CXR was negative for pneumothorax. Patient had HD session prior to discharge.  Patient is being DC home in stable condition. Patient is a 73y Male with PMHx of CAD s/p PCI, HTN, HLD, ESRD on HD ( Tue/Thur/Sat), HFrEF n( LVEF 25-30%) with LBBB who presented to [facility] for elective  ICD implantation. On 10/19/2023 patient underwent successful right sided Medtronic BiV ICD implantation. Patient was monitored overnight. Post procedure, patient was evaluated by Nephro, who recommended HD  prior discharge. On POD 1 patient remains HD stable with no complaints. Examination of right sided ICD pocket is C/D/I with no hematoma or erythema. Device interrogation reveals normal device function and CXR was negative for pneumothorax. Patient had HD session prior to discharge.  Patient is being DC home in stable condition.

## 2023-10-19 NOTE — DISCHARGE NOTE PROVIDER - NSDCFUSCHEDAPPT_GEN_ALL_CORE_FT
Edy Singer  [facility]  UROLOGY [address]  Scheduled Appointment: 11/09/2023    [facility]  ELECTROPH [address]  Scheduled Appointment: 11/24/2023

## 2023-10-19 NOTE — CONSULT NOTE ADULT - SUBJECTIVE AND OBJECTIVE BOX
NEPHROLOGY CONSULTATION NOTE    Patient is a 73y Male whom presented to the hospital for AICD placement.    Pt with ESRD on HD TTS, HTN admitted for an elective AICD placement, done today.    No SOB, feels good  Had HD Tues and Wedn.      PAST MEDICAL & SURGICAL HISTORY:  HTN (hypertension)      Kidney failure      Pneumonia, pneumococcal      Anemia      DM (diabetes mellitus)      Myocardial infarction      AV fistula      History of right common carotid artery stent placement      H/O colonoscopy  5-10 years ago      S/P cataract surgery        Allergies:  sulfa drugs (Anaphylaxis; Swelling)    Home Medications Reviewed  Hospital Medications:   MEDICATIONS  (STANDING):  aspirin enteric coated 81 milliGRAM(s) Oral daily  atorvastatin 80 milliGRAM(s) Oral at bedtime  furosemide    Tablet 20 milliGRAM(s) Oral daily  isosorbide   mononitrate ER Tablet (IMDUR) 30 milliGRAM(s) Oral daily  metolazone 2.5 milliGRAM(s) Oral daily  metoprolol tartrate 25 milliGRAM(s) Oral two times a day  sacubitril 49 mG/valsartan 51 mG 1 Tablet(s) Oral two times a day  tamsulosin 0.8 milliGRAM(s) Oral at bedtime      SOCIAL HISTORY:  Denies ETOH,Smoking,   FAMILY HISTORY:        REVIEW OF SYSTEMS:  CONSTITUTIONAL: No weakness, fevers or chills  EYES/ENT: No visual changes;  No vertigo or throat pain   NECK: No pain or stiffness  RESPIRATORY: No cough, wheezing, hemoptysis; No shortness of breath  CARDIOVASCULAR: No chest pain or palpitations.  GASTROINTESTINAL: No abdominal or epigastric pain. No nausea, vomiting, or hematemesis;   GENITOURINARY: No dysuria, frequency, or hematuria  NEUROLOGICAL: No numbness or weakness  SKIN: No itching, burning, rashes, or lesions   VASCULAR: No bilateral lower extremity edema.   All other review of systems is negative unless indicated above.    VITALS:  T(F): 97.4 (10-19-23 @ 16:05), Max: 97.4 (10-19-23 @ 16:05)  HR: 72 (10-19-23 @ 16:05)  BP: 145/62 (10-19-23 @ 16:05)  RR: 15 (10-19-23 @ 16:05)  SpO2: 97% (10-19-23 @ 16:05)    Height (cm): 180.3 (10-19 @ 13:36)  Weight (kg): 88.3 (10-19 @ 13:36)  BMI (kg/m2): 27.2 (10-19 @ 13:36)  BSA (m2): 2.08 (10-19 @ 13:36)    I&O's Detail        PHYSICAL EXAM:  Constitutional: NAD  HEENT: anicteric sclera  Neck: No JVD  Respiratory: CTA  Cardiovascular: S1, S2, RRR  Gastrointestinal: BS+, soft, NT/ND  Extremities: No cyanosis or clubbing. No peripheral edema  Neurological: A/O x 3, no focal deficits  Psychiatric: Normal mood, normal affect  : No CVA tenderness. No albarado.   Skin: No rashes  Vascular Access: AVF    LABS:        Creatinine Trend: 4.2 <--    Urine Studies:              RADIOLOGY & ADDITIONAL STUDIES:    < from: CT Chest w/ IV Cont (10.09.23 @ 12:10) >  Impression:    Short segment narrowing of the left subclavian vein is noted (301/16).   Normal caliber left brachiocephalic vein.    No mediastinal or pulmonary parenchymal abnormalities.    Trace left pleural effusion.    < end of copied text >

## 2023-10-20 ENCOUNTER — TRANSCRIPTION ENCOUNTER (OUTPATIENT)
Age: 73
End: 2023-10-20

## 2023-10-20 VITALS
DIASTOLIC BLOOD PRESSURE: 72 MMHG | OXYGEN SATURATION: 97 % | SYSTOLIC BLOOD PRESSURE: 154 MMHG | TEMPERATURE: 98 F | RESPIRATION RATE: 20 BRPM | HEART RATE: 67 BPM

## 2023-10-20 LAB
GLUCOSE BLDC GLUCOMTR-MCNC: 126 MG/DL — HIGH (ref 70–99)
GLUCOSE BLDC GLUCOMTR-MCNC: 126 MG/DL — HIGH (ref 70–99)
GLUCOSE BLDC GLUCOMTR-MCNC: 147 MG/DL — HIGH (ref 70–99)
GLUCOSE BLDC GLUCOMTR-MCNC: 147 MG/DL — HIGH (ref 70–99)
PHOSPHATE SERPL-MCNC: 4.2 MG/DL — SIGNIFICANT CHANGE UP (ref 2.1–4.9)
PHOSPHATE SERPL-MCNC: 4.2 MG/DL — SIGNIFICANT CHANGE UP (ref 2.1–4.9)
VANCOMYCIN TROUGH SERPL-MCNC: 8 UG/ML — SIGNIFICANT CHANGE UP (ref 5–10)
VANCOMYCIN TROUGH SERPL-MCNC: 8 UG/ML — SIGNIFICANT CHANGE UP (ref 5–10)

## 2023-10-20 PROCEDURE — 99238 HOSP IP/OBS DSCHRG MGMT 30/<: CPT

## 2023-10-20 PROCEDURE — 71046 X-RAY EXAM CHEST 2 VIEWS: CPT | Mod: 26

## 2023-10-20 PROCEDURE — 93010 ELECTROCARDIOGRAM REPORT: CPT

## 2023-10-20 PROCEDURE — 99024 POSTOP FOLLOW-UP VISIT: CPT

## 2023-10-20 RX ORDER — ATORVASTATIN CALCIUM 80 MG/1
1 TABLET, FILM COATED ORAL
Qty: 0 | Refills: 0 | DISCHARGE
Start: 2023-10-20

## 2023-10-20 RX ORDER — FUROSEMIDE 40 MG
3 TABLET ORAL
Refills: 0 | DISCHARGE
Start: 2023-10-20

## 2023-10-20 RX ORDER — METOLAZONE 5 MG/1
1 TABLET ORAL
Refills: 0 | DISCHARGE
Start: 2023-10-20

## 2023-10-20 RX ORDER — NITROGLYCERIN 6.5 MG
1 CAPSULE, EXTENDED RELEASE ORAL
Qty: 1 | Refills: 3
Start: 2023-10-20 | End: 2024-02-16

## 2023-10-20 RX ORDER — LANOLIN ALCOHOL/MO/W.PET/CERES
5 CREAM (GRAM) TOPICAL ONCE
Refills: 0 | Status: COMPLETED | OUTPATIENT
Start: 2023-10-20 | End: 2023-10-20

## 2023-10-20 RX ORDER — METOPROLOL TARTRATE 50 MG
1 TABLET ORAL
Qty: 0 | Refills: 0 | DISCHARGE
Start: 2023-10-20

## 2023-10-20 RX ORDER — SEMAGLUTIDE 0.68 MG/ML
0 INJECTION, SOLUTION SUBCUTANEOUS
Qty: 0 | Refills: 0 | DISCHARGE

## 2023-10-20 RX ORDER — CLOPIDOGREL BISULFATE 75 MG/1
1 TABLET, FILM COATED ORAL
Qty: 30 | Refills: 0
Start: 2023-10-20 | End: 2023-11-18

## 2023-10-20 RX ORDER — CLOPIDOGREL BISULFATE 75 MG/1
1 TABLET, FILM COATED ORAL
Refills: 0 | DISCHARGE

## 2023-10-20 RX ORDER — NITROGLYCERIN 6.5 MG
1 CAPSULE, EXTENDED RELEASE ORAL
Qty: 0 | Refills: 0 | DISCHARGE

## 2023-10-20 RX ORDER — METOPROLOL TARTRATE 50 MG
1 TABLET ORAL
Qty: 0 | Refills: 0 | DISCHARGE

## 2023-10-20 RX ORDER — FUROSEMIDE 40 MG
0 TABLET ORAL
Refills: 0 | DISCHARGE

## 2023-10-20 RX ORDER — LISINOPRIL 2.5 MG/1
2.5 TABLET ORAL
Qty: 90 | Refills: 3
Start: 2023-10-20 | End: 2024-10-13

## 2023-10-20 RX ORDER — ROSUVASTATIN CALCIUM 5 MG/1
1 TABLET ORAL
Refills: 0 | DISCHARGE

## 2023-10-20 RX ORDER — METOLAZONE 5 MG/1
0 TABLET ORAL
Refills: 0 | DISCHARGE

## 2023-10-20 RX ORDER — VANCOMYCIN HCL 1 G
1000 VIAL (EA) INTRAVENOUS ONCE
Refills: 0 | Status: COMPLETED | OUTPATIENT
Start: 2023-10-20 | End: 2023-10-20

## 2023-10-20 RX ORDER — ACETAMINOPHEN 500 MG
650 TABLET ORAL EVERY 6 HOURS
Refills: 0 | Status: DISCONTINUED | OUTPATIENT
Start: 2023-10-20 | End: 2023-10-20

## 2023-10-20 RX ADMIN — SACUBITRIL AND VALSARTAN 1 TABLET(S): 24; 26 TABLET, FILM COATED ORAL at 05:33

## 2023-10-20 RX ADMIN — Medication 81 MILLIGRAM(S): at 11:08

## 2023-10-20 RX ADMIN — SACUBITRIL AND VALSARTAN 1 TABLET(S): 24; 26 TABLET, FILM COATED ORAL at 17:27

## 2023-10-20 RX ADMIN — Medication 650 MILLIGRAM(S): at 05:33

## 2023-10-20 RX ADMIN — Medication 650 MILLIGRAM(S): at 03:32

## 2023-10-20 RX ADMIN — Medication 650 MILLIGRAM(S): at 11:08

## 2023-10-20 RX ADMIN — Medication 25 MILLIGRAM(S): at 05:33

## 2023-10-20 RX ADMIN — Medication 650 MILLIGRAM(S): at 17:26

## 2023-10-20 RX ADMIN — ISOSORBIDE MONONITRATE 30 MILLIGRAM(S): 60 TABLET, EXTENDED RELEASE ORAL at 11:08

## 2023-10-20 RX ADMIN — Medication 650 MILLIGRAM(S): at 05:32

## 2023-10-20 RX ADMIN — Medication 650 MILLIGRAM(S): at 01:42

## 2023-10-20 RX ADMIN — Medication 250 MILLIGRAM(S): at 16:47

## 2023-10-20 RX ADMIN — Medication 5 MILLIGRAM(S): at 01:42

## 2023-10-20 RX ADMIN — CLOPIDOGREL BISULFATE 75 MILLIGRAM(S): 75 TABLET, FILM COATED ORAL at 11:08

## 2023-10-20 RX ADMIN — Medication 25 MILLIGRAM(S): at 17:26

## 2023-10-20 RX ADMIN — Medication 20 MILLIGRAM(S): at 05:33

## 2023-10-20 NOTE — PROGRESS NOTE ADULT - ASSESSMENT
A/P   Patient  s/p MDT BIV ICD implant    - CXR shows leads in good position.  No ptx.  Pt may eat  - Device interrogation done.  No events.  Device is functioning properly  - Pt cannot raise left arm above shoulder level x 6 weeks  - Pt cannot lift >5 pounds with left arm x 6 weeks  - no shower x 1 week  - no bath/swimming x 6 weeks  - ok to discharge home today after HD and last dose of antibiotics  - Follow up in 1 week with EP

## 2023-10-20 NOTE — DISCHARGE NOTE NURSING/CASE MANAGEMENT/SOCIAL WORK - NSDCPEFALRISK_GEN_ALL_CORE
For information on Fall & Injury Prevention, visit: [email or web address] OR  [email or web address] OR  https://www.cdc.gov/steadi/patient.html

## 2023-10-20 NOTE — DISCHARGE NOTE NURSING/CASE MANAGEMENT/SOCIAL WORK - PATIENT PORTAL LINK FT
You can access the FollowMyHealth Patient Portal offered by [facility] by registering at the following website: [email or web address]. By joining [employer]’s FollowMyHealth portal, you will also be able to view your health information using other applications (apps) compatible with our system.

## 2023-10-20 NOTE — PROGRESS NOTE ADULT - ASSESSMENT
Pt with ESRD on HD TTS, HTN, CHF, s/p AICD yesterday.  Received Vanco post op.    ESRD - HD today   3 hrs 2 K bath  UF 2 L as nicole  renal diet,  received OP HD 10/17 and 10/18  renal diet, check phos  cont binders if on any as op  HTN - cont home meds    AICD - next dose of Vanco is to be given post HD  NExt OP HD on Sat

## 2023-10-20 NOTE — PROGRESS NOTE ADULT - SUBJECTIVE AND OBJECTIVE BOX
ASSESSMENT:  Nausea/ vomiting--- likely Viral Gastroenteritis  Intermittent abdominal generalized pains-- likely cramping/ viral    PLAN:  Push fluids--- popsicles, ice etc.  Recommend diet modifications as discussed. Recommend no dairy and BRAT diet (if diarrhea present); Push bland foods such as soup broth, crackers, dry toast, jello etc.  Can advance diet as improves. Meds prescribed or recommended at this time-- zofran to use as needed for nausea  Stool studies-- none    Patient should expect gradual improvement of symptoms with diet modifications and time. Explained that at this time based on our discussion and exam today the symptoms appears to be viral and NOT bacterial. The symptoms DO NOT seem surgical such as cholecystitis, appendicitis etc as discussed. Symptoms should gradually improve over 1-2 days. If your current symptoms are not improving in 1-2 days then must recheck in walk in care or primary care doctor's office. BUT if any WORSENING of symptoms, onset of NEW SYMPTOMS such as fevers, dizziness OR changes in nature (severity, frequency, location) of the abdominal pains then must seek medical attention immediately in ER to rule out any surgical possibilities of this abdominal pain. Overall--- any worsening means that you need to be re-examined . You may need further workup such as labs, CT imaging etc, IV fluids etc to manage the worsening symptoms.
Nephrology progress note    Patient is seen and examined, events over the last 24 h noted .  Feels good. No SOB  Had AICD interrogated  Allergies:  sulfa drugs (Anaphylaxis; Swelling)    Hospital Medications:   MEDICATIONS  (STANDING):  acetaminophen     Tablet .. 650 milliGRAM(s) Oral every 6 hours  aspirin enteric coated 81 milliGRAM(s) Oral daily  atorvastatin 80 milliGRAM(s) Oral at bedtime  clopidogrel Tablet 75 milliGRAM(s) Oral daily  furosemide    Tablet 20 milliGRAM(s) Oral daily  isosorbide   mononitrate ER Tablet (IMDUR) 30 milliGRAM(s) Oral daily  metolazone 2.5 milliGRAM(s) Oral daily  metoprolol tartrate 25 milliGRAM(s) Oral two times a day  sacubitril 49 mG/valsartan 51 mG 1 Tablet(s) Oral two times a day  tamsulosin 0.8 milliGRAM(s) Oral at bedtime        VITALS:  T(F): 97.2 (10-20-23 @ 07:46), Max: 97.5 (10-20-23 @ 00:17)  HR: 71 (10-20-23 @ 07:46)  BP: 142/65 (10-20-23 @ 07:46)  RR: 18 (10-20-23 @ 07:46)  SpO2: 96% (10-20-23 @ 05:02)  Wt(kg): --    Height (cm): 180.3 (10-19 @ 13:36)  Weight (kg): 88.3 (10-19 @ 13:36)  BMI (kg/m2): 27.2 (10-19 @ 13:36)  BSA (m2): 2.08 (10-19 @ 13:36)    PHYSICAL EXAM:  Constitutional: NAD  HEENT: anicteric sclera  Neck: No JVD  Respiratory: CTAB, no wheezes, rales or rhonchi  Cardiovascular: S1, S2, RRR  Gastrointestinal: BS+, soft, NT/ND  Extremities: No peripheral edema  Neurological: A/O x 3, no focal deficits  : No CVA tenderness. No albarado.   Skin: No rashes  Vascular Access: AVF    LABS:    Phos  4.2     10-20          Urine Studies:      RADIOLOGY & ADDITIONAL STUDIES:  < from: Xray Chest 2 Views PA/Lat (10.20.23 @ 08:59) >    Impression:    Right-sided ICD with leads terminating in the right atrium, right   ventricle, and left ventricle.    No radiographic evidence of acute cardiopulmonary disease.    < end of copied text >  
INTERVAL HPI/OVERNIGHT EVENTS:    Patient s/p MDT BiV- ICD implant  No events over night. Pt without complaints    MEDICATIONS  (STANDING):  acetaminophen     Tablet .. 650 milliGRAM(s) Oral every 6 hours  aspirin enteric coated 81 milliGRAM(s) Oral daily  atorvastatin 80 milliGRAM(s) Oral at bedtime  clopidogrel Tablet 75 milliGRAM(s) Oral daily  furosemide    Tablet 20 milliGRAM(s) Oral daily  isosorbide   mononitrate ER Tablet (IMDUR) 30 milliGRAM(s) Oral daily  metolazone 2.5 milliGRAM(s) Oral daily  metoprolol tartrate 25 milliGRAM(s) Oral two times a day  sacubitril 49 mG/valsartan 51 mG 1 Tablet(s) Oral two times a day  tamsulosin 0.8 milliGRAM(s) Oral at bedtime    MEDICATIONS  (PRN):  acetaminophen     Tablet .. 650 milliGRAM(s) Oral every 6 hours PRN Mild Pain (1 - 3)    Allergies    sulfa drugs (Anaphylaxis; Swelling)    Intolerances      Vital Signs Last 24 Hrs  T(C): 36.2 (20 Oct 2023 07:46), Max: 36.4 (20 Oct 2023 00:17)  T(F): 97.2 (20 Oct 2023 07:46), Max: 97.5 (20 Oct 2023 00:17)  HR: 71 (20 Oct 2023 07:46) (70 - 77)  BP: 142/65 (20 Oct 2023 07:46) (139/68 - 164/68)  BP(mean): 93 (20 Oct 2023 07:46) (93 - 98)  RR: 18 (20 Oct 2023 07:46) (13 - 18)  SpO2: 96% (20 Oct 2023 05:02) (96% - 99%)    Parameters below as of 20 Oct 2023 05:02  Patient On (Oxygen Delivery Method): room air    Wound healing well; No hematoma; no bleeding    RADIOLOGY & ADDITIONAL TESTS:  < from: Xray Chest 2 Views PA/Lat (10.20.23 @ 08:59) >  Impression:    Right-sided ICD with leads terminating in the right atrium, right   ventricle, and left ventricle.    No radiographic evidence of acute cardiopulmonary disease.    < end of copied text >

## 2023-10-25 DIAGNOSIS — Z99.2 DEPENDENCE ON RENAL DIALYSIS: ICD-10-CM

## 2023-10-25 DIAGNOSIS — I50.22 CHRONIC SYSTOLIC (CONGESTIVE) HEART FAILURE: ICD-10-CM

## 2023-10-25 DIAGNOSIS — Z95.5 PRESENCE OF CORONARY ANGIOPLASTY IMPLANT AND GRAFT: ICD-10-CM

## 2023-10-25 DIAGNOSIS — Z88.2 ALLERGY STATUS TO SULFONAMIDES: ICD-10-CM

## 2023-10-25 DIAGNOSIS — I25.10 ATHEROSCLEROTIC HEART DISEASE OF NATIVE CORONARY ARTERY WITHOUT ANGINA PECTORIS: ICD-10-CM

## 2023-10-25 DIAGNOSIS — I42.9 CARDIOMYOPATHY, UNSPECIFIED: ICD-10-CM

## 2023-10-25 DIAGNOSIS — I48.91 UNSPECIFIED ATRIAL FIBRILLATION: ICD-10-CM

## 2023-10-25 DIAGNOSIS — I25.2 OLD MYOCARDIAL INFARCTION: ICD-10-CM

## 2023-10-25 DIAGNOSIS — E78.5 HYPERLIPIDEMIA, UNSPECIFIED: ICD-10-CM

## 2023-10-25 DIAGNOSIS — N18.6 END STAGE RENAL DISEASE: ICD-10-CM

## 2023-10-25 DIAGNOSIS — D63.1 ANEMIA IN CHRONIC KIDNEY DISEASE: ICD-10-CM

## 2023-10-25 DIAGNOSIS — I13.2 HYPERTENSIVE HEART AND CHRONIC KIDNEY DISEASE WITH HEART FAILURE AND WITH STAGE 5 CHRONIC KIDNEY DISEASE, OR END STAGE RENAL DISEASE: ICD-10-CM

## 2023-10-25 DIAGNOSIS — I44.7 LEFT BUNDLE-BRANCH BLOCK, UNSPECIFIED: ICD-10-CM

## 2023-11-09 ENCOUNTER — APPOINTMENT (OUTPATIENT)
Dept: UROLOGY | Facility: CLINIC | Age: 73
End: 2023-11-09
Payer: MEDICARE

## 2023-11-09 VITALS
OXYGEN SATURATION: 98 % | HEIGHT: 71 IN | TEMPERATURE: 98 F | HEART RATE: 71 BPM | WEIGHT: 192 LBS | SYSTOLIC BLOOD PRESSURE: 116 MMHG | RESPIRATION RATE: 18 BRPM | BODY MASS INDEX: 26.88 KG/M2 | DIASTOLIC BLOOD PRESSURE: 62 MMHG

## 2023-11-09 PROCEDURE — 99213 OFFICE O/P EST LOW 20 MIN: CPT

## 2023-11-20 ENCOUNTER — APPOINTMENT (OUTPATIENT)
Dept: ELECTROPHYSIOLOGY | Facility: CLINIC | Age: 73
End: 2023-11-20
Payer: MEDICARE

## 2023-11-20 VITALS
SYSTOLIC BLOOD PRESSURE: 120 MMHG | HEART RATE: 76 BPM | HEIGHT: 71 IN | BODY MASS INDEX: 27.86 KG/M2 | DIASTOLIC BLOOD PRESSURE: 60 MMHG | RESPIRATION RATE: 18 BRPM | TEMPERATURE: 97.1 F | WEIGHT: 199 LBS

## 2023-11-20 DIAGNOSIS — Z48.89 ENCOUNTER FOR OTHER SPECIFIED SURGICAL AFTERCARE: ICD-10-CM

## 2023-11-20 PROCEDURE — 93284 PRGRMG EVAL IMPLANTABLE DFB: CPT

## 2023-11-20 PROCEDURE — 99024 POSTOP FOLLOW-UP VISIT: CPT

## 2023-11-21 PROBLEM — Z48.89 ENCOUNTER FOR POSTOPERATIVE WOUND CHECK: Status: ACTIVE | Noted: 2023-11-21

## 2023-11-21 RX ORDER — METOPROLOL TARTRATE 75 MG/1
TABLET, FILM COATED ORAL
Refills: 0 | Status: DISCONTINUED | COMMUNITY
End: 2023-11-21

## 2023-11-24 ENCOUNTER — APPOINTMENT (OUTPATIENT)
Dept: ELECTROPHYSIOLOGY | Facility: CLINIC | Age: 73
End: 2023-11-24

## 2023-12-07 NOTE — ED ADULT NURSE NOTE - CAS DISCH ACCOMP BY
Take amiodarone 400 mg twice daily through 12/8/2023, then on 12/9/2023 decrease to 200 mg once daily
Self/RN

## 2024-01-03 RX ORDER — SACUBITRIL AND VALSARTAN 24; 26 MG/1; MG/1
1 TABLET, FILM COATED ORAL
Refills: 0 | DISCHARGE

## 2024-01-03 RX ORDER — ISOSORBIDE MONONITRATE 60 MG/1
1 TABLET, EXTENDED RELEASE ORAL
Refills: 0 | DISCHARGE

## 2024-01-03 RX ORDER — AZELASTINE 137 UG/1
2 SPRAY, METERED NASAL
Refills: 0 | DISCHARGE

## 2024-01-03 RX ORDER — ASPIRIN/CALCIUM CARB/MAGNESIUM 324 MG
1 TABLET ORAL
Qty: 0 | Refills: 0 | DISCHARGE

## 2024-01-03 RX ORDER — EZETIMIBE 10 MG/1
1 TABLET ORAL
Qty: 0 | Refills: 0 | DISCHARGE

## 2024-01-03 RX ORDER — IPRATROPIUM BROMIDE 21 MCG
2 AEROSOL, SPRAY (ML) NASAL
Refills: 0 | DISCHARGE

## 2024-01-03 RX ORDER — ALFUZOSIN HYDROCHLORIDE 10 MG/1
1 TABLET, EXTENDED RELEASE ORAL
Qty: 0 | Refills: 0 | DISCHARGE

## 2024-01-03 RX ORDER — LUBIPROSTONE 24 UG/1
1 CAPSULE, GELATIN COATED ORAL
Refills: 0 | DISCHARGE

## 2024-01-04 VITALS
RESPIRATION RATE: 16 BRPM | OXYGEN SATURATION: 98 % | DIASTOLIC BLOOD PRESSURE: 83 MMHG | SYSTOLIC BLOOD PRESSURE: 170 MMHG | WEIGHT: 199.08 LBS | HEIGHT: 71 IN | HEART RATE: 68 BPM

## 2024-01-04 NOTE — H&P CARDIOLOGY - HISTORY OF PRESENT ILLNESS
DAWSON FALLON is here for [facility], need renal transplant. + NST      History of Present Illness       Cardio: Dr. Calvillo    [facility]:  hypertension, hyperlipidemia, Anemia, CKD on HD since 2022, old myocardial infarction, coronary artery disease, s/p PCI distal RCA 2023, cardiomyopathy, chronic systolic heart failure NYHA III, IVCD  ms. patient has left arm AV fistula. He had prior right IJ dialysis catheter.  He is on GDMT.  He has no chest pain, no shortness of breath at rest, mild to moderate dyspnea on exertion, no orthopnea, no PND. He denies dizziness, lightheadedness and syncope. He has mild to moderate exertional symptoms.     Cardiology Summary        ECG: (2023): Sinus rhythm at 73 bpm, IVCD with QRS of 132 msec, Q wave in inferior leads, non-specific T wave abnormalities.    Stress Test: (2023): Nuclear scan. Lexiscan. Moderate sized moderate fixed defect in the inferior segment. Gated images showed hypokinesis, with severe hypokinesis in the inferior apical segment. LVEF 32%.    Echo: (2023): 2D echo. No LVH. Inferoapical hypokinesis. EF 25-30%. Left atrial enlargement.   (2022): 2D echo. LVEF 45-50%. Mildly decreased LV global systolic function. Global cardiomyopathy. Moderately to severely increased LV cavity size. Mild LAE. Mild PHI. Moderate LAE. Moderate MR.    CT/MRI: (2023): Cardiac MRI without gadolinium. LV dilated. No LVH. LVEF 10%. RVEF 24%. Large left and moderate right pleural effusion. [place] dyskinetic. Study not designed to detect LGE.    Viability/Other Nuclear: (2022): MUGA scan. LVEF 42%, dilated LV with mild hypokinesis.    Cardiac Cath/PCI: (2022): Coronary angiogram, single vessel disease, 99% distal RCA. Underwent successful stent with balloon angioplasty to 99% distal RCA.      Active Problems  Abnormal renal function (593.9) (N28.9)  Benign localized hyperplasia of prostate (600.20) (N40.0)  Benign localized hyperplasia of prostate with urinary obstruction (600.21,599.69)  (N40.1,N13.8)  Cardiomyopathy, dilated (425.4) (I42.0)  Chronic systolic congestive heart failure, NYHA class 3 (428.22,428.0) (I50.22)  Encounter for adjustment of cardiac resynchronization therapy defibrillator (CRT-D)  (V53.32) (Z45.02)  Encounter for postoperative wound check (V58.49) (Z48.89)  High cholesterol (272.0) (E78.00)  IVCD (intraventricular conduction defect) (426.6) (I45.4)  Urinary frequency (788.41) (R35.0)  Vein stenosis (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.2) (I87.1)          Surgical History  History of Arteriovenous graft fistula creation procedure  History of Cath Stent Placement Number Of Stents Placed  History of Oral Surgery Tooth Extraction            Family History    Family history of  : Father  Family history of cardiac disorder (V17.49) (Z82.49) : Mother  Family history of diabetes mellitus (V18.0) (Z83.3) : Mother, Father  Family history of liver cancer (V16.0) (Z80.0) : Father  Family history of liver cancer (V16.0) (Z80.0) : Father  Family history of malignant neoplasm (V16.9) (Z80.9) : Father  Family history of malignant neoplasm of breast (V16.3) (Z80.3) : Mother, Father              Social History    Does not use illicit drugs (V49.89) (Z78.9)  Former smoker (V15.82) (Z87.891)  No alcohol use              Current Meds   · Alfuzosin HCl ER 10 MG Oral Tablet Extended Release 24 Hour; TAKE ONE TABLET BY  MOUTH DAILY   · Aspirin 81 81 MG Oral Tablet Delayed Release; TAKE 1 TABLET DAILY   · Azelastine HCl - 137 MCG/SPRAY Nasal Solution; SPRAY 2 SPRAYS INTO EACH  NOSTRIL TWICE A DAY   · Claritin 10 MG Oral Capsule; TAKE 1 CAPSULE Daily   · Clopidogrel Bisulfate TABS; TAKE 1 TABLET DAILY AT BEDTIME   · Entresto 49-51 MG Oral Tablet; TAKE 1 TABLET BY MOUTH TWICE A DAY   · Ezetimibe 10 MG Oral Tablet; Take 1 tablet daily   · Furosemide TABS; TAKE 3 TABLET  no dialysis day   4 days per week  total of 60 mg   · Isosorbide Mononitrate ER 30 MG Oral Tablet Extended Release 24 Hour; TAKE 1  TABLET BY MOUTH EVERY DAY   · Lubiprostone 8 MCG Oral Capsule; 1 CAPSULE WITH FOOD AND WATER ORALLY  TWICE A DAY   · metOLazone 2.5 MG Oral Tablet; 1 TABLET ORALLY ONCE A DAY ON NON DIALYSIS  DAYS 90 DAYS   · Metoprolol Tartrate 25 MG Oral Tablet; TAKE 1 TABLET TWICE DAILY   · Nitrostat 0.4 MG Sublingual Tablet Sublingual   · Ozempic (0.25 or 0.5 MG/DOSE) SOPN   · raNITIdine HCl CAPS; TAKE 1 CAPSULE   4 days per week non dialysis days   · Rosuvastatin Calcium 20 MG Oral Tablet   · Vitamin D CAPS; TAKE 1 CAPSULE Daily DIALYBITE  800 MG    Allergies    Sulfa Drugs      Pre cath note:    indication:  [ ] STEMI                [ ] NSTEMI                 [ ] Acute coronary syndrome                         [ ]Unstable Angina   [ ] high risk  [ ] intermediate risk  [ ] low risk                         [ ] Stable Angina     non-invasive testing: + NST Lexiscan                         Date: 2023      result: [ ] high risk  [x ] intermediate risk  [ ] low risk    Anti- Anginal medications:                        [ ] not used                       [x ] used:  [ ]CCB  [x ] BB  [ x] Nitrate [ ] Ranexa                [ ] not used but strong indication not to use    Ejection Fraction                       [ ] <29            [x ] 30-39%   [ ] 40-49%     [ ]>50%    CHF                       [ ] active (within last 14 days on meds   [ x] Chronic (on meds but no exacerbation)    COPD                        [ ] mild (on chronic bronchodilators)  [ ] moderate (on chronic steroid therapy)      [ ] severe (indication for home O2 or PACO2 >50)    Other risk factors:                         [x ] Previous MI                       [ ] CVA/ stroke                      [ ] carotid stent/ CEA                      [ ] PVD/PAD- (arterial aneurysm, non-palpable pulses, tortuous vessel with inability to insert catheter, infra-renal dissection, renal or subclavian artery stenosis)                      [x ] diabetic                      [ ] previous CABG                      [x ] Renal Failure     Adjusted CathPCI Bleeding Event Risk:   %    RIGHT RADIAL ARTERY EVALUATION:  DYANA TEST: [ ] Negative          [ ] Positive    IVF: 250cc NS bolus pre-cath hydration [ ]          DAWSON FALLON is here for [facility], need renal transplant. + NST      History of Present Illness       Cardio: Dr. Calvillo    [facility]:  hypertension, hyperlipidemia, Anemia, CKD on HD since 2022, old myocardial infarction, coronary artery disease, s/p PCI distal RCA 2023, cardiomyopathy, chronic systolic heart failure NYHA III, IVCD  ms. patient has left arm AV fistula. He had prior right IJ dialysis catheter.  He is on GDMT.  He has no chest pain, no shortness of breath at rest, mild to moderate dyspnea on exertion, no orthopnea, no PND. He denies dizziness, lightheadedness and syncope. He has mild to moderate exertional symptoms.     Cardiology Summary        ECG: (2023): Sinus rhythm at 73 bpm, IVCD with QRS of 132 msec, Q wave in inferior leads, non-specific T wave abnormalities.    Stress Test: (2023): Nuclear scan. Lexiscan. Moderate sized moderate fixed defect in the inferior segment. Gated images showed hypokinesis, with severe hypokinesis in the inferior apical segment. LVEF 32%.    Echo: (2023): 2D echo. No LVH. Inferoapical hypokinesis. EF 25-30%. Left atrial enlargement.   (2022): 2D echo. LVEF 45-50%. Mildly decreased LV global systolic function. Global cardiomyopathy. Moderately to severely increased LV cavity size. Mild LAE. Mild PHI. Moderate LAE. Moderate MR.    CT/MRI: (2023): Cardiac MRI without gadolinium. LV dilated. No LVH. LVEF 10%. RVEF 24%. Large left and moderate right pleural effusion. [place] dyskinetic. Study not designed to detect LGE.    Viability/Other Nuclear: (2022): MUGA scan. LVEF 42%, dilated LV with mild hypokinesis.    Cardiac Cath/PCI: (2022): Coronary angiogram, single vessel disease, 99% distal RCA. Underwent successful stent with balloon angioplasty to 99% distal RCA.      Active Problems  Abnormal renal function (593.9) (N28.9)  Benign localized hyperplasia of prostate (600.20) (N40.0)  Benign localized hyperplasia of prostate with urinary obstruction (600.21,599.69)  (N40.1,N13.8)  Cardiomyopathy, dilated (425.4) (I42.0)  Chronic systolic congestive heart failure, NYHA class 3 (428.22,428.0) (I50.22)  Encounter for adjustment of cardiac resynchronization therapy defibrillator (CRT-D)  (V53.32) (Z45.02)  Encounter for postoperative wound check (V58.49) (Z48.89)  High cholesterol (272.0) (E78.00)  IVCD (intraventricular conduction defect) (426.6) (I45.4)  Urinary frequency (788.41) (R35.0)  Vein stenosis (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.2) (I87.1)          Surgical History  History of Arteriovenous graft fistula creation procedure  History of Cath Stent Placement Number Of Stents Placed  History of Oral Surgery Tooth Extraction            Family History    Family history of  : Father  Family history of cardiac disorder (V17.49) (Z82.49) : Mother  Family history of diabetes mellitus (V18.0) (Z83.3) : Mother, Father  Family history of liver cancer (V16.0) (Z80.0) : Father  Family history of liver cancer (V16.0) (Z80.0) : Father  Family history of malignant neoplasm (V16.9) (Z80.9) : Father  Family history of malignant neoplasm of breast (V16.3) (Z80.3) : Mother, Father              Social History    Does not use illicit drugs (V49.89) (Z78.9)  Former smoker (V15.82) (Z87.891)  No alcohol use              Current Meds   · Alfuzosin HCl ER 10 MG Oral Tablet Extended Release 24 Hour; TAKE ONE TABLET BY  MOUTH DAILY   · Aspirin 81 81 MG Oral Tablet Delayed Release; TAKE 1 TABLET DAILY   · Azelastine HCl - 137 MCG/SPRAY Nasal Solution; SPRAY 2 SPRAYS INTO EACH  NOSTRIL TWICE A DAY   · Claritin 10 MG Oral Capsule; TAKE 1 CAPSULE Daily   · Clopidogrel Bisulfate TABS; TAKE 1 TABLET DAILY AT BEDTIME   · Entresto 49-51 MG Oral Tablet; TAKE 1 TABLET BY MOUTH TWICE A DAY   · Ezetimibe 10 MG Oral Tablet; Take 1 tablet daily   · Furosemide TABS; TAKE 3 TABLET  no dialysis day   4 days per week  total of 60 mg   · Isosorbide Mononitrate ER 30 MG Oral Tablet Extended Release 24 Hour; TAKE 1  TABLET BY MOUTH EVERY DAY   · Lubiprostone 8 MCG Oral Capsule; 1 CAPSULE WITH FOOD AND WATER ORALLY  TWICE A DAY   · metOLazone 2.5 MG Oral Tablet; 1 TABLET ORALLY ONCE A DAY ON NON DIALYSIS  DAYS 90 DAYS   · Metoprolol Tartrate 25 MG Oral Tablet; TAKE 1 TABLET TWICE DAILY   · Nitrostat 0.4 MG Sublingual Tablet Sublingual   · Ozempic (0.25 or 0.5 MG/DOSE) SOPN   · raNITIdine HCl CAPS; TAKE 1 CAPSULE   4 days per week non dialysis days   · Rosuvastatin Calcium 20 MG Oral Tablet   · Vitamin D CAPS; TAKE 1 CAPSULE Daily DIALYBITE  800 MG    Allergies    Sulfa Drugs      Pre cath note:    indication:  [ ] STEMI                [ ] NSTEMI                 [ ] Acute coronary syndrome                         [ ]Unstable Angina   [ ] high risk  [ ] intermediate risk  [ ] low risk                         [ ] Stable Angina     non-invasive testing: + NST Lexiscan                         Date: 2023      result: [ ] high risk  [x ] intermediate risk  [ ] low risk    Anti- Anginal medications:                        [ ] not used                       [x ] used:  [ ]CCB  [x ] BB  [ x] Nitrate [ ] Ranexa                [ ] not used but strong indication not to use    Ejection Fraction                       [ ] <29            [x ] 30-39%   [ ] 40-49%     [ ]>50%    CHF                       [ ] active (within last 14 days on meds   [ x] Chronic (on meds but no exacerbation)    COPD                        [ ] mild (on chronic bronchodilators)  [ ] moderate (on chronic steroid therapy)      [ ] severe (indication for home O2 or PACO2 >50)    Other risk factors:                         [x ] Previous MI                       [ ] CVA/ stroke                      [ ] carotid stent/ CEA                      [ ] PVD/PAD- (arterial aneurysm, non-palpable pulses, tortuous vessel with inability to insert catheter, infra-renal dissection, renal or subclavian artery stenosis)                      [x ] diabetic                      [ ] previous CABG                      [x ] Renal Failure     Adjusted CathPCI Bleeding Event Risk:   %    RIGHT RADIAL ARTERY EVALUATION:  DYANA TEST: [ ] Negative          [ ] Positive    IVF: 250cc NS bolus pre-cath hydration [ ]          DAWSON FALLON is here for [facility], need renal transplant. + NST  PMHx: hypertension, hyperlipidemia, Anemia, CKD on HD since April 2022, old myocardial infarction, coronary artery disease, s/p PCI distal RCA 4/12/2023, cardiomyopathy, chronic systolic heart failure NYHA III, IVCD  ms. patient has left arm AV fistula. He has no chest pain, no shortness of breath at rest, mild to moderate dyspnea on exertion, no orthopnea, no PND. He denies dizziness, lightheadedness and syncope. He has mild to moderate exertional symptoms.       Stress Test: (04/21/2023): Nuclear scan. Lexiscan. Moderate sized moderate fixed defect in the inferior segment. Gated images showed hypokinesis, with severe hypokinesis in the inferior apical segment. LVEF 32%.    Echo: (08/21/2023): 2D echo. No LVH. Inferoapical hypokinesis. EF 25-30%. Left atrial enlargement.        Pre cath note:    indication:  [ ] STEMI                [ ] NSTEMI                 [ ] Acute coronary syndrome                         [ ]Unstable Angina   [ ] high risk  [ ] intermediate risk  [ ] low risk                         [ ] Stable Angina     non-invasive testing: + NST     Date: 04/21/2023      result: [ ] high risk  [x ] intermediate risk  [ ] low risk    Anti- Anginal medications:                        [ ] not used           [x ] used:  [ ]CCB  [x ] BB  [ x] Nitrate [ ] Ranexa                [ ] not used but strong indication not to use    Ejection Fraction                       [ ] <29            [x ] 30-39%   [ ] 40-49%     [ ]>50%    CHF                       [ ] active (within last 14 days on meds   [ x] Chronic (on meds but no exacerbation)    COPD                        [ ] mild (on chronic bronchodilators)  [ ] moderate (on chronic steroid therapy)      [ ] severe (indication for home O2 or PACO2 >50)    Other risk factors:                         [x ] Previous MI                       [ ] CVA/ stroke                      [ ] carotid stent/ CEA                      [ ] PVD/PAD- (arterial aneurysm, non-palpable pulses, tortuous vessel with inability to insert catheter, infra-renal dissection, renal or subclavian artery stenosis)                      [x ] diabetic                      [ ] previous CABG                      [x ] Renal Failure     Adjusted Cath PCI Bleeding Event Risk:   %    RIGHT RADIAL ARTERY EVALUATION:  DYANA TEST: [ ] Negative          [ ] Positive    IVF: 250cc NS bolus pre-cath hydration [ ]          DAWSON FALLON is here for [facility], need renal transplant. + NST  PMHx: hypertension, hyperlipidemia, Anemia, CKD on HD since April 2022, old myocardial infarction, coronary artery disease, s/p PCI distal RCA 4/12/2023, cardiomyopathy, chronic systolic heart failure NYHA III, IVCD  ms. patient has left arm AV fistula. He has no chest pain, no shortness of breath at rest, mild to moderate dyspnea on exertion, no orthopnea, no PND. He denies dizziness, lightheadedness and syncope. He has mild to moderate exertional symptoms.       Stress Test: (04/21/2023): Nuclear scan. Lexiscan. Moderate sized moderate fixed defect in the inferior segment. Gated images showed hypokinesis, with severe hypokinesis in the inferior apical segment. LVEF 32%.    Echo: (08/21/2023): 2D echo. No LVH. Inferoapical hypokinesis. EF 25-30%. Left atrial enlargement.        Pre cath note:    indication:  [ ] STEMI                [ ] NSTEMI                 [ ] Acute coronary syndrome                         [ ]Unstable Angina   [ ] high risk  [ ] intermediate risk  [ ] low risk                         [ ] Stable Angina     non-invasive testing: + NST     Date: 04/21/2023      result: [ ] high risk  [x ] intermediate risk  [ ] low risk    Anti- Anginal medications:                        [ ] not used           [x ] used:  [ ]CCB  [x ] BB  [ x] Nitrate [ ] Ranexa                [ ] not used but strong indication not to use    Ejection Fraction                       [ ] <29            [x ] 30-39%   [ ] 40-49%     [ ]>50%    CHF                       [ ] active (within last 14 days on meds   [ x] Chronic (on meds but no exacerbation)    COPD                        [ ] mild (on chronic bronchodilators)  [ ] moderate (on chronic steroid therapy)      [ ] severe (indication for home O2 or PACO2 >50)    Other risk factors:                         [x ] Previous MI                       [ ] CVA/ stroke                      [ ] carotid stent/ CEA                      [ ] PVD/PAD- (arterial aneurysm, non-palpable pulses, tortuous vessel with inability to insert catheter, infra-renal dissection, renal or subclavian artery stenosis)                      [x ] diabetic                      [ ] previous CABG                      [x ] Renal Failure     Adjusted Cath PCI Bleeding Event Risk:   %    RIGHT RADIAL ARTERY EVALUATION:  DYANA TEST: [ ] Negative          [ ] Positive    IVF: 250cc NS bolus pre-cath hydration [ ]          DAWSON FALLON is here for [facility], need renal transplant. + NST  PMHx: hypertension, hyperlipidemia, Anemia, CKD on HD since April 2022, old myocardial infarction, coronary artery disease, s/p PCI distal RCA 4/12/2023, cardiomyopathy, chronic systolic heart failure NYHA III, IVCD  ms. patient has left arm AV fistula. He has no chest pain, no shortness of breath at rest, mild to moderate dyspnea on exertion, no orthopnea, no PND. He denies dizziness, lightheadedness and syncope. He has mild to moderate exertional symptoms.     HD M/W/F    Stress Test: (04/21/2023): Nuclear scan. Lexiscan. Moderate sized moderate fixed defect in the inferior segment. Gated images showed hypokinesis, with severe hypokinesis in the inferior apical segment. LVEF 32%.    Echo: (08/21/2023): 2D echo. No LVH. Inferoapical hypokinesis. EF 25-30%. Left atrial enlargement.        Pre cath note:    indication:  [ ] STEMI                [ ] NSTEMI                 [ ] Acute coronary syndrome                         [ ]Unstable Angina   [ ] high risk  [ ] intermediate risk  [ ] low risk                         [ ] Stable Angina     non-invasive testing: + NST     Date: 04/21/2023      result: [ ] high risk  [x ] intermediate risk  [ ] low risk    Anti- Anginal medications:                        [ ] not used           [x ] used:  [ ]CCB  [x ] BB  [ x] Nitrate [ ] Ranexa                [ ] not used but strong indication not to use    Ejection Fraction                       [ ] <29            [x ] 30-39%   [ ] 40-49%     [ ]>50%    CHF                       [ ] active (within last 14 days on meds   [ x] Chronic (on meds but no exacerbation)    COPD                        [ ] mild (on chronic bronchodilators)  [ ] moderate (on chronic steroid therapy)      [ ] severe (indication for home O2 or PACO2 >50)    Other risk factors:                         [x ] Previous MI                       [ ] CVA/ stroke                      [ ] carotid stent/ CEA                      [ ] PVD/PAD- (arterial aneurysm, non-palpable pulses, tortuous vessel with inability to insert catheter, infra-renal dissection, renal or subclavian artery stenosis)                      [x ] diabetic                      [ ] previous CABG                      [x ] Renal Failure     Adjusted Cath PCI Bleeding Event Risk: 8.7%    RIGHT RADIAL ARTERY EVALUATION:  DYANA TEST: [ ] Negative          [ x] Positive    NO IVF: due to HD           DAWSON FALLON is here for [facility], need renal transplant. + NST  PMHx: hypertension, hyperlipidemia, Anemia, CKD on HD since April 2022, old myocardial infarction, coronary artery disease, s/p PCI distal RCA 4/12/2023, cardiomyopathy, chronic systolic heart failure NYHA III, IVCD  ms. patient has left arm AV fistula. He has no chest pain, no shortness of breath at rest, mild to moderate dyspnea on exertion, no orthopnea, no PND. He denies dizziness, lightheadedness and syncope. He has mild to moderate exertional symptoms.     HD M/W/F    Stress Test: (04/21/2023): Nuclear scan. Lexiscan. Moderate sized moderate fixed defect in the inferior segment. Gated images showed hypokinesis, with severe hypokinesis in the inferior apical segment. LVEF 32%.    Echo: (08/21/2023): 2D echo. No LVH. Inferoapical hypokinesis. EF 25-30%. Left atrial enlargement.        Pre cath note:    indication:  [ ] STEMI                [ ] NSTEMI                 [ ] Acute coronary syndrome                         [ ]Unstable Angina   [ ] high risk  [ ] intermediate risk  [ ] low risk                         [ ] Stable Angina     non-invasive testing: + NST     Date: 04/21/2023      result: [ ] high risk  [x ] intermediate risk  [ ] low risk    Anti- Anginal medications:                        [ ] not used           [x ] used:  [ ]CCB  [x ] BB  [ x] Nitrate [ ] Ranexa                [ ] not used but strong indication not to use    Ejection Fraction                       [ ] <29            [x ] 30-39%   [ ] 40-49%     [ ]>50%    CHF                       [ ] active (within last 14 days on meds   [ x] Chronic (on meds but no exacerbation)    COPD                        [ ] mild (on chronic bronchodilators)  [ ] moderate (on chronic steroid therapy)      [ ] severe (indication for home O2 or PACO2 >50)    Other risk factors:                         [x ] Previous MI                       [ ] CVA/ stroke                      [ ] carotid stent/ CEA                      [ ] PVD/PAD- (arterial aneurysm, non-palpable pulses, tortuous vessel with inability to insert catheter, infra-renal dissection, renal or subclavian artery stenosis)                      [x ] diabetic                      [ ] previous CABG                      [x ] Renal Failure     Adjusted Cath PCI Bleeding Event Risk: 8.7%    RIGHT RADIAL ARTERY EVALUATION:  DYANA TEST: [ ] Negative          [ x] Positive    NO IVF: due to HD

## 2024-01-05 ENCOUNTER — TRANSCRIPTION ENCOUNTER (OUTPATIENT)
Age: 74
End: 2024-01-05

## 2024-01-05 ENCOUNTER — OUTPATIENT (OUTPATIENT)
Dept: OUTPATIENT SERVICES | Facility: HOSPITAL | Age: 74
LOS: 1 days | Discharge: HOME | End: 2024-01-05
Payer: MEDICARE

## 2024-01-05 VITALS
DIASTOLIC BLOOD PRESSURE: 58 MMHG | OXYGEN SATURATION: 100 % | RESPIRATION RATE: 14 BRPM | HEART RATE: 68 BPM | SYSTOLIC BLOOD PRESSURE: 146 MMHG

## 2024-01-05 DIAGNOSIS — Z98.49 CATARACT EXTRACTION STATUS, UNSPECIFIED EYE: Chronic | ICD-10-CM

## 2024-01-05 DIAGNOSIS — Z98.890 OTHER SPECIFIED POSTPROCEDURAL STATES: Chronic | ICD-10-CM

## 2024-01-05 DIAGNOSIS — I77.0 ARTERIOVENOUS FISTULA, ACQUIRED: Chronic | ICD-10-CM

## 2024-01-05 DIAGNOSIS — I25.10 ATHEROSCLEROTIC HEART DISEASE OF NATIVE CORONARY ARTERY WITHOUT ANGINA PECTORIS: ICD-10-CM

## 2024-01-05 LAB
ANION GAP SERPL CALC-SCNC: 15 MMOL/L — HIGH (ref 7–14)
ANION GAP SERPL CALC-SCNC: 15 MMOL/L — HIGH (ref 7–14)
BUN SERPL-MCNC: 52 MG/DL — HIGH (ref 10–20)
BUN SERPL-MCNC: 52 MG/DL — HIGH (ref 10–20)
CALCIUM SERPL-MCNC: 9.5 MG/DL — SIGNIFICANT CHANGE UP (ref 8.4–10.4)
CALCIUM SERPL-MCNC: 9.5 MG/DL — SIGNIFICANT CHANGE UP (ref 8.4–10.4)
CHLORIDE SERPL-SCNC: 97 MMOL/L — LOW (ref 98–110)
CHLORIDE SERPL-SCNC: 97 MMOL/L — LOW (ref 98–110)
CO2 SERPL-SCNC: 24 MMOL/L — SIGNIFICANT CHANGE UP (ref 17–32)
CO2 SERPL-SCNC: 24 MMOL/L — SIGNIFICANT CHANGE UP (ref 17–32)
CREAT SERPL-MCNC: 4.3 MG/DL — CRITICAL HIGH (ref 0.7–1.5)
CREAT SERPL-MCNC: 4.3 MG/DL — CRITICAL HIGH (ref 0.7–1.5)
EGFR: 14 ML/MIN/1.73M2 — LOW
EGFR: 14 ML/MIN/1.73M2 — LOW
GLUCOSE BLDC GLUCOMTR-MCNC: 94 MG/DL — SIGNIFICANT CHANGE UP (ref 70–99)
GLUCOSE BLDC GLUCOMTR-MCNC: 94 MG/DL — SIGNIFICANT CHANGE UP (ref 70–99)
GLUCOSE SERPL-MCNC: 90 MG/DL — SIGNIFICANT CHANGE UP (ref 70–99)
GLUCOSE SERPL-MCNC: 90 MG/DL — SIGNIFICANT CHANGE UP (ref 70–99)
HCT VFR BLD CALC: 32.1 % — LOW (ref 42–52)
HCT VFR BLD CALC: 32.1 % — LOW (ref 42–52)
HGB BLD-MCNC: 9.8 G/DL — LOW (ref 14–18)
HGB BLD-MCNC: 9.8 G/DL — LOW (ref 14–18)
MCHC RBC-ENTMCNC: 30.2 PG — SIGNIFICANT CHANGE UP (ref 27–31)
MCHC RBC-ENTMCNC: 30.2 PG — SIGNIFICANT CHANGE UP (ref 27–31)
MCHC RBC-ENTMCNC: 30.5 G/DL — LOW (ref 32–37)
MCHC RBC-ENTMCNC: 30.5 G/DL — LOW (ref 32–37)
MCV RBC AUTO: 99.1 FL — HIGH (ref 80–94)
MCV RBC AUTO: 99.1 FL — HIGH (ref 80–94)
NRBC # BLD: 0 /100 WBCS — SIGNIFICANT CHANGE UP (ref 0–0)
NRBC # BLD: 0 /100 WBCS — SIGNIFICANT CHANGE UP (ref 0–0)
PLATELET # BLD AUTO: 118 K/UL — LOW (ref 130–400)
PLATELET # BLD AUTO: 118 K/UL — LOW (ref 130–400)
PMV BLD: 13.3 FL — HIGH (ref 7.4–10.4)
PMV BLD: 13.3 FL — HIGH (ref 7.4–10.4)
POTASSIUM SERPL-MCNC: 4.7 MMOL/L — SIGNIFICANT CHANGE UP (ref 3.5–5)
POTASSIUM SERPL-MCNC: 4.7 MMOL/L — SIGNIFICANT CHANGE UP (ref 3.5–5)
POTASSIUM SERPL-SCNC: 4.7 MMOL/L — SIGNIFICANT CHANGE UP (ref 3.5–5)
POTASSIUM SERPL-SCNC: 4.7 MMOL/L — SIGNIFICANT CHANGE UP (ref 3.5–5)
RBC # BLD: 3.24 M/UL — LOW (ref 4.7–6.1)
RBC # BLD: 3.24 M/UL — LOW (ref 4.7–6.1)
RBC # FLD: 16.4 % — HIGH (ref 11.5–14.5)
RBC # FLD: 16.4 % — HIGH (ref 11.5–14.5)
SODIUM SERPL-SCNC: 136 MMOL/L — SIGNIFICANT CHANGE UP (ref 135–146)
SODIUM SERPL-SCNC: 136 MMOL/L — SIGNIFICANT CHANGE UP (ref 135–146)
WBC # BLD: 5.23 K/UL — SIGNIFICANT CHANGE UP (ref 4.8–10.8)
WBC # BLD: 5.23 K/UL — SIGNIFICANT CHANGE UP (ref 4.8–10.8)
WBC # FLD AUTO: 5.23 K/UL — SIGNIFICANT CHANGE UP (ref 4.8–10.8)
WBC # FLD AUTO: 5.23 K/UL — SIGNIFICANT CHANGE UP (ref 4.8–10.8)

## 2024-01-05 PROCEDURE — 82962 GLUCOSE BLOOD TEST: CPT

## 2024-01-05 PROCEDURE — 36415 COLL VENOUS BLD VENIPUNCTURE: CPT

## 2024-01-05 PROCEDURE — 93458 L HRT ARTERY/VENTRICLE ANGIO: CPT

## 2024-01-05 PROCEDURE — 80048 BASIC METABOLIC PNL TOTAL CA: CPT

## 2024-01-05 PROCEDURE — 85027 COMPLETE CBC AUTOMATED: CPT

## 2024-01-05 PROCEDURE — C1887: CPT

## 2024-01-05 PROCEDURE — C1769: CPT

## 2024-01-05 PROCEDURE — C1894: CPT

## 2024-01-05 RX ORDER — CHLORHEXIDINE GLUCONATE 213 G/1000ML
1 SOLUTION TOPICAL ONCE
Refills: 0 | Status: DISCONTINUED | OUTPATIENT
Start: 2024-01-05 | End: 2024-01-05

## 2024-01-05 RX ORDER — DESLORATADINE AND PSEUDOEPHEDRINE SULFATE 2.5; 12 MG/1; MG/1
1 TABLET, EXTENDED RELEASE ORAL
Refills: 0 | DISCHARGE

## 2024-01-05 RX ORDER — DIPHENHYDRAMINE HCL 50 MG
1 CAPSULE ORAL
Refills: 0 | DISCHARGE

## 2024-01-05 RX ORDER — ROSUVASTATIN CALCIUM 5 MG/1
1 TABLET ORAL
Refills: 0 | DISCHARGE

## 2024-01-05 NOTE — ASU DISCHARGE PLAN (ADULT/PEDIATRIC) - CARE PROVIDER_API CALL
Vidal Calvillo  Interventional Cardiology  [address] [place] [address]  Phone: [phone]  Fax: [phone]  Follow Up Time: 2 weeks   Vidal Calvillo  Interventional Cardiology  [address] [place] [address]  Phone: [phone]  Fax: [phone]  Follow Up Time: 2 weeks

## 2024-01-05 NOTE — ASU PATIENT PROFILE, ADULT - PAIN SCALE PREFERRED, PROFILE
Electrodesiccation And Curettage Text: The wound bed was treated with electrodesiccation and curettage after the biopsy was performed. numerical [phone]

## 2024-01-05 NOTE — CHART NOTE - NSCHARTNOTEFT_GEN_A_CORE
PRE-OP DIAGNOSIS:  drop in EF    PROCEDURE:   [x ] Coronary Angiogram   [x ] LHC   [ ] LVG   [ ] RHC   [] Intervention (see below)       PHYSICIAN:  Dr. aClvillo  FELLOW: Juan    PROCEDURE DESCRIPTION:     Consent:    [x] Patient   [] Family Member   []  Used      Anesthesia:   [ ] General   [X] Sedation   [X] Local     Access & Closure:   [x ] 5  Fr R  Femoral Artery -> Manual compression     IV Contrast: 60 mL      Intervention: none    Implants: none     FINDINGS:   Coronary Dominance: right  LM: mild disease  LAD: 50% mLAD stenosis. Mild disease in remainder of vessel  CX: mild disease  RCA: 50% ISR on mRCA stent. Patent dRCA stent. 80% RV marginal stenosis    LVEDP:  not assessed    EF:  32% on TTE     ESTIMATED BLOOD LOSS: < 10 mL      CONDITION:   [x] Good   [ ] Fair   [ ] Critical     SPECIMEN REMOVED: N/A     POST-OP DIAGNOSIS:    [x ] Mild Coronary Artery Disease (< 50% stenosis)      PLAN OF CARE:   [ x] D/C Home Today   [ ] Return to In-patient bed   [ ] Admit for observation   [ ] Return for Staged Procedure in 4-6 weeks   [ ] CT Surgery Consult   [X] Medications: ASA,  statin, entresto, metoprolol  [] IV Fluids: 74cc/h until d/c  [x] Remove femoral sheath and Hold manual pressure if signs of hematoma or bleeding over femoral access site.  [x] Smoking cessation PRE-OP DIAGNOSIS:  drop in EF    PROCEDURE:   [x ] Coronary Angiogram   [x ] LHC   [ ] LVG   [ ] RHC   [] Intervention (see below)       PHYSICIAN:  Dr. Calvillo  FELLOW: Juan    PROCEDURE DESCRIPTION:     Consent:    [x] Patient   [] Family Member   []  Used      Anesthesia:   [ ] General   [X] Sedation   [X] Local     Access & Closure:   [x ] 5  Fr R  Femoral Artery -> Manual compression     IV Contrast: 60 mL      Intervention: none    Implants: none     FINDINGS:   Coronary Dominance: right  LM: mild disease  LAD: 50% mLAD stenosis. Mild disease in remainder of vessel  CX: mild disease  RCA: 50% ISR on mRCA stent. Patent dRCA stent. 80% RV marginal stenosis    LVEDP:  not assessed    EF:  32% on TTE     ESTIMATED BLOOD LOSS: < 10 mL      CONDITION:   [x] Good   [ ] Fair   [ ] Critical     SPECIMEN REMOVED: N/A     POST-OP DIAGNOSIS:    [x ] Mild Coronary Artery Disease (< 50% stenosis)      PLAN OF CARE:   [ x] D/C Home Today   [ ] Return to In-patient bed   [ ] Admit for observation   [ ] Return for Staged Procedure in 4-6 weeks   [ ] CT Surgery Consult   [X] Medications: ASA,  statin, entresto, metoprolol  [] IV Fluids: 74cc/h until d/c  [x] Remove femoral sheath and Hold manual pressure if signs of hematoma or bleeding over femoral access site.  [x] Smoking cessation PRE-OP DIAGNOSIS:  drop in EF    PROCEDURE:   [x ] Coronary Angiogram   [x ] LHC   [ ] LVG   [ ] RHC   [] Intervention (see below)       PHYSICIAN:  Dr. Calvillo  FELLOW: Juan    PROCEDURE DESCRIPTION:     Consent:    [x] Patient   [] Family Member   []  Used      Anesthesia:   [ ] General   [X] Sedation   [X] Local     Access & Closure:   [x ] 5  Fr R  Femoral Artery -> Manual compression     IV Contrast: 60 mL      Intervention: none    Implants: none     FINDINGS:   Coronary Dominance: right  LM: mild disease  LAD: 50% mLAD stenosis. Mild disease in remainder of vessel  CX: mild disease  RCA: 50% ISR on mRCA stent. Patent dRCA stent. 80% RV marginal stenosis    LVEDP:  not assessed    EF:  32% on TTE     ESTIMATED BLOOD LOSS: < 10 mL      CONDITION:   [x] Good   [ ] Fair   [ ] Critical     SPECIMEN REMOVED: N/A     POST-OP DIAGNOSIS:    [x ] Mild Coronary Artery Disease (< 50% stenosis)      PLAN OF CARE:   [ x] D/C Home Today   [ ] Return to In-patient bed   [ ] Admit for observation   [ ] Return for Staged Procedure in 4-6 weeks   [ ] CT Surgery Consult   [X] Medications: ASA,  statin, entresto, metoprolol  [] IV Fluids: 74cc/h until d/c  [x] Remove femoral sheath and Hold manual pressure if signs of hematoma or bleeding over femoral access site.  [x] Smoking cessation  [x] Can proceed with renal transplant as planned.

## 2024-01-05 NOTE — ASU PATIENT PROFILE, ADULT - FALL HARM RISK - HARM RISK INTERVENTIONS
Communicate Risk of Fall with Harm to all staff/Monitor gait and stability/Reinforce activity limits and safety measures with patient and family/Tailored Fall Risk Interventions/Visual Cue: Yellow wristband and red socks/Bed in lowest position, wheels locked, appropriate side rails in place/Call bell, personal items and telephone in reach/Instruct patient to call for assistance before getting out of bed or chair/Non-slip footwear when patient is out of bed/[place] to call system/Physically safe environment - no spills, clutter or unnecessary equipment/Purposeful Proactive Rounding/Room/bathroom lighting operational, light cord in reach Communicate Risk of Fall with Harm to all staff/Monitor gait and stability/Reinforce activity limits and safety measures with patient and family/Tailored Fall Risk Interventions/Visual Cue: Yellow wristband and red socks/Bed in lowest position, wheels locked, appropriate side rails in place/Call bell, personal items and telephone in reach/Instruct patient to call for assistance before getting out of bed or chair/Non-slip footwear when patient is out of bed/[place] to call system/Physically safe environment - no spills, clutter or unnecessary equipment/Purposeful Proactive Rounding/Room/bathroom lighting operational, light cord in reach

## 2024-01-05 NOTE — ASU DISCHARGE PLAN (ADULT/PEDIATRIC) - PROVIDER TOKENS
PROVIDER:[TOKEN:[[id number]],FOLLOWUP:[2 weeks]] PROVIDER:[TOKEN:[[id number]],FOLLOWUP:[2 weeks]]

## 2024-01-05 NOTE — ASU DISCHARGE PLAN (ADULT/PEDIATRIC) - NS MD DC FALL RISK RISK
For information on Fall & Injury Prevention, visit: [email or web address] OR  [email or web address] OR  https://www.cdc.gov/steadi/patient.html For information on Fall & Injury Prevention, visit: [email or web address] OR  [email or web address]-injury OR  https://www.cdc.gov/steadi/patient.html

## 2024-01-05 NOTE — ASU DISCHARGE PLAN (ADULT/PEDIATRIC) - ASU DC SPECIAL INSTRUCTIONSFT
Activity:  - Do not drive or operate heavy machinery for 24 hours.  - Limit your physical or any strenuous activity for 2 weeks after angioplasty and 48 hours for angiogram. Support the groin site with your hand when you sneeze or cough. No heavy lifting ( objects more then 10 pounds).  - For wrist access, avoid using affected arm for 24 hours after removal of dressing and avoid heavy lifting for 7 days.  Hygiene:  - After 24 hours, you may shower and remove the dressing from the site. Do not tub bathe for one week. Do not rub or apply lotion, cream, powder to the affected site. Leave it open to air.   Diet:   - You may resume your diet. Low Sodium. Low Fat, Low Cholesterol.  If Diabetic - Carbohydrate Consistent Diet.      - Drink extra fluid unless otherwise advised.   Special Instructions:  - Bruising or black and blue at the puncture site is possible.  - If there is bleeding from the puncture site (groin or wrist) apply direct firm pressure on the site and call 911.  - Any sudden swelling, redness, fever, discharge or severe pain, call your physician or call the cath lab.   - If you notice any scab formation in the area avoid touching the site and allow it to heal.  - Numbness or "pins and needle" sensation in the affected arm, hand, leg or if the affected site become cool to touch or pale that persist for extended period of time call your physician immediately to be checked.  - If you developed chest pain, not relieved by your usual routine medication, fainting, lethargy, weakness, report to the nearest emergency room.   - Inform your Dentist or Surgeon if you are taking Aspirin or any antiplatelet medications. Report any bleeding in your urine or stool.   Medications:  - Soreness or tenderness at the site is possible it will diminish over time. You may take Tylenol every 4-6 hours as needed. Nothing stronger is needed.  - If you are diabetic and taking medication containing Metformin, do not take them for 48 hours after the procedure.     Any questions call Cardiac Cath Lab at [phone] or [phone], Monday - Friday from 7 - 9 pm. Activity:  - Do not drive or operate heavy machinery for 24 hours.  - Limit your physical or any strenuous activity for 2 weeks after angioplasty and 48 hours for angiogram. Support the groin site with your hand when you sneeze or cough. No heavy lifting ( objects more then 10 pounds).  - For wrist access, avoid using affected arm for 24 hours after removal of dressing and avoid heavy lifting for 7 days.  Hygiene:  - After 24 hours, you may shower and remove the dressing from the site. Do not tub bathe for one week. Do not rub or apply lotion, cream, powder to the affected site. Leave it open to air.   Diet:   - You may resume your diet. Low Sodium. Low Fat, Low Cholesterol.  If Diabetic - Carbohydrate Consistent Diet.      - Drink extra fluid unless otherwise advised.   Special Instructions:  - Bruising or black and blue at the puncture site is possible.  - If there is bleeding from the puncture site (groin or wrist) apply direct firm pressure on the site and call 911.  - Any sudden swelling, redness, fever, discharge or severe pain, call your physician or call the cath lab.   - If you notice any scab formation in the area avoid touching the site and allow it to heal.  - Numbness or "pins and needle" sensation in the affected arm, hand, leg or if the affected site become cool to touch or pale that persist for extended period of time call your physician immediately to be checked.  - If you developed chest pain, not relieved by your usual routine medication, fainting, lethargy, weakness, report to the nearest emergency room.   - Inform your Dentist or Surgeon if you are taking Aspirin or any antiplatelet medications. Report any bleeding in your urine or stool.   Medications:  - Soreness or tenderness at the site is possible it will diminish over time. You may take Tylenol every 4-6 hours as needed. Nothing stronger is needed.  - If you are diabetic and taking medication containing Metformin, do not take them for 48 hours after the procedure.     Any questions call Cardiac Cath Lab at [phone] or [phone], Monday - Friday from 7 - 9 pm.

## 2024-01-11 DIAGNOSIS — I25.10 ATHEROSCLEROTIC HEART DISEASE OF NATIVE CORONARY ARTERY WITHOUT ANGINA PECTORIS: ICD-10-CM

## 2024-02-23 ENCOUNTER — NON-APPOINTMENT (OUTPATIENT)
Age: 74
End: 2024-02-23

## 2024-02-23 ENCOUNTER — APPOINTMENT (OUTPATIENT)
Dept: CARDIOLOGY | Facility: CLINIC | Age: 74
End: 2024-02-23
Payer: MEDICARE

## 2024-02-23 PROCEDURE — 93295 DEV INTERROG REMOTE 1/2/MLT: CPT

## 2024-02-23 PROCEDURE — 93296 REM INTERROG EVL PM/IDS: CPT

## 2024-03-07 ENCOUNTER — APPOINTMENT (OUTPATIENT)
Dept: ELECTROPHYSIOLOGY | Facility: CLINIC | Age: 74
End: 2024-03-07
Payer: MEDICARE

## 2024-03-07 VITALS
WEIGHT: 190.6 LBS | RESPIRATION RATE: 17 BRPM | HEIGHT: 71 IN | SYSTOLIC BLOOD PRESSURE: 103 MMHG | TEMPERATURE: 97.4 F | BODY MASS INDEX: 26.68 KG/M2 | HEART RATE: 77 BPM | DIASTOLIC BLOOD PRESSURE: 60 MMHG

## 2024-03-07 PROCEDURE — 99214 OFFICE O/P EST MOD 30 MIN: CPT

## 2024-03-07 PROCEDURE — 93284 PRGRMG EVAL IMPLANTABLE DFB: CPT

## 2024-03-07 NOTE — PHYSICAL EXAM
[General Appearance - In No Acute Distress] : no acute distress [Heart Rate And Rhythm] : heart rate and rhythm were normal [Heart Sounds] : normal S1 and S2 [] : no respiratory distress [Respiration, Rhythm And Depth] : normal respiratory rhythm and effort [Exaggerated Use Of Accessory Muscles For Inspiration] : no accessory muscle use [Clean] : clean [Dry] : dry [Well-Healed] : well-healed [Right Infraclavicular] : right infraclavicular area

## 2024-03-08 NOTE — CARDIOLOGY SUMMARY
[de-identified] : (09/11/2023): Sinus rhythm at 73 bpm, IVCD with QRS of 132 msec, Q wave in inferior leads, non-specific T wave abnormalities.   [de-identified] : (08/21/2023): 2D echo. No LVH. Inferoapical hypokinesis. EF 25-30%. Left atrial enlargement.  (04/07/2022): 2D echo. LVEF 45-50%. Mildly decreased LV global systolic function. Global cardiomyopathy. Moderately to severely increased LV cavity size. Mild LAE. Mild PHI. Moderate LAE. Moderate MR.    [de-identified] : (04/21/2023): Nuclear scan. Lexiscan. Moderate sized moderate fixed defect in the inferior segment. Gated images showed hypokinesis, with severe hypokinesis in the inferior apical segment. LVEF 32%.  [de-identified] : (06/26/2023): Cardiac MRI without gadolinium. LV dilated. No LVH. LVEF 10%. RVEF 24%. Large left and moderate right pleural effusion. Gabriels dyskinetic. Study not designed to detect LGE.   [de-identified] : [employer] CRT-D 10/19/2023 [de-identified] : (04/14/2022): MUGA scan. LVEF 42%, dilated LV with mild hypokinesis.  [de-identified] : (04/12/2022): Coronary angiogram, single vessel disease, 99% distal RCA. Underwent successful stent with balloon angioplasty to 99% distal RCA.

## 2024-03-08 NOTE — HISTORY OF PRESENT ILLNESS
[FreeTextEntry1] : Cardio: Dr. Calvillo  hypertension, hyperlipidemia, Anemia, CKD on HD since April 2022, old myocardial infarction, coronary artery disease, s/p PCI distal RCA 4/12/2023, cardiomyopathy, chronic systolic heart failure NYHA III, IVCD  ms. patient has left arm AV fistula. He had prior right IJ dialysis catheter. He is on GDMT.  The patient presents today for routine device interrogation.  He reports lightheadedness after the dialysis at times due to low BP, but denies syncope, chest pain/discomfort, dyspnea and palpitations.  The patient states that he had a catheterization in February of this year that revealed no ischemia. No reports available.

## 2024-03-08 NOTE — DISCUSSION/SUMMARY
[FreeTextEntry1] : Mr. Luca Jenkins is a pleasant 73-year-old man with hypertension, hyperlipidemia, Anemia, CKD on HD since April 2022, old myocardial infarction, coronary artery disease, s/p PCI distal RCA 4/12/2023, cardiomyopathy, chronic systolic heart failure NYHA III, IVCD  ms. patient has left arm AV fistula. He had prior right IJ dialysis catheter. He is on GDMT. EF was 32% on 4/21/2023 on nuclear test, EF 10% on CMR on 6/25/2023, and EF 25=30% on echo on 8/21/2023. He is being followed by transplant team at [facility] in [place], [place].  I recommend to continue GDMT.  He underwent implant of CRT-D on 10/19/2023 and presents today for routine device interrogation.  -CRT-D interrogation Normal device function. AP: 3.3% // : 96.5% // Effective: 96.5% No episodes. Optivol below the threshold. The patient is enrolled on remote monitoring.  -Follow-up in 6 months or prn.   I have also advised the patient to go to the nearest emergency room if she experiences any chest pain, dyspnea, syncope, or has any other compelling symptoms.   
none

## 2024-03-08 NOTE — PROCEDURE
[No] : not [NSR] : normal sinus rhythm [CRT-D] : Cardiac resynchronization therapy defibrillator [DDD] : DDD [Voltage: ___ volts] : Voltage was [unfilled] volts [Longevity: ___ months] : The estimated remaining battery life is [unfilled] months [Sensing Amplitude ___mv] : sensing amplitude was [unfilled] mv [Lead Imp:  ___ohms] : lead impedance was [unfilled] ohms [___V @] : [unfilled] V [___ ms] : [unfilled] ms [See Device Printout] : See device printout [Threshold Testing Performed] : Threshold testing was performed [Programmed for Longevity] : output reprogrammed for improved battery longevity [Counters Reset] : the counters were reset [de-identified] : EYAL [de-identified] : [id number] [de-identified] : [id number] [de-identified] :  [de-identified] : 10/19/2023 [de-identified] : Normal device function. AP: 3.3% // : 96.5% // Effective: 96.5% No episodes. Optivol below the threshold. The patient is enrolled on remote monitoring. [de-identified] : 11.2 years

## 2024-03-21 ENCOUNTER — APPOINTMENT (OUTPATIENT)
Dept: UROLOGY | Facility: CLINIC | Age: 74
End: 2024-03-21

## 2024-04-11 ENCOUNTER — APPOINTMENT (OUTPATIENT)
Dept: UROLOGY | Facility: CLINIC | Age: 74
End: 2024-04-11
Payer: MEDICARE

## 2024-04-11 VITALS
HEART RATE: 75 BPM | HEIGHT: 71 IN | WEIGHT: 191.8 LBS | SYSTOLIC BLOOD PRESSURE: 128 MMHG | BODY MASS INDEX: 26.85 KG/M2 | RESPIRATION RATE: 18 BRPM | DIASTOLIC BLOOD PRESSURE: 70 MMHG | OXYGEN SATURATION: 97 %

## 2024-04-11 DIAGNOSIS — N13.8 BENIGN PROSTATIC HYPERPLASIA WITH LOWER URINARY TRACT SYMPMS: ICD-10-CM

## 2024-04-11 DIAGNOSIS — N40.1 BENIGN PROSTATIC HYPERPLASIA WITH LOWER URINARY TRACT SYMPMS: ICD-10-CM

## 2024-04-11 PROCEDURE — 99214 OFFICE O/P EST MOD 30 MIN: CPT

## 2024-04-13 PROBLEM — N40.1 BENIGN LOCALIZED HYPERPLASIA OF PROSTATE WITH URINARY OBSTRUCTION: Status: ACTIVE | Noted: 2021-08-04

## 2024-04-13 NOTE — HISTORY OF PRESENT ILLNESS
[FreeTextEntry1] : 73 year old initially seen in 2021 (prior to renal failure diagnosis) at the time his bothersome LUTS was addressed with Alfuzosin.  progressed to ESRD on hemodialysis.  Patient reports that he continues to make urine and is urinating well.   He denies any dysuria and gross hematuria.   Bladder scan PVR last viist 7 mL.  PSA  March 2024   0.61 ng/ml  March 2023  0.59 ng/ml July 2021      0.3 ng/ml  Urinary Bladder US 8/2021 Prevoid 288 post void 43 prostate volume 25 cc  No new urologic complaints today.   [Urinary Retention] : no urinary retention [Urinary Urgency] : no urinary urgency [Urinary Frequency] : no urinary frequency [Nocturia] : no nocturia [Straining] : no straining [Weak Stream] : no weak stream [Dysuria] : no dysuria [Hematuria - Gross] : no gross hematuria [Hematuria - Microscopic] : no microscopic hematuria [Bladder Spasm] : no bladder spasm

## 2024-04-13 NOTE — ASSESSMENT
[FreeTextEntry1] : 73 year old initially seen in 2021 (prior to renal failure diagnosis) at the time his bothersome LUTS was addressed with Alfuzosin.  progressed to ESRD on hemodialysis.  Patient reports that he continues to make urine and is urinating well.   He denies any dysuria and gross hematuria.   Bladder scan PVR last viist 7 mL.  PSA  March 2024   0.61 ng/ml  March 2023  0.59 ng/ml July 2021      0.3 ng/ml  Urinary Bladder US 8/2021 Prevoid 288 post void 43 prostate volume 25 cc  Plan  73-year-old with urinary symptoms and bladder emptying improved on alfuzosin 10 mg ER daily. End-stage renal disease on hemodialysis. PSA from March 2024 reviewed.   no indication for continued  follow up f/u as needed

## 2024-04-13 NOTE — LETTER BODY
[Dear  ___] : Dear  [unfilled], [Consult Letter:] : I had the pleasure of evaluating your patient, [unfilled]. [Please see my note below.] : Please see my note below. [Sincerely,] : Sincerely, [FreeTextEntry3] : Didi Singer MD, FACS

## 2024-04-13 NOTE — PHYSICAL EXAM
[General Appearance - In No Acute Distress] : no acute distress [Abdomen Soft] : soft [Abdomen Tenderness] : non-tender [] : no respiratory distress [Oriented To Time, Place, And Person] : oriented to person, place, and time [FreeTextEntry1] : ambulating with cane.

## 2024-06-01 ENCOUNTER — EMERGENCY (EMERGENCY)
Facility: HOSPITAL | Age: 74
LOS: 0 days | Discharge: HOME | End: 2024-06-01
Attending: EMERGENCY MEDICINE
Payer: MEDICARE

## 2024-06-01 VITALS
HEIGHT: 70 IN | HEART RATE: 81 BPM | TEMPERATURE: 98 F | WEIGHT: 190.04 LBS | RESPIRATION RATE: 18 BRPM | DIASTOLIC BLOOD PRESSURE: 85 MMHG | SYSTOLIC BLOOD PRESSURE: 152 MMHG | OXYGEN SATURATION: 99 %

## 2024-06-01 VITALS — HEART RATE: 77 BPM | SYSTOLIC BLOOD PRESSURE: 143 MMHG | TEMPERATURE: 98 F | DIASTOLIC BLOOD PRESSURE: 80 MMHG

## 2024-06-01 DIAGNOSIS — Z98.890 OTHER SPECIFIED POSTPROCEDURAL STATES: Chronic | ICD-10-CM

## 2024-06-01 DIAGNOSIS — S40.812A ABRASION OF LEFT UPPER ARM, INITIAL ENCOUNTER: ICD-10-CM
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
## 2024-06-01 DIAGNOSIS — Z79.02 LONG TERM (CURRENT) USE OF ANTITHROMBOTICS/ANTIPLATELETS: ICD-10-CM
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
## 2024-06-01 DIAGNOSIS — S60.511A ABRASION OF RIGHT HAND, INITIAL ENCOUNTER: ICD-10-CM

## 2024-06-01 DIAGNOSIS — S09.90XA UNSPECIFIED INJURY OF HEAD, INITIAL ENCOUNTER: ICD-10-CM

## 2024-06-01 DIAGNOSIS — Z88.2 ALLERGY STATUS TO SULFONAMIDES: ICD-10-CM

## 2024-06-01 DIAGNOSIS — S40.811A ABRASION OF RIGHT UPPER ARM, INITIAL ENCOUNTER: ICD-10-CM

## 2024-06-01 DIAGNOSIS — I77.0 ARTERIOVENOUS FISTULA, ACQUIRED: Chronic | ICD-10-CM

## 2024-06-01 DIAGNOSIS — S80.812A ABRASION, LEFT LOWER LEG, INITIAL ENCOUNTER: ICD-10-CM

## 2024-06-01 DIAGNOSIS — S80.211A ABRASION, RIGHT KNEE, INITIAL ENCOUNTER: ICD-10-CM

## 2024-06-01 DIAGNOSIS — S80.212A ABRASION, LEFT KNEE, INITIAL ENCOUNTER: ICD-10-CM

## 2024-06-01 DIAGNOSIS — S00.81XA ABRASION OF OTHER PART OF HEAD, INITIAL ENCOUNTER: ICD-10-CM

## 2024-06-01 DIAGNOSIS — N18.6 END STAGE RENAL DISEASE: ICD-10-CM

## 2024-06-01 DIAGNOSIS — Z98.49 CATARACT EXTRACTION STATUS, UNSPECIFIED EYE: Chronic | ICD-10-CM

## 2024-06-01 DIAGNOSIS — I25.10 ATHEROSCLEROTIC HEART DISEASE OF NATIVE CORONARY ARTERY WITHOUT ANGINA PECTORIS: ICD-10-CM

## 2024-06-01 DIAGNOSIS — Z99.2 DEPENDENCE ON RENAL DIALYSIS: ICD-10-CM

## 2024-06-01 DIAGNOSIS — S00.31XA ABRASION OF NOSE, INITIAL ENCOUNTER: ICD-10-CM

## 2024-06-01 DIAGNOSIS — S80.12XA CONTUSION OF LEFT LOWER LEG, INITIAL ENCOUNTER: ICD-10-CM

## 2024-06-01 PROCEDURE — 99284 EMERGENCY DEPT VISIT MOD MDM: CPT | Mod: FS

## 2024-06-01 PROCEDURE — 70450 CT HEAD/BRAIN W/O DYE: CPT | Mod: 26,MC

## 2024-06-01 PROCEDURE — 99284 EMERGENCY DEPT VISIT MOD MDM: CPT | Mod: 25

## 2024-06-01 PROCEDURE — 70450 CT HEAD/BRAIN W/O DYE: CPT | Mod: MC

## 2024-06-01 NOTE — ED ADULT NURSE NOTE - NSICDXPASTMEDICALHX_GEN_ALL_CORE_FT
PAST MEDICAL HISTORY:  Anemia     Cardiac defibrillator in place     DM (diabetes mellitus)     HTN (hypertension)     Kidney failure     Myocardial infarction     Pneumonia, pneumococcal

## 2024-06-01 NOTE — ED PROVIDER NOTE - CARE PLAN
1 Principal Discharge DX:	Abrasions of multiple sites   Principal Discharge DX:	Abrasions of multiple sites  Secondary Diagnosis:	Closed head injury  Secondary Diagnosis:	Fall

## 2024-06-01 NOTE — ED ADULT NURSE NOTE - NS ED NURSE RECORD ANOTHER VITAL SIGN
Yes Medical Necessity Clause: This procedure was medically necessary because the lesions that were treated were: irritated Detail Level: Zone Render Note In Bullet Format When Appropriate: No Include Z78.9 (Other Specified Conditions Influencing Health Status) As An Associated Diagnosis?: Yes Post-Care Instructions: I reviewed with the patient in detail post-care instructions. Patient is to wear sunprotection, and avoid picking at any of the treated lesions. Pt may apply Vaseline to crusted or scabbing areas. Consent: The patient's consent was obtained including but not limited to risks of crusting, scabbing, blistering, scarring, darker or lighter pigmentary change, recurrence, incomplete removal and infection. Duration Of Freeze Thaw-Cycle (Seconds): 10 Medical Necessity Information: It is in your best interest to select a reason for this procedure from the list below. All of these items fulfill various CMS LCD requirements except the new and changing color options. Number Of Freeze-Thaw Cycles: 2 freeze-thaw cycles

## 2024-06-01 NOTE — ED PROVIDER NOTE - ATTENDING APP SHARED VISIT CONTRIBUTION OF CARE
74-year-old male past medical history of ESRD on HD Monday, Wednesday, Friday, coronary artery disease on Plavix presents for evaluation s/p fall.  Patient states that he was doing something in his driveway when something got away from him and he went to run after it and he fell down the driveway.  Patient hit head, no LOC, no nausea vomiting, no neck pain, patient sustained multiple abrasions, states that tetanus is up-to-date, on exam patient in NAD, AAOx3, GCS 15, positive abrasion to bridge of nose, under eye, left side of forehead, bilateral knees, right knuckles, PERRL, EOMI, positive bruising to left lower leg, no tenderness, no crepitus, no midline vertebral tenderness, positive swelling to left medial knee, good range of motion, hips nontender

## 2024-06-01 NOTE — ED PROVIDER NOTE - PHYSICAL EXAMINATION
VITAL SIGNS: I have reviewed nursing notes and confirm.  CONSTITUTIONAL: Well-developed; well-nourished  SKIN: Multiple abrasions to face nasal bridge, arms/legs, no deep laceration  HEAD: Normocephalic; atraumatic.  EYES:  conjunctiva and sclera clear.  ENT: No nasal discharge; airway clear.  CARD: S1, S2 normal; no murmurs, gallops, or rubs. Regular rate and rhythm.   RESP: No wheezes, rales or rhonchi.  ABD: Normal bowel sounds; soft; non-distended; non-tender  EXT: No midline spinal tenderness, no C spine tenderness Normal ROM.  No clubbing, cyanosis or edema.   NEURO: Alert, oriented, grossly unremarkable

## 2024-06-01 NOTE — ED PROVIDER NOTE - OBJECTIVE STATEMENT
Patient is a 74-year-old male with history of end-stage renal disease on hemodialysis Monday Wednesday Friday, CAD on Plavix here for evaluation of abrasions to face bilateral arms and legs after mechanical trip and fall down driveway.  Patient denies LOC, anticoagulation use, neck pain, chest pain, abdominal pain.

## 2024-06-01 NOTE — ED PROVIDER NOTE - PATIENT PORTAL LINK FT
You can access the FollowMyHealth Patient Portal offered by [facility] by registering at the following website: [email or web address]. By joining [facility]’s FollowMyHealth portal, you will also be able to view your health information using other applications (apps) compatible with our system.

## 2024-06-01 NOTE — ED ADULT TRIAGE NOTE - ACCOMPANIED BY
[facility] Emergency Department  [address] [address]  Phone:  [phone]  Fax:  [phone]                                    Nasim Montez   MRN: [id number]    Department:  [facility] Emergency Department   Date of Visit:  9/28/2020           After Visit Summary Signature Page    I have received my discharge instructions, and my questions have been answered. I have discussed any challenges I see with this plan with the nurse or doctor.    ..........................................................................................................................................  Patient/Patient Representative Signature      ..........................................................................................................................................  Patient Representative Print Name and Relationship to Patient    ..................................................               ................................................  Date                                   Time    ..........................................................................................................................................  Reviewed by Signature/Title    ...................................................              ..............................................  Date                                               Time          22EPIC Rev 08/18        Immediate family member

## 2024-06-01 NOTE — ED PROVIDER NOTE - CLINICAL SUMMARY MEDICAL DECISION MAKING FREE TEXT BOX
Wounds were cleansed and dressed.  CT scan of the head was obtained.  There is no acute findings.  Results were discussed with patient.  Patient was given ice packs.  Patient to continue ice at home.  Patient to return for any worsening symptoms or concerns.  Patient to follow-up with PMD.

## 2024-06-10 ENCOUNTER — APPOINTMENT (OUTPATIENT)
Dept: ELECTROPHYSIOLOGY | Facility: CLINIC | Age: 74
End: 2024-06-10
Payer: MEDICARE

## 2024-06-10 VITALS
HEART RATE: 82 BPM | WEIGHT: 190 LBS | SYSTOLIC BLOOD PRESSURE: 110 MMHG | HEIGHT: 71 IN | BODY MASS INDEX: 26.6 KG/M2 | TEMPERATURE: 97.1 F | RESPIRATION RATE: 18 BRPM | DIASTOLIC BLOOD PRESSURE: 60 MMHG

## 2024-06-10 DIAGNOSIS — I42.0 DILATED CARDIOMYOPATHY: ICD-10-CM

## 2024-06-10 DIAGNOSIS — I50.22 CHRONIC SYSTOLIC (CONGESTIVE) HEART FAILURE: ICD-10-CM

## 2024-06-10 DIAGNOSIS — Z45.02 ENCOUNTER FOR ADJUSTMENT AND MANAGEMENT OF AUTOMATIC IMPLANTABLE CARDIAC DEFIBRILLATOR: ICD-10-CM

## 2024-06-10 PROCEDURE — 99215 OFFICE O/P EST HI 40 MIN: CPT

## 2024-06-10 PROCEDURE — 93000 ELECTROCARDIOGRAM COMPLETE: CPT | Mod: 59

## 2024-06-10 PROCEDURE — 93284 PRGRMG EVAL IMPLANTABLE DFB: CPT

## 2024-06-10 RX ORDER — AZELASTINE HYDROCHLORIDE 137 UG/1
137 SPRAY, METERED NASAL
Qty: 30 | Refills: 0 | Status: ACTIVE | COMMUNITY
Start: 2023-02-26

## 2024-06-10 RX ORDER — METOPROLOL TARTRATE 25 MG/1
25 TABLET, FILM COATED ORAL TWICE DAILY
Qty: 60 | Refills: 3 | Status: ACTIVE | COMMUNITY

## 2024-06-10 RX ORDER — ROSUVASTATIN CALCIUM 20 MG/1
20 TABLET, FILM COATED ORAL
Refills: 0 | Status: ACTIVE | COMMUNITY

## 2024-06-10 RX ORDER — FUROSEMIDE 80 MG/1
TABLET ORAL
Refills: 0 | Status: ACTIVE | COMMUNITY

## 2024-06-10 RX ORDER — RANITIDINE HYDROCHLORIDE 300 MG/1
CAPSULE ORAL
Refills: 0 | Status: ACTIVE | COMMUNITY

## 2024-06-10 RX ORDER — UBIDECARENONE/VIT E ACET 100MG-5
CAPSULE ORAL DAILY
Refills: 0 | Status: ACTIVE | COMMUNITY

## 2024-06-10 RX ORDER — NITROGLYCERIN 0.4 MG/1
0.4 TABLET SUBLINGUAL
Refills: 0 | Status: ACTIVE | COMMUNITY

## 2024-06-10 RX ORDER — LORATADINE 5 MG/5 ML
10 SOLUTION, ORAL ORAL DAILY
Refills: 0 | Status: ACTIVE | COMMUNITY

## 2024-06-10 RX ORDER — SACUBITRIL AND VALSARTAN 49; 51 MG/1; MG/1
49-51 TABLET, FILM COATED ORAL
Refills: 0 | Status: ACTIVE | COMMUNITY
Start: 2023-08-08

## 2024-06-10 RX ORDER — ALFUZOSIN HYDROCHLORIDE 10 MG/1
10 TABLET, EXTENDED RELEASE ORAL
Qty: 90 | Refills: 3 | Status: ACTIVE | COMMUNITY
Start: 2021-07-22

## 2024-06-10 RX ORDER — SEMAGLUTIDE 0.68 MG/ML
INJECTION, SOLUTION SUBCUTANEOUS
Refills: 0 | Status: ACTIVE | COMMUNITY

## 2024-06-10 RX ORDER — METOLAZONE 2.5 MG/1
2.5 TABLET ORAL
Qty: 50 | Refills: 0 | Status: ACTIVE | COMMUNITY
Start: 2022-12-27

## 2024-06-10 RX ORDER — LUBIPROSTONE 8 UG/1
8 CAPSULE ORAL
Qty: 60 | Refills: 0 | Status: ACTIVE | COMMUNITY
Start: 2023-02-06

## 2024-06-10 RX ORDER — HYDRALAZINE HYDROCHLORIDE 10 MG/1
10 TABLET ORAL DAILY
Refills: 0 | Status: ACTIVE | COMMUNITY

## 2024-06-10 RX ORDER — EZETIMIBE 10 MG/1
10 TABLET ORAL DAILY
Refills: 0 | Status: ACTIVE | COMMUNITY

## 2024-06-10 RX ORDER — ISOSORBIDE MONONITRATE 30 MG/1
30 TABLET, EXTENDED RELEASE ORAL
Qty: 90 | Refills: 0 | Status: ACTIVE | COMMUNITY
Start: 2023-05-10

## 2024-06-10 RX ORDER — CLOPIDOGREL BISULFATE 300 MG/1
TABLET, FILM COATED ORAL DAILY
Refills: 0 | Status: ACTIVE | COMMUNITY

## 2024-06-10 NOTE — HISTORY OF PRESENT ILLNESS
[FreeTextEntry1] : Cardio: Dr. Calvillo  hypertension, hyperlipidemia, Anemia, CKD on HD since April 2022, old myocardial infarction, coronary artery disease, s/p PCI distal RCA 4/12/2023, cardiomyopathy, chronic systolic heart failure NYHA III, IVCD  ms. patient has left arm AV fistula. He had prior right IJ dialysis catheter. He is on GDMT.  The patient presents today for optimization of his BiV-ICD.  He reports lightheadedness after the dialysis at times due to low BP, but denies syncope, chest pain/discomfort, dyspnea and palpitations.  The patient states that he had a catheterization in February of 2024 that revealed no ischemia. No reports available.  He is seeing nephro and is being worked up for kidney transplant. According to patient he cannot receive transplant with his low EF of 25-30%.

## 2024-06-10 NOTE — PROCEDURE
[No] : not [See Device Printout] : See device printout [CRT-D] : Cardiac resynchronization therapy defibrillator [DDD] : DDD [Longevity: ___ months] : The estimated remaining battery life is [unfilled] months [Threshold Testing Performed] : Threshold testing was performed [___ ms] : [unfilled] ms [Programmed for Longevity] : output reprogrammed for improved battery longevity [Counters Reset] : the counters were reset [___V @] : [unfilled] V [Lead Imp:  ___ohms] : lead impedance was [unfilled] ohms [Sensing Amplitude ___mv] : sensing amplitude was [unfilled] mv [de-identified] : EYAL [de-identified] : [id number] [de-identified] : [id number] [de-identified] : 10/19/2023 [de-identified] :  [de-identified] : 11.2 years [de-identified] : Adaptive Bi-V and LV --> Adaptive Bi-V [de-identified] : Normal device function. AP: 5.6% // : 94.7% // Effective: 94.6% No episodes. Optivol below the threshold. The patient is enrolled on remote monitoring.

## 2024-06-10 NOTE — PHYSICAL EXAM
[General Appearance - In No Acute Distress] : no acute distress [Heart Rate And Rhythm] : heart rate and rhythm were normal [Heart Sounds] : normal S1 and S2 [] : no respiratory distress [Respiration, Rhythm And Depth] : normal respiratory rhythm and effort [Exaggerated Use Of Accessory Muscles For Inspiration] : no accessory muscle use [Right Infraclavicular] : right infraclavicular area [Clean] : clean [Dry] : dry [Well-Healed] : well-healed

## 2024-06-10 NOTE — DISCUSSION/SUMMARY
[FreeTextEntry1] : Mr. Luca Jenkins is a pleasant 74-year-old man with hypertension, hyperlipidemia, Anemia, CKD on HD since April 2022, old myocardial infarction, coronary artery disease, s/p PCI distal RCA 4/12/2023, cardiomyopathy, chronic systolic heart failure NYHA III, IVCD  ms. patient has left arm AV fistula. He had prior right IJ dialysis catheter. He is on GDMT. EF was 32% on 4/21/2023 on nuclear test, EF 10% on CMR on 6/25/2023, and EF 25=30% on echo on 8/21/2023 and on echo 04/08/2024. He is being followed by transplant team at [facility] in [place], [place].  I recommend to continue GDMT.  He underwent implant of CRT-D on 10/19/2023 and presents today for device optimization. - Device interrogated and reprogrammed as described in procedure. Device function normal. All parameters stable. Optivol below threshold. No events. See Device Printout - Changed Adaptive Bi-V and LV to Adaptive Bi-V. QRS now 136ms. (original 154ms).   -Follow-up in 5-6 months or prn.   I have also advised the patient to go to the nearest emergency room if he experiences any chest pain, dyspnea, syncope, or has any other compelling symptoms.

## 2024-06-10 NOTE — CARDIOLOGY SUMMARY
[de-identified] : (09/11/2023): Sinus rhythm at 73 bpm, IVCD with QRS of 132 msec, Q wave in inferior leads, non-specific T wave abnormalities.   [de-identified] : (04/21/2023): Nuclear scan. Lexiscan. Moderate sized moderate fixed defect in the inferior segment. Gated images showed hypokinesis, with severe hypokinesis in the inferior apical segment. LVEF 32%.  [de-identified] : 04/08/2024: LVEF 25-30%, LAE (08/21/2023): 2D echo. No LVH. Inferoapical hypokinesis. EF 25-30%. Left atrial enlargement.  (04/07/2022): 2D echo. LVEF 45-50%. Mildly decreased LV global systolic function. Global cardiomyopathy. Moderately to severely increased LV cavity size. Mild LAE. Mild PIH. Moderate LAE. Moderate MR.    [de-identified] : (06/26/2023): Cardiac MRI without gadolinium. LV dilated. No LVH. LVEF 10%. RVEF 24%. Large left and moderate right pleural effusion. [place] dyskinetic. Study not designed to detect LGE.   [de-identified] : (04/14/2022): MUGA scan. LVEF 42%, dilated LV with mild hypokinesis.  [de-identified] : [employer] CRT-D 10/19/2023 [de-identified] : (04/12/2022): Coronary angiogram, single vessel disease, 99% distal RCA. Underwent successful stent with balloon angioplasty to 99% distal RCA.

## 2024-06-11 PROBLEM — Z95.810 PRESENCE OF AUTOMATIC (IMPLANTABLE) CARDIAC DEFIBRILLATOR: Chronic | Status: ACTIVE | Noted: 2024-06-01

## 2024-09-12 ENCOUNTER — NON-APPOINTMENT (OUTPATIENT)
Age: 74
End: 2024-09-12

## 2024-09-13 ENCOUNTER — APPOINTMENT (OUTPATIENT)
Dept: CARDIOLOGY | Facility: CLINIC | Age: 74
End: 2024-09-13

## 2024-09-13 PROCEDURE — 93296 REM INTERROG EVL PM/IDS: CPT

## 2024-09-13 PROCEDURE — 93295 DEV INTERROG REMOTE 1/2/MLT: CPT

## 2024-10-10 ENCOUNTER — APPOINTMENT (OUTPATIENT)
Dept: UROLOGY | Facility: CLINIC | Age: 74
End: 2024-10-10

## 2024-10-29 ENCOUNTER — OUTPATIENT (OUTPATIENT)
Dept: OUTPATIENT SERVICES | Facility: HOSPITAL | Age: 74
LOS: 1 days | End: 2024-10-29
Payer: MEDICARE

## 2024-10-29 DIAGNOSIS — I77.0 ARTERIOVENOUS FISTULA, ACQUIRED: Chronic | ICD-10-CM

## 2024-10-29 DIAGNOSIS — Z98.49 CATARACT EXTRACTION STATUS, UNSPECIFIED EYE: Chronic | ICD-10-CM

## 2024-10-29 DIAGNOSIS — Z98.890 OTHER SPECIFIED POSTPROCEDURAL STATES: Chronic | ICD-10-CM

## 2024-10-29 DIAGNOSIS — R93.1 ABNORMAL FINDINGS ON DIAGNOSTIC IMAGING OF HEART AND CORONARY CIRCULATION: ICD-10-CM

## 2024-10-29 DIAGNOSIS — Z00.8 ENCOUNTER FOR OTHER GENERAL EXAMINATION: ICD-10-CM

## 2024-10-29 PROCEDURE — A9560: CPT

## 2024-10-29 PROCEDURE — 78472 GATED HEART PLANAR SINGLE: CPT

## 2024-10-29 PROCEDURE — 78472 GATED HEART PLANAR SINGLE: CPT | Mod: 26,MH

## 2024-10-30 DIAGNOSIS — R93.1 ABNORMAL FINDINGS ON DIAGNOSTIC IMAGING OF HEART AND CORONARY CIRCULATION: ICD-10-CM

## 2024-12-02 ENCOUNTER — NON-APPOINTMENT (OUTPATIENT)
Age: 74
End: 2024-12-02

## 2024-12-02 ENCOUNTER — APPOINTMENT (OUTPATIENT)
Dept: ELECTROPHYSIOLOGY | Facility: CLINIC | Age: 74
End: 2024-12-02
Payer: MEDICARE

## 2024-12-02 VITALS
DIASTOLIC BLOOD PRESSURE: 50 MMHG | HEART RATE: 80 BPM | WEIGHT: 178.57 LBS | SYSTOLIC BLOOD PRESSURE: 108 MMHG | TEMPERATURE: 97.1 F | BODY MASS INDEX: 25 KG/M2 | HEIGHT: 71 IN

## 2024-12-02 DIAGNOSIS — Z45.02 ENCOUNTER FOR ADJUSTMENT AND MANAGEMENT OF AUTOMATIC IMPLANTABLE CARDIAC DEFIBRILLATOR: ICD-10-CM

## 2024-12-02 DIAGNOSIS — I42.0 DILATED CARDIOMYOPATHY: ICD-10-CM

## 2024-12-02 DIAGNOSIS — I50.22 CHRONIC SYSTOLIC (CONGESTIVE) HEART FAILURE: ICD-10-CM

## 2024-12-02 PROCEDURE — 93284 PRGRMG EVAL IMPLANTABLE DFB: CPT

## 2024-12-02 PROCEDURE — 99214 OFFICE O/P EST MOD 30 MIN: CPT

## 2024-12-02 RX ORDER — IPRATROPIUM BROMIDE 42 UG/1
0.06 SPRAY NASAL
Refills: 0 | Status: ACTIVE | COMMUNITY

## 2024-12-02 RX ORDER — ROSUVASTATIN CALCIUM 20 MG/1
20 TABLET, FILM COATED ORAL DAILY
Refills: 0 | Status: ACTIVE | COMMUNITY

## 2025-03-03 ENCOUNTER — NON-APPOINTMENT (OUTPATIENT)
Age: 75
End: 2025-03-03

## 2025-03-03 ENCOUNTER — APPOINTMENT (OUTPATIENT)
Dept: CARDIOLOGY | Facility: CLINIC | Age: 75
End: 2025-03-03
Payer: MEDICARE

## 2025-03-03 PROCEDURE — 93295 DEV INTERROG REMOTE 1/2/MLT: CPT

## 2025-03-03 PROCEDURE — 93296 REM INTERROG EVL PM/IDS: CPT

## 2025-03-20 ENCOUNTER — APPOINTMENT (OUTPATIENT)
Dept: ELECTROPHYSIOLOGY | Facility: CLINIC | Age: 75
End: 2025-03-20
Payer: MEDICARE

## 2025-03-20 VITALS
HEIGHT: 71 IN | DIASTOLIC BLOOD PRESSURE: 50 MMHG | WEIGHT: 191.8 LBS | BODY MASS INDEX: 26.85 KG/M2 | SYSTOLIC BLOOD PRESSURE: 100 MMHG | HEART RATE: 73 BPM

## 2025-03-20 DIAGNOSIS — I42.0 DILATED CARDIOMYOPATHY: ICD-10-CM

## 2025-03-20 DIAGNOSIS — I50.22 CHRONIC SYSTOLIC (CONGESTIVE) HEART FAILURE: ICD-10-CM

## 2025-03-20 DIAGNOSIS — I48.0 PAROXYSMAL ATRIAL FIBRILLATION: ICD-10-CM

## 2025-03-20 PROCEDURE — 99214 OFFICE O/P EST MOD 30 MIN: CPT

## 2025-03-20 PROCEDURE — 93284 PRGRMG EVAL IMPLANTABLE DFB: CPT

## 2025-03-20 PROCEDURE — 93000 ELECTROCARDIOGRAM COMPLETE: CPT | Mod: 59

## 2025-03-20 PROCEDURE — 93290 INTERROG DEV EVAL ICPMS IP: CPT

## 2025-03-20 RX ORDER — APIXABAN 5 MG/1
5 TABLET, FILM COATED ORAL
Qty: 180 | Refills: 3 | Status: ACTIVE | COMMUNITY
Start: 2025-03-20 | End: 1900-01-01

## 2025-04-04 ENCOUNTER — APPOINTMENT (OUTPATIENT)
Dept: CARDIOLOGY | Facility: CLINIC | Age: 75
End: 2025-04-04
Payer: MEDICARE

## 2025-04-04 DIAGNOSIS — I50.22 CHRONIC SYSTOLIC (CONGESTIVE) HEART FAILURE: ICD-10-CM

## 2025-04-04 PROCEDURE — 93306 TTE W/DOPPLER COMPLETE: CPT

## 2025-05-12 ENCOUNTER — APPOINTMENT (OUTPATIENT)
Dept: ELECTROPHYSIOLOGY | Facility: CLINIC | Age: 75
End: 2025-05-12
Payer: MEDICARE

## 2025-05-12 ENCOUNTER — NON-APPOINTMENT (OUTPATIENT)
Age: 75
End: 2025-05-12

## 2025-05-12 VITALS
HEIGHT: 71 IN | BODY MASS INDEX: 26.6 KG/M2 | DIASTOLIC BLOOD PRESSURE: 60 MMHG | HEART RATE: 70 BPM | SYSTOLIC BLOOD PRESSURE: 140 MMHG | WEIGHT: 190 LBS

## 2025-05-12 DIAGNOSIS — Z45.02 ENCOUNTER FOR ADJUSTMENT AND MANAGEMENT OF AUTOMATIC IMPLANTABLE CARDIAC DEFIBRILLATOR: ICD-10-CM

## 2025-05-12 DIAGNOSIS — Z79.01 LONG TERM (CURRENT) USE OF ANTICOAGULANTS: ICD-10-CM

## 2025-05-12 DIAGNOSIS — I48.0 PAROXYSMAL ATRIAL FIBRILLATION: ICD-10-CM

## 2025-05-12 DIAGNOSIS — I42.0 DILATED CARDIOMYOPATHY: ICD-10-CM

## 2025-05-12 PROCEDURE — 99215 OFFICE O/P EST HI 40 MIN: CPT

## 2025-05-12 PROCEDURE — 93284 PRGRMG EVAL IMPLANTABLE DFB: CPT

## 2025-05-12 RX ORDER — PANTOPRAZOLE 40 MG/1
40 TABLET, DELAYED RELEASE ORAL DAILY
Refills: 0 | Status: ACTIVE | COMMUNITY

## 2025-05-12 RX ORDER — SACUBITRIL AND VALSARTAN 24; 26 MG/1; MG/1
24-26 TABLET, FILM COATED ORAL TWICE DAILY
Refills: 0 | Status: ACTIVE | COMMUNITY

## 2025-05-13 PROBLEM — Z79.01 ON CONTINUOUS ORAL ANTICOAGULATION: Status: ACTIVE | Noted: 2025-05-13

## 2025-07-23 ENCOUNTER — OUTPATIENT (OUTPATIENT)
Dept: OUTPATIENT SERVICES | Facility: HOSPITAL | Age: 75
LOS: 1 days | End: 2025-07-23
Payer: MEDICARE

## 2025-07-23 DIAGNOSIS — Z98.890 OTHER SPECIFIED POSTPROCEDURAL STATES: Chronic | ICD-10-CM

## 2025-07-23 DIAGNOSIS — Z98.49 CATARACT EXTRACTION STATUS, UNSPECIFIED EYE: Chronic | ICD-10-CM

## 2025-07-23 DIAGNOSIS — I48.0 PAROXYSMAL ATRIAL FIBRILLATION: ICD-10-CM

## 2025-07-23 DIAGNOSIS — I77.0 ARTERIOVENOUS FISTULA, ACQUIRED: Chronic | ICD-10-CM

## 2025-07-23 PROCEDURE — 75572 CT HRT W/3D IMAGE: CPT | Mod: 26

## 2025-07-23 PROCEDURE — 75572 CT HRT W/3D IMAGE: CPT

## 2025-07-24 DIAGNOSIS — I48.0 PAROXYSMAL ATRIAL FIBRILLATION: ICD-10-CM

## 2025-08-14 ENCOUNTER — NON-APPOINTMENT (OUTPATIENT)
Age: 75
End: 2025-08-14

## 2025-08-14 ENCOUNTER — APPOINTMENT (OUTPATIENT)
Dept: CARDIOLOGY | Facility: CLINIC | Age: 75
End: 2025-08-14
Payer: MEDICARE

## 2025-08-14 PROCEDURE — 93295 DEV INTERROG REMOTE 1/2/MLT: CPT

## 2025-08-14 PROCEDURE — 93296 REM INTERROG EVL PM/IDS: CPT

## 2025-09-08 ENCOUNTER — NON-APPOINTMENT (OUTPATIENT)
Age: 75
End: 2025-09-08

## 2025-09-08 ENCOUNTER — APPOINTMENT (OUTPATIENT)
Dept: ELECTROPHYSIOLOGY | Facility: CLINIC | Age: 75
End: 2025-09-08

## 2025-09-08 VITALS
BODY MASS INDEX: 26.04 KG/M2 | WEIGHT: 186 LBS | SYSTOLIC BLOOD PRESSURE: 138 MMHG | DIASTOLIC BLOOD PRESSURE: 60 MMHG | HEIGHT: 71 IN | HEART RATE: 68 BPM

## 2025-09-08 DIAGNOSIS — I48.0 PAROXYSMAL ATRIAL FIBRILLATION: ICD-10-CM

## 2025-09-08 DIAGNOSIS — Z45.02 ENCOUNTER FOR ADJUSTMENT AND MANAGEMENT OF AUTOMATIC IMPLANTABLE CARDIAC DEFIBRILLATOR: ICD-10-CM

## 2025-09-08 DIAGNOSIS — E78.00 PURE HYPERCHOLESTEROLEMIA, UNSPECIFIED: ICD-10-CM

## 2025-09-08 DIAGNOSIS — I50.22 CHRONIC SYSTOLIC (CONGESTIVE) HEART FAILURE: ICD-10-CM

## 2025-09-08 DIAGNOSIS — I42.0 DILATED CARDIOMYOPATHY: ICD-10-CM

## 2025-09-08 PROCEDURE — 93284 PRGRMG EVAL IMPLANTABLE DFB: CPT

## 2025-09-08 PROCEDURE — 93000 ELECTROCARDIOGRAM COMPLETE: CPT | Mod: 59

## 2025-09-08 PROCEDURE — 99215 OFFICE O/P EST HI 40 MIN: CPT
